# Patient Record
Sex: FEMALE | Race: BLACK OR AFRICAN AMERICAN | Employment: UNEMPLOYED | URBAN - METROPOLITAN AREA
[De-identification: names, ages, dates, MRNs, and addresses within clinical notes are randomized per-mention and may not be internally consistent; named-entity substitution may affect disease eponyms.]

---

## 2017-08-23 ENCOUNTER — HOSPITAL ENCOUNTER (EMERGENCY)
Age: 37
Discharge: HOME | End: 2017-08-24
Attending: EMERGENCY MEDICINE
Payer: MEDICARE

## 2017-08-23 DIAGNOSIS — S01.81XA FACIAL LACERATION, INITIAL ENCOUNTER: Primary | ICD-10-CM

## 2017-08-23 PROCEDURE — 99284 EMERGENCY DEPT VISIT MOD MDM: CPT

## 2017-08-23 PROCEDURE — 12013 RPR F/E/E/N/L/M 2.6-5.0 CM: CPT

## 2017-08-24 ENCOUNTER — APPOINTMENT (OUTPATIENT)
Dept: CT IMAGING | Age: 37
End: 2017-08-24
Payer: MEDICARE

## 2017-08-24 VITALS
WEIGHT: 145 LBS | HEART RATE: 129 BPM | HEIGHT: 61 IN | SYSTOLIC BLOOD PRESSURE: 127 MMHG | TEMPERATURE: 97.7 F | BODY MASS INDEX: 27.38 KG/M2 | OXYGEN SATURATION: 96 % | DIASTOLIC BLOOD PRESSURE: 89 MMHG | RESPIRATION RATE: 21 BRPM

## 2017-08-24 PROCEDURE — 2580000003 HC RX 258: Performed by: EMERGENCY MEDICINE

## 2017-08-24 PROCEDURE — 6360000002 HC RX W HCPCS: Performed by: EMERGENCY MEDICINE

## 2017-08-24 PROCEDURE — 70450 CT HEAD/BRAIN W/O DYE: CPT

## 2017-08-24 PROCEDURE — 96374 THER/PROPH/DIAG INJ IV PUSH: CPT

## 2017-08-24 PROCEDURE — 6370000000 HC RX 637 (ALT 250 FOR IP): Performed by: EMERGENCY MEDICINE

## 2017-08-24 RX ORDER — IBUPROFEN 800 MG/1
800 TABLET ORAL EVERY 8 HOURS PRN
Qty: 30 TABLET | Refills: 0 | Status: SHIPPED | OUTPATIENT
Start: 2017-08-24 | End: 2020-11-24

## 2017-08-24 RX ORDER — ONDANSETRON 2 MG/ML
4 INJECTION INTRAMUSCULAR; INTRAVENOUS ONCE
Status: COMPLETED | OUTPATIENT
Start: 2017-08-24 | End: 2017-08-24

## 2017-08-24 RX ORDER — LIDOCAINE HYDROCHLORIDE AND EPINEPHRINE 10; 10 MG/ML; UG/ML
20 INJECTION, SOLUTION INFILTRATION; PERINEURAL ONCE
Status: DISCONTINUED | OUTPATIENT
Start: 2017-08-24 | End: 2017-08-24 | Stop reason: HOSPADM

## 2017-08-24 RX ORDER — 0.9 % SODIUM CHLORIDE 0.9 %
1000 INTRAVENOUS SOLUTION INTRAVENOUS ONCE
Status: COMPLETED | OUTPATIENT
Start: 2017-08-24 | End: 2017-08-24

## 2017-08-24 RX ADMIN — Medication: at 01:21

## 2017-08-24 RX ADMIN — ONDANSETRON 4 MG: 2 INJECTION, SOLUTION INTRAMUSCULAR; INTRAVENOUS at 00:12

## 2017-08-24 RX ADMIN — SODIUM CHLORIDE 1000 ML: 9 INJECTION, SOLUTION INTRAVENOUS at 00:11

## 2017-08-24 ASSESSMENT — PAIN DESCRIPTION - FREQUENCY: FREQUENCY: CONTINUOUS

## 2017-08-24 ASSESSMENT — ENCOUNTER SYMPTOMS
COLOR CHANGE: 0
NAUSEA: 1
DIARRHEA: 0
CONSTIPATION: 0
BACK PAIN: 0
PHOTOPHOBIA: 0
ABDOMINAL PAIN: 0
SHORTNESS OF BREATH: 0
COUGH: 0
VOMITING: 1
SINUS PRESSURE: 0
RHINORRHEA: 0

## 2017-08-24 ASSESSMENT — PAIN SCALES - GENERAL: PAINLEVEL_OUTOF10: 10

## 2017-08-24 ASSESSMENT — PAIN DESCRIPTION - ONSET: ONSET: SUDDEN

## 2017-08-24 ASSESSMENT — PAIN DESCRIPTION - DESCRIPTORS: DESCRIPTORS: ACHING;SORE

## 2017-08-24 ASSESSMENT — PAIN DESCRIPTION - ORIENTATION: ORIENTATION: LEFT

## 2017-08-24 ASSESSMENT — PAIN DESCRIPTION - LOCATION: LOCATION: FACE

## 2017-08-24 ASSESSMENT — PAIN DESCRIPTION - PAIN TYPE: TYPE: ACUTE PAIN

## 2017-09-07 ENCOUNTER — HOSPITAL ENCOUNTER (EMERGENCY)
Age: 37
Discharge: HOME | End: 2017-09-07
Attending: EMERGENCY MEDICINE
Payer: MEDICARE

## 2017-09-07 VITALS
HEART RATE: 85 BPM | DIASTOLIC BLOOD PRESSURE: 100 MMHG | TEMPERATURE: 98.4 F | RESPIRATION RATE: 18 BRPM | OXYGEN SATURATION: 100 % | SYSTOLIC BLOOD PRESSURE: 148 MMHG

## 2017-09-07 DIAGNOSIS — Z48.02 ENCOUNTER FOR REMOVAL OF SUTURES: Primary | ICD-10-CM

## 2017-09-07 PROCEDURE — 99281 EMR DPT VST MAYX REQ PHY/QHP: CPT

## 2017-09-07 ASSESSMENT — ENCOUNTER SYMPTOMS
VOMITING: 0
NAUSEA: 0
SHORTNESS OF BREATH: 0

## 2018-01-01 ENCOUNTER — HOSPITAL ENCOUNTER (EMERGENCY)
Age: 38
Discharge: LEFT BEFORE DISCHARGE | End: 2018-01-01
Attending: EMERGENCY MEDICINE
Payer: MEDICARE

## 2018-01-01 VITALS
HEART RATE: 95 BPM | DIASTOLIC BLOOD PRESSURE: 104 MMHG | RESPIRATION RATE: 12 BRPM | SYSTOLIC BLOOD PRESSURE: 161 MMHG | BODY MASS INDEX: 30.23 KG/M2 | WEIGHT: 160 LBS | TEMPERATURE: 97.5 F

## 2018-01-01 DIAGNOSIS — Z53.20 LEFT BEFORE TREATMENT COMPLETED: Primary | ICD-10-CM

## 2018-01-01 PROCEDURE — 99283 EMERGENCY DEPT VISIT LOW MDM: CPT

## 2018-01-01 ASSESSMENT — PAIN SCALES - GENERAL: PAINLEVEL_OUTOF10: 9

## 2018-01-01 ASSESSMENT — PAIN DESCRIPTION - DESCRIPTORS: DESCRIPTORS: TINGLING

## 2018-01-01 ASSESSMENT — PAIN DESCRIPTION - ORIENTATION: ORIENTATION: LEFT;RIGHT

## 2018-01-01 ASSESSMENT — PAIN DESCRIPTION - LOCATION: LOCATION: LEG

## 2018-01-01 NOTE — ED PROVIDER NOTES
11:47 AM  Patient is not in the room, there is a used gown sitting on the bed. Both bathroom doors are open and not in use. Riya Ambrose PA-C  01/01/18 1148      11:59 AM  Patient is still not in the room.   We'll presume she has eloped     Jacobo Flores PA-C  01/01/18 1152

## 2018-01-01 NOTE — ED PROVIDER NOTES
[address] [facility]     Emergency Department     Faculty Attestation    I performed a history and physical examination of the patient and discussed management with the resident. I have reviewed and agree with the residents findings including all diagnostic interpretations, and treatment plans as written. Any areas of disagreement are noted on the chart. I was personally present for the key portions of any procedures. I have documented in the chart those procedures where I was not present during the key portions. I have reviewed the emergency nurses triage note. I agree with the chief complaint, past medical history, past surgical history, allergies, medications, social and family history as documented unless otherwise noted below. Documentation of the HPI, Physical Exam and Medical Decision Making performed by scribrolan is based on my personal performance of the HPI, PE and MDM. For Physician Assistant/ Nurse Practitioner cases/documentation I have personally evaluated this patient and have completed at least one if not all key elements of the E/M (history, physical exam, and MDM). Additional findings are as noted. Patient left without being seen      Pre-hypertension/Hypertension: The patient has been informed that they may have pre-hypertension or Hypertension based on a blood pressure reading in the emergency department. I recommend that the patient call the primary care provider listed on their discharge instructions or a physician of their choice this week to arrange follow up for further evaluation of possible pre-hypertension or Hypertension.         [address] [place], [place]  01/01/18 2634

## 2018-04-08 ENCOUNTER — HOSPITAL ENCOUNTER (EMERGENCY)
Age: 38
Discharge: HOME | End: 2018-04-09
Attending: EMERGENCY MEDICINE
Payer: MEDICARE

## 2018-04-08 VITALS
TEMPERATURE: 97.6 F | OXYGEN SATURATION: 100 % | DIASTOLIC BLOOD PRESSURE: 103 MMHG | RESPIRATION RATE: 20 BRPM | WEIGHT: 145 LBS | SYSTOLIC BLOOD PRESSURE: 135 MMHG | HEIGHT: 62 IN | BODY MASS INDEX: 26.68 KG/M2 | HEART RATE: 105 BPM

## 2018-04-08 DIAGNOSIS — M79.604 PAIN IN BOTH LOWER EXTREMITIES: Primary | ICD-10-CM

## 2018-04-08 DIAGNOSIS — M79.605 PAIN IN BOTH LOWER EXTREMITIES: Primary | ICD-10-CM

## 2018-04-08 LAB — D-DIMER QUANTITATIVE: 0.47 MG/L FEU

## 2018-04-08 PROCEDURE — 83874 ASSAY OF MYOGLOBIN: CPT

## 2018-04-08 PROCEDURE — 85025 COMPLETE CBC W/AUTO DIFF WBC: CPT

## 2018-04-08 PROCEDURE — G0480 DRUG TEST DEF 1-7 CLASSES: HCPCS

## 2018-04-08 PROCEDURE — 36415 COLL VENOUS BLD VENIPUNCTURE: CPT

## 2018-04-08 PROCEDURE — 99283 EMERGENCY DEPT VISIT LOW MDM: CPT

## 2018-04-08 PROCEDURE — 96374 THER/PROPH/DIAG INJ IV PUSH: CPT

## 2018-04-08 PROCEDURE — 2580000003 HC RX 258: Performed by: EMERGENCY MEDICINE

## 2018-04-08 PROCEDURE — 6360000002 HC RX W HCPCS: Performed by: EMERGENCY MEDICINE

## 2018-04-08 PROCEDURE — 83735 ASSAY OF MAGNESIUM: CPT

## 2018-04-08 PROCEDURE — 85379 FIBRIN DEGRADATION QUANT: CPT

## 2018-04-08 PROCEDURE — 80048 BASIC METABOLIC PNL TOTAL CA: CPT

## 2018-04-08 PROCEDURE — 82550 ASSAY OF CK (CPK): CPT

## 2018-04-08 RX ORDER — 0.9 % SODIUM CHLORIDE 0.9 %
1000 INTRAVENOUS SOLUTION INTRAVENOUS ONCE
Status: COMPLETED | OUTPATIENT
Start: 2018-04-08 | End: 2018-04-09

## 2018-04-08 RX ORDER — KETOROLAC TROMETHAMINE 30 MG/ML
30 INJECTION, SOLUTION INTRAMUSCULAR; INTRAVENOUS ONCE
Status: COMPLETED | OUTPATIENT
Start: 2018-04-08 | End: 2018-04-08

## 2018-04-08 RX ADMIN — SODIUM CHLORIDE 1000 ML: 9 INJECTION, SOLUTION INTRAVENOUS at 23:38

## 2018-04-08 RX ADMIN — KETOROLAC TROMETHAMINE 30 MG: 30 INJECTION, SOLUTION INTRAMUSCULAR at 23:38

## 2018-04-08 ASSESSMENT — ENCOUNTER SYMPTOMS
TROUBLE SWALLOWING: 0
BLOOD IN STOOL: 0
EYE DISCHARGE: 0
RHINORRHEA: 0
SHORTNESS OF BREATH: 0
SINUS PRESSURE: 0
ABDOMINAL PAIN: 0
CONSTIPATION: 0
DIARRHEA: 0
BACK PAIN: 0
NAUSEA: 0
WHEEZING: 0
VOMITING: 0
EYE REDNESS: 0
EYE PAIN: 0
COLOR CHANGE: 0
CHEST TIGHTNESS: 0
SORE THROAT: 0
COUGH: 0
FACIAL SWELLING: 0

## 2018-04-08 ASSESSMENT — PAIN SCALES - GENERAL
PAINLEVEL_OUTOF10: 10
PAINLEVEL_OUTOF10: 10

## 2018-04-09 LAB
ABSOLUTE EOS #: 0.26 K/UL (ref 0–0.4)
ABSOLUTE IMMATURE GRANULOCYTE: ABNORMAL K/UL (ref 0–0.3)
ABSOLUTE LYMPH #: 6.2 K/UL (ref 1–4.8)
ABSOLUTE MONO #: 0.4 K/UL (ref 0.1–1.3)
ANION GAP SERPL CALCULATED.3IONS-SCNC: 14 MMOL/L (ref 9–17)
BASOPHILS # BLD: 0 % (ref 0–2)
BASOPHILS ABSOLUTE: 0 K/UL (ref 0–0.2)
BUN BLDV-MCNC: 8 MG/DL (ref 6–20)
BUN/CREAT BLD: ABNORMAL (ref 9–20)
CALCIUM SERPL-MCNC: 8.6 MG/DL (ref 8.6–10.4)
CHLORIDE BLD-SCNC: 105 MMOL/L (ref 98–107)
CO2: 22 MMOL/L (ref 20–31)
CREAT SERPL-MCNC: 0.49 MG/DL (ref 0.5–0.9)
DIFFERENTIAL TYPE: ABNORMAL
EOSINOPHILS RELATIVE PERCENT: 2 % (ref 0–4)
ETHANOL PERCENT: 0.29 %
ETHANOL: 286 MG/DL
GFR AFRICAN AMERICAN: >60 ML/MIN
GFR NON-AFRICAN AMERICAN: >60 ML/MIN
GFR SERPL CREATININE-BSD FRML MDRD: ABNORMAL ML/MIN/{1.73_M2}
GFR SERPL CREATININE-BSD FRML MDRD: ABNORMAL ML/MIN/{1.73_M2}
GLUCOSE BLD-MCNC: 79 MG/DL (ref 70–99)
HCT VFR BLD CALC: 39.9 % (ref 36–46)
HEMOGLOBIN: 13.8 G/DL (ref 12–16)
IMMATURE GRANULOCYTES: ABNORMAL %
LYMPHOCYTES # BLD: 47 % (ref 24–44)
MAGNESIUM: 2.1 MG/DL (ref 1.6–2.6)
MCH RBC QN AUTO: 33.7 PG (ref 26–34)
MCHC RBC AUTO-ENTMCNC: 34.5 G/DL (ref 31–37)
MCV RBC AUTO: 97.8 FL (ref 80–100)
MONOCYTES # BLD: 3 % (ref 1–7)
MORPHOLOGY: ABNORMAL
MYOGLOBIN: <21 NG/ML (ref 25–58)
NRBC AUTOMATED: ABNORMAL PER 100 WBC
PDW BLD-RTO: 15.2 % (ref 11.5–14.9)
PLATELET # BLD: 304 K/UL (ref 150–450)
PLATELET ESTIMATE: ABNORMAL
PMV BLD AUTO: 8.4 FL (ref 6–12)
POTASSIUM SERPL-SCNC: 3.4 MMOL/L (ref 3.7–5.3)
RBC # BLD: 4.08 M/UL (ref 4–5.2)
RBC # BLD: ABNORMAL 10*6/UL
SEG NEUTROPHILS: 48 % (ref 36–66)
SEGMENTED NEUTROPHILS ABSOLUTE COUNT: 6.34 K/UL (ref 1.3–9.1)
SODIUM BLD-SCNC: 141 MMOL/L (ref 135–144)
TOTAL CK: 88 U/L (ref 26–192)
WBC # BLD: 13.2 K/UL (ref 3.5–11)
WBC # BLD: ABNORMAL 10*3/UL

## 2018-04-09 PROCEDURE — 6370000000 HC RX 637 (ALT 250 FOR IP): Performed by: EMERGENCY MEDICINE

## 2018-04-09 RX ORDER — CYCLOBENZAPRINE HCL 10 MG
10 TABLET ORAL ONCE
Status: COMPLETED | OUTPATIENT
Start: 2018-04-09 | End: 2018-04-09

## 2018-04-09 RX ORDER — TRAMADOL HYDROCHLORIDE 50 MG/1
50 TABLET ORAL ONCE
Status: COMPLETED | OUTPATIENT
Start: 2018-04-09 | End: 2018-04-09

## 2018-04-09 RX ORDER — TRAMADOL HYDROCHLORIDE 50 MG/1
50 TABLET ORAL EVERY 6 HOURS PRN
Qty: 10 TABLET | Refills: 0 | Status: SHIPPED | OUTPATIENT
Start: 2018-04-09 | End: 2018-04-19

## 2018-04-09 RX ORDER — CYCLOBENZAPRINE HCL 10 MG
10 TABLET ORAL 3 TIMES DAILY PRN
Qty: 20 TABLET | Refills: 0 | Status: SHIPPED | OUTPATIENT
Start: 2018-04-09 | End: 2018-04-19

## 2018-04-09 RX ADMIN — CYCLOBENZAPRINE HYDROCHLORIDE 10 MG: 10 TABLET, FILM COATED ORAL at 00:12

## 2018-04-09 RX ADMIN — TRAMADOL HYDROCHLORIDE 50 MG: 50 TABLET, FILM COATED ORAL at 00:12

## 2018-04-09 ASSESSMENT — PAIN SCALES - GENERAL: PAINLEVEL_OUTOF10: 10

## 2018-11-24 ENCOUNTER — HOSPITAL ENCOUNTER (EMERGENCY)
Age: 38
Discharge: HOME | End: 2018-11-25
Attending: EMERGENCY MEDICINE
Payer: MEDICARE

## 2018-11-24 VITALS
HEIGHT: 62 IN | SYSTOLIC BLOOD PRESSURE: 208 MMHG | DIASTOLIC BLOOD PRESSURE: 114 MMHG | TEMPERATURE: 97.9 F | OXYGEN SATURATION: 100 % | RESPIRATION RATE: 18 BRPM | WEIGHT: 145 LBS | HEART RATE: 98 BPM | BODY MASS INDEX: 26.68 KG/M2

## 2018-11-24 DIAGNOSIS — M79.604 BILATERAL LEG PAIN: Primary | ICD-10-CM

## 2018-11-24 DIAGNOSIS — M79.605 BILATERAL LEG PAIN: Primary | ICD-10-CM

## 2018-11-24 PROCEDURE — 6360000002 HC RX W HCPCS: Performed by: EMERGENCY MEDICINE

## 2018-11-24 PROCEDURE — 96372 THER/PROPH/DIAG INJ SC/IM: CPT

## 2018-11-24 PROCEDURE — 99283 EMERGENCY DEPT VISIT LOW MDM: CPT

## 2018-11-24 PROCEDURE — 6370000000 HC RX 637 (ALT 250 FOR IP): Performed by: EMERGENCY MEDICINE

## 2018-11-24 RX ORDER — KETOROLAC TROMETHAMINE 30 MG/ML
30 INJECTION, SOLUTION INTRAMUSCULAR; INTRAVENOUS ONCE
Status: COMPLETED | OUTPATIENT
Start: 2018-11-24 | End: 2018-11-24

## 2018-11-24 RX ORDER — HYDROCODONE BITARTRATE AND ACETAMINOPHEN 5; 325 MG/1; MG/1
1 TABLET ORAL EVERY 12 HOURS PRN
Qty: 5 TABLET | Refills: 0 | Status: SHIPPED | OUTPATIENT
Start: 2018-11-24 | End: 2018-11-26

## 2018-11-24 RX ORDER — DIAZEPAM 2 MG/1
2 TABLET ORAL ONCE
Status: COMPLETED | OUTPATIENT
Start: 2018-11-24 | End: 2018-11-24

## 2018-11-24 RX ADMIN — KETOROLAC TROMETHAMINE 30 MG: 30 INJECTION, SOLUTION INTRAMUSCULAR at 22:42

## 2018-11-24 RX ADMIN — DIAZEPAM 2 MG: 2 TABLET ORAL at 22:42

## 2018-11-24 ASSESSMENT — ENCOUNTER SYMPTOMS
DIARRHEA: 0
ABDOMINAL PAIN: 0
SORE THROAT: 0
SHORTNESS OF BREATH: 0
EYE PAIN: 0
VOMITING: 0
COUGH: 0
EYE DISCHARGE: 0
NAUSEA: 0

## 2018-11-24 ASSESSMENT — PAIN SCALES - GENERAL
PAINLEVEL_OUTOF10: 8
PAINLEVEL_OUTOF10: 8

## 2018-11-24 ASSESSMENT — PAIN DESCRIPTION - ORIENTATION: ORIENTATION: LEFT;RIGHT

## 2018-11-24 ASSESSMENT — PAIN DESCRIPTION - LOCATION: LOCATION: LEG

## 2018-11-24 ASSESSMENT — PAIN DESCRIPTION - DESCRIPTORS: DESCRIPTORS: SHARP;SHOOTING;DULL;ACHING

## 2018-11-25 NOTE — ED PROVIDER NOTES
[facility]  ED  Emergency Department Encounter  EmergencyMedicine Resident     Pt Name:Natalia Baker  MRN: [id number]  [place] 1980  Date of evaluation: 18  PCP:  No primary care provider on file. CHIEF COMPLAINT       Chief Complaint   Patient presents with    Leg Pain     bilateral leg pain for a few days, pt thinks she has nerve damage from previous DVTs, states cramping in legs. Pt is ambulatory with steady gait       HISTORY OF PRESENT ILLNESS  (Location/Symptom, Timing/Onset, Context/Setting, Quality, Duration, Modifying Factors, Severity.)      Pedro Guillermo is a 41 y.o. female who presents with bilateral leg pain ×2 years, worse in last few days. Patient states she has had this pain since her previous blood clots, which were treated with anticoagulation for approximately 8 months, blood clots attributed to pregnancy. Patient notes the pain starts in bilateral posterior thighs and radiates down her legs. Patient notes she was seen a few months ago for the same complaint, given pain medication and states that workup was negative at that time. Patient denies any numbness, tingling or weakness to her legs any unilateral leg swelling or any recent surgery or immobilization any history of cancer. PAST MEDICAL / SURGICAL / SOCIAL / FAMILY HISTORY      has a past medical history of H/O blood clots. has a past surgical history that includes  section (, ) and Tubal ligation (). Social History     Social History    Marital status: Single     Spouse name: N/A    Number of children: N/A    Years of education: N/A     Occupational History    Not on file.      Social History Main Topics    Smoking status: Current Every Day Smoker     Packs/day: 0.20     Types: Cigarettes    Smokeless tobacco: Never Used    Alcohol use 0.0 oz/week      Comment: socially     Drug use: No    Sexual activity: Yes     Partners: Male     Other Topics Concern    Not on file     Social History Narrative    No narrative on file       Family History   Problem Relation Age of Onset    High Blood Pressure Mother     High Blood Pressure Maternal Grandmother        Allergies:  Patient has no known allergies. Home Medications:  Prior to Admission medications    Medication Sig Start Date End Date Taking? Authorizing Provider   ibuprofen (ADVIL;MOTRIN) 800 MG tablet Take 1 tablet by mouth every 8 hours as needed for Pain 8/24/17   Tessy De MD   HYDROcodone-acetaminophen [facility]) 5-325 MG per tablet Take 1-2 tablets by mouth every 6 hours as needed for Pain 9/11/15   Isamar Peguero MD       REVIEW OF SYSTEMS    (2-9 systems for level 4, 10 or more for level 5)      Review of Systems   Constitutional: Negative for chills, diaphoresis and fever. HENT: Negative for congestion and sore throat. Eyes: Negative for pain and discharge. Respiratory: Negative for cough and shortness of breath. Cardiovascular: Negative for chest pain and leg swelling. Gastrointestinal: Negative for abdominal pain, diarrhea, nausea and vomiting. Endocrine: Negative for polydipsia and polyuria. Genitourinary: Negative for dysuria and hematuria. Musculoskeletal: Negative for neck pain and neck stiffness. Skin: Negative for pallor and rash. Allergic/Immunologic: Negative for environmental allergies and food allergies. Neurological: Negative for numbness and headaches. Hematological: Negative for adenopathy. Does not bruise/bleed easily. Psychiatric/Behavioral: Negative for hallucinations and suicidal ideas. PHYSICAL EXAM   (up to 7 for level 4, 8 or more for level 5)      INITIAL VITALS:   BP (!) 208/114   Pulse 98   Temp 97.9 °F (36.6 °C) (Oral)   Resp 18   Ht 5' 2\" (1.575 m)   Wt 145 lb (65.8 kg)   LMP 11/22/2018   SpO2 100%   BMI 26.52 kg/m²     Physical Exam   Constitutional: She is oriented to person, place, and time.  She appears well-developed and starts in bilateral thighs and radiates throughout both legs, history of DVT when she was pregnant and notes the pain is been present off-and-on thereafter. There is no concerning signs for DVT, no unilateral swelling, pain throughout both legs not consistent with DVT, compartments are soft, doubt compartment syndrome, no recent trauma either. Most likely muscle tightness radiated from lower back or muscle spasm, will provide Toradol and low-dose Valium for muscle relaxation and reassess. Reviewed recent visit to [facility], lab workup including CK, myoglobin, d-dimer and a left lites were all negative, she states that this pain is the same as during that visit. RADIOLOGY:  None    EKG  None    All EKG's are interpreted by the Emergency Department Physician who either signs or Co-signs this chart in the absence of a cardiologist.    EMERGENCY DEPARTMENT COURSE:  On reassessment, patient states pain has improved, reassessment of blood pressure is 645 systolic  Patient ambulating without significant difficulty, states the pain starts in her buttocks and goes down the back of both legs, discussed need for her to follow up with primary care provider as this could be sciatic nerve pain or potentially radiculopathy, patient understands and has an appointment within the next couple weeks with her primary care provider. I discussed stretching techniques with patient. I discussed discharge plan, follow-up plan and return precautions with patient including any numbness or weakness to the legs or bowel or bladder problems, patient understands and agrees with discharge plan    PROCEDURES:  None    CONSULTS:  None    CRITICAL CARE:  None    FINAL IMPRESSION      No diagnosis found. Bilateral leg pain    DISPOSITION / PLAN     DISPOSITION        PATIENT REFERRED TO:  No follow-up provider specified.     DISCHARGE MEDICATIONS:  New Prescriptions    No medications on file       Sunita Fowler DO  Emergency Medicine

## 2018-12-18 ENCOUNTER — HOSPITAL ENCOUNTER (EMERGENCY)
Age: 38
Discharge: HOME | End: 2018-12-18
Attending: EMERGENCY MEDICINE
Payer: MEDICARE

## 2018-12-18 VITALS
WEIGHT: 145 LBS | OXYGEN SATURATION: 100 % | HEART RATE: 104 BPM | SYSTOLIC BLOOD PRESSURE: 144 MMHG | BODY MASS INDEX: 26.52 KG/M2 | RESPIRATION RATE: 16 BRPM | TEMPERATURE: 97.8 F | DIASTOLIC BLOOD PRESSURE: 98 MMHG

## 2018-12-18 DIAGNOSIS — L02.91 ABSCESS: Primary | ICD-10-CM

## 2018-12-18 PROCEDURE — 99282 EMERGENCY DEPT VISIT SF MDM: CPT

## 2018-12-18 RX ORDER — SULFAMETHOXAZOLE AND TRIMETHOPRIM 800; 160 MG/1; MG/1
1 TABLET ORAL 2 TIMES DAILY
Qty: 14 TABLET | Refills: 0 | Status: SHIPPED | OUTPATIENT
Start: 2018-12-18 | End: 2018-12-25

## 2018-12-18 RX ORDER — CEPHALEXIN 500 MG/1
500 CAPSULE ORAL 2 TIMES DAILY
Qty: 14 CAPSULE | Refills: 0 | Status: SHIPPED | OUTPATIENT
Start: 2018-12-18 | End: 2018-12-25

## 2018-12-18 RX ORDER — ACETAMINOPHEN 500 MG
500 TABLET ORAL 3 TIMES DAILY PRN
Qty: 21 TABLET | Refills: 0 | Status: ON HOLD | OUTPATIENT
Start: 2018-12-18 | End: 2021-04-17

## 2018-12-18 ASSESSMENT — PAIN DESCRIPTION - PROGRESSION: CLINICAL_PROGRESSION: GRADUALLY WORSENING

## 2018-12-18 ASSESSMENT — PAIN SCALES - GENERAL: PAINLEVEL_OUTOF10: 8

## 2018-12-18 ASSESSMENT — PAIN DESCRIPTION - PAIN TYPE: TYPE: ACUTE PAIN

## 2018-12-18 ASSESSMENT — PAIN DESCRIPTION - ORIENTATION: ORIENTATION: RIGHT

## 2018-12-18 ASSESSMENT — PAIN DESCRIPTION - ONSET: ONSET: GRADUAL

## 2018-12-18 ASSESSMENT — PAIN DESCRIPTION - DESCRIPTORS: DESCRIPTORS: SHARP;ACHING

## 2018-12-18 ASSESSMENT — PAIN DESCRIPTION - FREQUENCY: FREQUENCY: CONTINUOUS

## 2018-12-19 ASSESSMENT — ENCOUNTER SYMPTOMS
ABDOMINAL PAIN: 0
BACK PAIN: 0
CHOKING: 0
APNEA: 0
EYE DISCHARGE: 0
CHEST TIGHTNESS: 0
ABDOMINAL DISTENTION: 0

## 2018-12-19 NOTE — ED NOTES
Pt states that she needs a note to excuse her from the shelter where she is staying, Dr. Jesica Flores notified     Marlen Lira RN  12/18/18 1946

## 2018-12-19 NOTE — ED PROVIDER NOTES
[facility] ED  eMERGENCY dEPARTMENT eNCOUnter      Pt Name: Parth Alas  MRN: [id number]  [place] 1980  Date of evaluation: 12/18/2018  Provider: Rachel Akers MD    [address] [place] [address]       Chief Complaint   Patient presents with    Abscess     Right axilla abscess, present x 1 week, discharge and foul drainage today. Pt reports pain. HISTORY OF PRESENT ILLNESS   (Location/Symptom, Timing/Onset, Context/Setting, Quality, Duration, Modifying Factors, Severity)  Note limiting factors. Parth Alas is a 41 y.o. female who presents to the emergency department For complaints of abscess and right axillary area. Patient reports 1 week ago she noticed a small bump there when shaving. She denied any trauma to it at that time. This a.m. patient reported she had noticed the indurated area increase in size and pain. Throughout the day she felt the pain increasing and began to notice white foul-smelling discharge coming from the area which is what prompted the ED visit. Patient reports changing soap but denies noticing any inciting event. Patient denies fevers, chills, or other systemic symptoms. Patient reports previous history many years ago of abscess which required surgical drainage. HPI    Nursing Notes were reviewed. REVIEW OF SYSTEMS    (2-9 systems for level 4, 10 or more for level 5)     Review of Systems   Constitutional: Negative for activity change and appetite change. HENT: Negative for congestion and dental problem. Eyes: Negative for discharge. Respiratory: Negative for apnea, choking and chest tightness. Cardiovascular: Negative for chest pain, palpitations and leg swelling. Gastrointestinal: Negative for abdominal distention and abdominal pain. Genitourinary: Negative for difficulty urinating, dyspareunia and dysuria. Musculoskeletal: Negative for arthralgias and back pain. Neurological: Negative for dizziness and headaches.    Hematological:

## 2019-06-22 ENCOUNTER — HOSPITAL ENCOUNTER (INPATIENT)
Age: 39
LOS: 1 days | Discharge: HOME | DRG: 197 | End: 2019-06-23
Attending: EMERGENCY MEDICINE | Admitting: INTERNAL MEDICINE
Payer: MEDICARE

## 2019-06-22 DIAGNOSIS — I82.401 DEEP VEIN THROMBOSIS (DVT) OF RIGHT LOWER EXTREMITY, UNSPECIFIED CHRONICITY, UNSPECIFIED VEIN (HCC): Primary | ICD-10-CM

## 2019-06-22 PROBLEM — M79.89 RIGHT LEG SWELLING: Status: ACTIVE | Noted: 2019-06-22

## 2019-06-22 PROBLEM — I82.413 ACUTE DEEP VEIN THROMBOSIS (DVT) OF FEMORAL VEIN OF BOTH LOWER EXTREMITIES (HCC): Status: ACTIVE | Noted: 2019-06-22

## 2019-06-22 LAB
ABSOLUTE EOS #: 0.16 K/UL (ref 0–0.44)
ABSOLUTE IMMATURE GRANULOCYTE: 0.05 K/UL (ref 0–0.3)
ABSOLUTE LYMPH #: 4.28 K/UL (ref 1.1–3.7)
ABSOLUTE MONO #: 0.36 K/UL (ref 0.1–1.2)
ANION GAP SERPL CALCULATED.3IONS-SCNC: 15 MMOL/L (ref 9–17)
BASOPHILS # BLD: 0 % (ref 0–2)
BASOPHILS ABSOLUTE: 0.05 K/UL (ref 0–0.2)
BUN BLDV-MCNC: 14 MG/DL (ref 6–20)
BUN/CREAT BLD: NORMAL (ref 9–20)
CALCIUM SERPL-MCNC: 9.3 MG/DL (ref 8.6–10.4)
CHLORIDE BLD-SCNC: 103 MMOL/L (ref 98–107)
CO2: 21 MMOL/L (ref 20–31)
CREAT SERPL-MCNC: 0.58 MG/DL (ref 0.5–0.9)
DIFFERENTIAL TYPE: ABNORMAL
EOSINOPHILS RELATIVE PERCENT: 1 % (ref 1–4)
GFR AFRICAN AMERICAN: >60 ML/MIN
GFR NON-AFRICAN AMERICAN: >60 ML/MIN
GFR SERPL CREATININE-BSD FRML MDRD: NORMAL ML/MIN/{1.73_M2}
GFR SERPL CREATININE-BSD FRML MDRD: NORMAL ML/MIN/{1.73_M2}
GLUCOSE BLD-MCNC: 92 MG/DL (ref 70–99)
HCG QUALITATIVE: NEGATIVE
HCT VFR BLD CALC: 38.9 % (ref 36.3–47.1)
HEMOGLOBIN: 13.3 G/DL (ref 11.9–15.1)
IMMATURE GRANULOCYTES: 0 %
INR BLD: 0.9
LYMPHOCYTES # BLD: 35 % (ref 24–43)
MCH RBC QN AUTO: 32.3 PG (ref 25.2–33.5)
MCHC RBC AUTO-ENTMCNC: 34.2 G/DL (ref 28.4–34.8)
MCV RBC AUTO: 94.4 FL (ref 82.6–102.9)
MONOCYTES # BLD: 3 % (ref 3–12)
NRBC AUTOMATED: 0 PER 100 WBC
PARTIAL THROMBOPLASTIN TIME: 25.7 SEC (ref 20.5–30.5)
PDW BLD-RTO: 16.3 % (ref 11.8–14.4)
PLATELET # BLD: 368 K/UL (ref 138–453)
PLATELET ESTIMATE: ABNORMAL
PMV BLD AUTO: 9.7 FL (ref 8.1–13.5)
POTASSIUM SERPL-SCNC: 3.8 MMOL/L (ref 3.7–5.3)
PROTHROMBIN TIME: 9.6 SEC (ref 9–12)
RBC # BLD: 4.12 M/UL (ref 3.95–5.11)
RBC # BLD: ABNORMAL 10*6/UL
SEG NEUTROPHILS: 61 % (ref 36–65)
SEGMENTED NEUTROPHILS ABSOLUTE COUNT: 7.35 K/UL (ref 1.5–8.1)
SODIUM BLD-SCNC: 139 MMOL/L (ref 135–144)
WBC # BLD: 12.3 K/UL (ref 3.5–11.3)
WBC # BLD: ABNORMAL 10*3/UL

## 2019-06-22 PROCEDURE — 6370000000 HC RX 637 (ALT 250 FOR IP): Performed by: NURSE PRACTITIONER

## 2019-06-22 PROCEDURE — 84703 CHORIONIC GONADOTROPIN ASSAY: CPT

## 2019-06-22 PROCEDURE — 96372 THER/PROPH/DIAG INJ SC/IM: CPT

## 2019-06-22 PROCEDURE — 99223 1ST HOSP IP/OBS HIGH 75: CPT | Performed by: INTERNAL MEDICINE

## 2019-06-22 PROCEDURE — 6360000002 HC RX W HCPCS: Performed by: INTERNAL MEDICINE

## 2019-06-22 PROCEDURE — 99284 EMERGENCY DEPT VISIT MOD MDM: CPT

## 2019-06-22 PROCEDURE — 1200000000 HC SEMI PRIVATE

## 2019-06-22 PROCEDURE — 6370000000 HC RX 637 (ALT 250 FOR IP): Performed by: INTERNAL MEDICINE

## 2019-06-22 PROCEDURE — 6360000002 HC RX W HCPCS: Performed by: EMERGENCY MEDICINE

## 2019-06-22 PROCEDURE — 85025 COMPLETE CBC W/AUTO DIFF WBC: CPT

## 2019-06-22 PROCEDURE — 2580000003 HC RX 258: Performed by: NURSE PRACTITIONER

## 2019-06-22 PROCEDURE — 6370000000 HC RX 637 (ALT 250 FOR IP): Performed by: EMERGENCY MEDICINE

## 2019-06-22 PROCEDURE — 85730 THROMBOPLASTIN TIME PARTIAL: CPT

## 2019-06-22 PROCEDURE — 85610 PROTHROMBIN TIME: CPT

## 2019-06-22 PROCEDURE — 80048 BASIC METABOLIC PNL TOTAL CA: CPT

## 2019-06-22 PROCEDURE — 93970 EXTREMITY STUDY: CPT

## 2019-06-22 RX ORDER — OXYCODONE HYDROCHLORIDE AND ACETAMINOPHEN 5; 325 MG/1; MG/1
2 TABLET ORAL EVERY 4 HOURS PRN
Status: DISCONTINUED | OUTPATIENT
Start: 2019-06-22 | End: 2019-06-23 | Stop reason: HOSPADM

## 2019-06-22 RX ORDER — LANOLIN ALCOHOL/MO/W.PET/CERES
50 CREAM (GRAM) TOPICAL DAILY
Status: ON HOLD | COMMUNITY
End: 2021-04-17

## 2019-06-22 RX ORDER — POTASSIUM CHLORIDE 20 MEQ/1
40 TABLET, EXTENDED RELEASE ORAL PRN
Status: DISCONTINUED | OUTPATIENT
Start: 2019-06-22 | End: 2019-06-23 | Stop reason: HOSPADM

## 2019-06-22 RX ORDER — LOSARTAN POTASSIUM 50 MG/1
50 TABLET ORAL DAILY
Status: ON HOLD | COMMUNITY
End: 2021-04-17

## 2019-06-22 RX ORDER — AMOXICILLIN 250 MG
1 CAPSULE ORAL DAILY
Status: ON HOLD | COMMUNITY
End: 2021-04-17

## 2019-06-22 RX ORDER — ACETAMINOPHEN 325 MG/1
650 TABLET ORAL EVERY 4 HOURS PRN
Status: DISCONTINUED | OUTPATIENT
Start: 2019-06-22 | End: 2019-06-23 | Stop reason: HOSPADM

## 2019-06-22 RX ORDER — ONDANSETRON 2 MG/ML
4 INJECTION INTRAMUSCULAR; INTRAVENOUS EVERY 6 HOURS PRN
Status: DISCONTINUED | OUTPATIENT
Start: 2019-06-22 | End: 2019-06-23 | Stop reason: HOSPADM

## 2019-06-22 RX ORDER — POTASSIUM CHLORIDE 7.45 MG/ML
10 INJECTION INTRAVENOUS PRN
Status: DISCONTINUED | OUTPATIENT
Start: 2019-06-22 | End: 2019-06-23 | Stop reason: HOSPADM

## 2019-06-22 RX ORDER — OXYCODONE HYDROCHLORIDE AND ACETAMINOPHEN 5; 325 MG/1; MG/1
1 TABLET ORAL EVERY 4 HOURS PRN
Status: DISCONTINUED | OUTPATIENT
Start: 2019-06-22 | End: 2019-06-23 | Stop reason: HOSPADM

## 2019-06-22 RX ORDER — LANOLIN ALCOHOL/MO/W.PET/CERES
50 CREAM (GRAM) TOPICAL DAILY
Status: DISCONTINUED | OUTPATIENT
Start: 2019-06-22 | End: 2019-06-23 | Stop reason: HOSPADM

## 2019-06-22 RX ORDER — LOSARTAN POTASSIUM 50 MG/1
50 TABLET ORAL DAILY
Status: DISCONTINUED | OUTPATIENT
Start: 2019-06-22 | End: 2019-06-23 | Stop reason: HOSPADM

## 2019-06-22 RX ORDER — MAGNESIUM SULFATE 1 G/100ML
1 INJECTION INTRAVENOUS PRN
Status: DISCONTINUED | OUTPATIENT
Start: 2019-06-22 | End: 2019-06-23 | Stop reason: HOSPADM

## 2019-06-22 RX ORDER — SODIUM CHLORIDE 0.9 % (FLUSH) 0.9 %
10 SYRINGE (ML) INJECTION PRN
Status: DISCONTINUED | OUTPATIENT
Start: 2019-06-22 | End: 2019-06-23 | Stop reason: HOSPADM

## 2019-06-22 RX ORDER — NICOTINE 21 MG/24HR
1 PATCH, TRANSDERMAL 24 HOURS TRANSDERMAL DAILY PRN
Status: DISCONTINUED | OUTPATIENT
Start: 2019-06-22 | End: 2019-06-23 | Stop reason: HOSPADM

## 2019-06-22 RX ORDER — SENNA AND DOCUSATE SODIUM 50; 8.6 MG/1; MG/1
1 TABLET, FILM COATED ORAL DAILY
Status: DISCONTINUED | OUTPATIENT
Start: 2019-06-22 | End: 2019-06-23 | Stop reason: HOSPADM

## 2019-06-22 RX ORDER — OXYCODONE HYDROCHLORIDE AND ACETAMINOPHEN 5; 325 MG/1; MG/1
1 TABLET ORAL ONCE
Status: COMPLETED | OUTPATIENT
Start: 2019-06-22 | End: 2019-06-22

## 2019-06-22 RX ORDER — SODIUM CHLORIDE 0.9 % (FLUSH) 0.9 %
10 SYRINGE (ML) INJECTION EVERY 12 HOURS SCHEDULED
Status: DISCONTINUED | OUTPATIENT
Start: 2019-06-22 | End: 2019-06-23 | Stop reason: HOSPADM

## 2019-06-22 RX ORDER — WARFARIN SODIUM 5 MG/1
5 TABLET ORAL DAILY
Status: DISCONTINUED | OUTPATIENT
Start: 2019-06-22 | End: 2019-06-23 | Stop reason: HOSPADM

## 2019-06-22 RX ORDER — IBUPROFEN 800 MG/1
800 TABLET ORAL EVERY 8 HOURS PRN
Status: DISCONTINUED | OUTPATIENT
Start: 2019-06-22 | End: 2019-06-23 | Stop reason: HOSPADM

## 2019-06-22 RX ADMIN — OXYCODONE HYDROCHLORIDE AND ACETAMINOPHEN 1 TABLET: 5; 325 TABLET ORAL at 03:15

## 2019-06-22 RX ADMIN — SODIUM CHLORIDE, PRESERVATIVE FREE 10 ML: 5 INJECTION INTRAVENOUS at 09:08

## 2019-06-22 RX ADMIN — Medication 50 MG: at 09:08

## 2019-06-22 RX ADMIN — APIXABAN 5 MG: 5 TABLET, FILM COATED ORAL at 09:08

## 2019-06-22 RX ADMIN — ENOXAPARIN SODIUM 70 MG: 80 INJECTION SUBCUTANEOUS at 03:58

## 2019-06-22 RX ADMIN — SODIUM CHLORIDE, PRESERVATIVE FREE 10 ML: 5 INJECTION INTRAVENOUS at 20:34

## 2019-06-22 RX ADMIN — WARFARIN SODIUM 5 MG: 5 TABLET ORAL at 17:49

## 2019-06-22 RX ADMIN — OXYCODONE HYDROCHLORIDE AND ACETAMINOPHEN 2 TABLET: 5; 325 TABLET ORAL at 07:08

## 2019-06-22 RX ADMIN — ENOXAPARIN SODIUM 70 MG: 80 INJECTION SUBCUTANEOUS at 16:03

## 2019-06-22 RX ADMIN — LOSARTAN POTASSIUM 50 MG: 50 TABLET, FILM COATED ORAL at 09:08

## 2019-06-22 ASSESSMENT — PAIN DESCRIPTION - DESCRIPTORS: DESCRIPTORS: SQUEEZING;TIGHTNESS;THROBBING

## 2019-06-22 ASSESSMENT — ENCOUNTER SYMPTOMS
SORE THROAT: 0
RHINORRHEA: 0
ABDOMINAL PAIN: 0
EYE PAIN: 0
NAUSEA: 0
VOMITING: 0
COUGH: 0
COLOR CHANGE: 0
SHORTNESS OF BREATH: 0

## 2019-06-22 ASSESSMENT — PAIN DESCRIPTION - LOCATION: LOCATION: LEG

## 2019-06-22 ASSESSMENT — PAIN DESCRIPTION - FREQUENCY: FREQUENCY: CONTINUOUS

## 2019-06-22 ASSESSMENT — PAIN SCALES - GENERAL
PAINLEVEL_OUTOF10: 9
PAINLEVEL_OUTOF10: 0
PAINLEVEL_OUTOF10: 9
PAINLEVEL_OUTOF10: 8

## 2019-06-22 ASSESSMENT — PAIN DESCRIPTION - ORIENTATION: ORIENTATION: RIGHT;LEFT

## 2019-06-22 ASSESSMENT — PAIN DESCRIPTION - ONSET: ONSET: ON-GOING

## 2019-06-22 ASSESSMENT — PAIN DESCRIPTION - PROGRESSION: CLINICAL_PROGRESSION: GRADUALLY WORSENING

## 2019-06-22 NOTE — CARE COORDINATION
Case Management Initial Discharge Plan  Jesse Toptee,             Met with:patient to discuss discharge plans. Information verified: address, contacts, phone number, , insurance Yes  PCP:   Date of last visit:     Insurance Provider: [employer]    Discharge Planning    Living Arrangements:  Family Members   Support Systems:  Family Members    Home has 1 stories  4 stairs to climb to get into front door,   Location of bedroom/bathroom in home main    Patient able to perform ADL's:Independent    Current Services (outpatient & in home) none  DME equipment: none  DME provider: non    Pharmacy: [facility] Medications:  No  Does patient want to participate in local refill/ meds to beds program?  Yes    Potential Assistance Needed:  N/A    Patient agreeable to home care: No  [place] of choice provided:  no    Prior SNF/Rehab Placement and Facility: no  Agreeable to SNF/Rehab: No  [place] of choice provided: no   Evaluation: no    Expected Discharge date:  19  Patient expects to be discharged to:  home  Follow Up Appointment: Best Day/ Time: (any morning)    Transportation provider: family  Transportation arrangements needed for discharge: No    Readmission Risk              Risk of Unplanned Readmission:        5             Does patient have a readmission risk score greater than 14?: No  If yes, follow-up appointment must be made within 7 days of discharge.      Discharge Plan: home with family          Electronically signed by Concetta Boss RN on 19 at 2:13 PM

## 2019-06-22 NOTE — PROGRESS NOTES
Physical Therapy  DATE: 2019    NAME: Chelsie Kerr  MRN: [id number]   : 1980    Patient not seen this date for Physical Therapy due to:  [] Blood transfusion in progress  [] Hemodialysis  []  Patient Declined  [] Spine Precautions   [] Strict Bedrest  [] Surgery/ Procedure  [x] Testing- dopplers ordered to rule out DVT. PT will check back as time allows. [] Other        [] PT being discontinued at this time. Patient independent. No further needs. [] PT being discontinued at this time as the patient has been transferred to palliative care. No further needs.     Chrissy Cristobal, PT

## 2019-06-22 NOTE — ED NOTES
Son with patient and has no one to take son. Pt states will continue to try.        Carrol Babinski, RN  06/22/19 3339

## 2019-06-22 NOTE — ED NOTES
Dr. Valerie Lee at bedside to reevaluate patient and update on plan of care for admission     Mary Mccord RN  06/22/19 3328

## 2019-06-22 NOTE — ED PROVIDER NOTES
[facility] ED  Emergency Department Encounter  EmergencyMedicine Resident     Pt Name:Natalia Villalta  MRN: [id number]  [place] 1980  Date of evaluation: 19  PCP:  No primary care provider on file. CHIEF COMPLAINT       Chief Complaint   Patient presents with    Leg Pain     Was admitted in May for DVT to bilateral lower extremities. Now RLE edemetous, painful to move and bear weight x3 days       HISTORY OF PRESENT ILLNESS  (Location/Symptom, Timing/Onset, Context/Setting, Quality, Duration, Modifying Factors, Severity.)      Ofelia Zacarias is a 41 y.o. female who presents with chief complaint of worsening of pain and swelling to the right lower extremity. States that she was recently discharged from [facility] after being diagnosed with blood clots in both legs as well as PEs. States that she was started on Eliquis and has been taking her medications as prescribed. Had been doing fine upon discharge, but states that over the past 3 days, she has had worsening of pain and swelling. States that upon discharge, she was supposed to go to a rehab facility, but stated that she wanted to go home instead. Denies any weakness, numbness or tingling, only complaining of pain. PAST MEDICAL / SURGICAL / SOCIAL / FAMILY HISTORY      has a past medical history of DVT (deep vein thrombosis) in pregnancy ([address] and H/O blood clots. has a past surgical history that includes  section (, ) and Tubal ligation ().     Social History     Socioeconomic History    Marital status: Single     Spouse name: Not on file    Number of children: Not on file    Years of education: Not on file    Highest education level: Not on file   Occupational History    Not on file   Social Needs    Financial resource strain: Not on file    Food insecurity:     Worry: Not on file     Inability: Not on file    Transportation needs:     Medical: Not on file     Non-medical: Not on Kristina Ellis MD       REVIEW OF SYSTEMS    (2-9 systems for level 4, 10 or more for level 5)      Review of Systems   Constitutional: Negative for chills and fever. HENT: Negative for rhinorrhea and sore throat. Eyes: Negative for pain and visual disturbance. Respiratory: Negative for cough and shortness of breath. Cardiovascular: Negative for chest pain and palpitations. Gastrointestinal: Negative for abdominal pain, nausea and vomiting. Genitourinary: Negative for difficulty urinating and dysuria. Musculoskeletal: Negative for arthralgias and myalgias. Right lower extremity swelling and pain   Skin: Negative for color change and wound. Neurological: Negative for weakness, numbness and headaches. Psychiatric/Behavioral: Negative for behavioral problems and dysphoric mood. PHYSICAL EXAM   (up to 7 for level 4, 8 or more for level 5)      INITIAL VITALS:   BP (!) 105/59   Pulse 98   Temp 98.4 °F (36.9 °C) (Oral)   Resp 16   Wt 150 lb (68 kg)   LMP 04/01/2019   SpO2 98%   BMI 27.44 kg/m²     Physical Exam   Constitutional: She is oriented to person, place, and time. She appears well-developed and well-nourished. No distress. HENT:   Head: Normocephalic and atraumatic. Mouth/Throat: Oropharynx is clear and moist.   Eyes: Pupils are equal, round, and reactive to light. EOM are normal.   Neck: Normal range of motion. Cardiovascular: Normal rate and regular rhythm. Pulmonary/Chest: Effort normal. She has no wheezes. She has no rales. Abdominal: Soft. There is no tenderness. There is no rebound and no guarding. Musculoskeletal: Normal range of motion. Right lower extremity significantly larger in size than the left; diffuse tenderness to the right lower extremity, mild pitting edema; strong distal pulses with good cap refill   Neurological: She is alert and oriented to person, place, and time. She has normal strength. GCS eye subscore is 4. GCS verbal subscore is 5. Potassium 3.8 3.7 - 5.3 mmol/L    Chloride 103 98 - 107 mmol/L    CO2 21 20 - 31 mmol/L    Anion Gap 15 9 - 17 mmol/L    GFR Non-African American >60 >60 mL/min    GFR African American >60 >60 mL/min    GFR Comment          GFR Staging NOT REPORTED    Protime-INR   Result Value Ref Range    Protime 9.6 9.0 - 12.0 sec    INR 0.9    APTT   Result Value Ref Range    PTT 25.7 20.5 - 30.5 sec   HCG Qualitative, Serum   Result Value Ref Range    hCG Qual NEGATIVE NEGATIVE       IMPRESSION: Patient presents with complaints of worsening of swelling and pain to the right lower extremity. Patient with a known diagnosis of DVT. Currently on Eliquis. There is concerns for worsening or extension of pre-existing DVT. Vitals are stable, patient nontoxic, resting comfortably on the cot. Her right lower extremity is visibly noticeably larger in size than the left lower extremity. Strong distal pulses, good cap refill. Given presentation, we will plan to obtain some basic labs. Unfortunately, we cannot obtain Doppler studies at this hour. As such, we will plan to load her with Lovenox, have her admitted for Doppler studies and reevaluation. RADIOLOGY:  None    EKG  None    All EKG's are interpreted by the Emergency Department Physician who either signs or Co-signs this chart in the absence of a cardiologist.    EMERGENCY DEPARTMENT COURSE:  Patient updated on lab results. Given worsening of symptoms while already on an anticoagulant, plan is to load the patient with Lovenox and have her admitted for Doppler studies in the morning. There is concerns for possible extension and worsening of her pre-existing DVT. Patient was loaded with Lovenox at 1 mg/kg. Patient agreeable with plans for admission. Medicine team agreeable as well. Patient stable, awaiting transfer to the floor. PROCEDURES:  None    CONSULTS:  IP CONSULT TO HOSPITALIST    CRITICAL CARE:  None    FINAL IMPRESSION      1.  Deep vein thrombosis (DVT) of right lower extremity, unspecified chronicity, unspecified vein ([facility] [address]          DISPOSITION / [address] Admitted 06/22/2019 04:03:20 AM      PATIENT REFERRED TO:  No follow-up provider specified.     DISCHARGE MEDICATIONS:  New Prescriptions    No medications on file       Noah Almanzar MD  Emergency Medicine Resident    (Please note that portions of thisnote were completed with a voice recognition program.  Efforts were made to edit the dictations but occasionally words are mis-transcribed.)       Nedra Segovia MD  Resident  06/22/19 1718

## 2019-06-22 NOTE — H&P
Medication Sig Start Date End Date Taking? Authorizing Provider   apixaban (ELIQUIS) 5 MG TABS tablet Take by mouth 2 times daily   Yes Historical Provider, MD   vitamin B-6 (PYRIDOXINE) 50 MG tablet Take 50 mg by mouth daily   Yes Historical Provider, MD   senna-docusate (Sandre Ankeny) 8.6-50 MG per tablet Take 1 tablet by mouth daily   Yes Historical Provider, MD   losartan (COZAAR) 50 MG tablet Take 50 mg by mouth daily   Yes Historical Provider, MD   acetaminophen (TYLENOL) 500 MG tablet Take 1 tablet by mouth 3 times daily as needed for Pain 12/18/18   Cisco Sánchez MD   ibuprofen (ADVIL;MOTRIN) 800 MG tablet Take 1 tablet by mouth every 8 hours as needed for Pain 8/24/17   Luann Arteaga MD        Allergies:     Adhesive tape    Social History:     Tobacco:    reports that she has been smoking cigarettes. She has been smoking about 0.20 packs per day. She has never used smokeless tobacco.  Alcohol:      reports that she drinks alcohol. Drug Use:  reports that she has current or past drug history. Drug: Marijuana. Family History:     Family History   Problem Relation Age of Onset    High Blood Pressure Mother     High Blood Pressure Maternal Grandmother        Review of Systems:     Positive and Negative as described in HPI. CONSTITUTIONAL:  negative for fevers, chills, sweats, fatigue, weight loss  HEENT:  negative for vision, hearing changes, runny nose, throat pain  RESPIRATORY:  negative for shortness of breath, cough, congestion, wheezing.   CARDIOVASCULAR:  negative for chest pain, palpitations  GASTROINTESTINAL:  negative for nausea, vomiting, diarrhea, constipation, change in bowel habits, abdominal pain   GENITOURINARY:  negative for difficulty of urination, burning with urination, frequency   INTEGUMENT:  negative for rash, skin lesions, easy bruising   HEMATOLOGIC/LYMPHATIC:  Positive for lower extremity swelling  ALLERGIC/IMMUNOLOGIC:  negative for urticaria , itching  ENDOCRINE:  negative increase in drinking, increase in urination, hot or cold intolerance  MUSCULOSKELETAL:  negative joint pains, muscle aches, swelling of joints  NEUROLOGICAL:  negative for headaches, dizziness, lightheadedness, numbness, pain, tingling extremities  BEHAVIOR/PSYCH:  negative for depression, anxiety    Physical Exam:   /71   Pulse 103   Temp 97.8 °F (36.6 °C) (Oral)   Resp 25   Ht 5' 1\" (1.549 m)   Wt 160 lb (72.6 kg)   LMP 2019   SpO2 97%   BMI 30.23 kg/m²   Temp (24hrs), Av °F (36.7 °C), Min:97.8 °F (36.6 °C), Max:98.4 °F (36.9 °C)    No results for input(s): POCGLU in the last 72 hours. No intake or output data in the 24 hours ending 19 1522    General Appearance:  alert, well appearing, and in no acute distress  Mental status: oriented to person, place, and time with normal affect  Head:  normocephalic, atraumatic.   Eye: no icterus, redness, pupils equal and reactive, extraocular eye movements intact, conjunctiva clear  Ear: normal external ear, no discharge, hearing intact  Nose:  no drainage noted  Mouth: mucous membranes moist  Neck: supple, no carotid bruits, thyroid not palpable  Lungs: Bilateral equal air entry, clear to ausculation, no wheezing  Cardiovascular: normal rate, regular rhythm  Abdomen: Soft, nontender, nondistended, normal bowel sounds  Neurologic: There are no new focal motor or sensory deficits, normal muscle tone and bulk, no abnormal sensation, normal speech, cranial nerves II through XII grossly intact  Skin: No gross lesions, rashes, bruising or bleeding on exposed skin area  Extremities:  peripheral pulses palpable, b/l LE swelling  Psych: normal affect    Investigations:      Laboratory Testing:  Recent Results (from the past 24 hour(s))   CBC Auto Differential    Collection Time: 19  2:23 AM   Result Value Ref Range    WBC 12.3 (H) 3.5 - 11.3 k/uL    RBC 4.12 3.95 - 5.11 m/uL    Hemoglobin 13.3 11.9 - 15.1 g/dL    Hematocrit 38.9 36.3 - 47.1 % MCV 94.4 82.6 - 102.9 fL    MCH 32.3 25.2 - 33.5 pg    MCHC 34.2 28.4 - 34.8 g/dL    RDW 16.3 (H) 11.8 - 14.4 %    Platelets 862 856 - 086 k/uL    MPV 9.7 8.1 - 13.5 fL    NRBC Automated 0.0 0.0 per 100 WBC    Differential Type NOT REPORTED     Seg Neutrophils 61 36 - 65 %    Lymphocytes 35 24 - 43 %    Monocytes 3 3 - 12 %    Eosinophils % 1 1 - 4 %    Basophils 0 0 - 2 %    Immature Granulocytes 0 0 %    Segs Absolute 7.35 1.50 - 8.10 k/uL    Absolute Lymph # 4.28 (H) 1.10 - 3.70 k/uL    Absolute Mono # 0.36 0.10 - 1.20 k/uL    Absolute Eos # 0.16 0.00 - 0.44 k/uL    Basophils # 0.05 0.00 - 0.20 k/uL    Absolute Immature Granulocyte 0.05 0.00 - 0.30 k/uL    WBC Morphology NOT REPORTED     RBC Morphology ANISOCYTOSIS PRESENT     Platelet Estimate NOT REPORTED    Basic Metabolic Panel    Collection Time: 06/22/19  2:23 AM   Result Value Ref Range    Glucose 92 70 - 99 mg/dL    BUN 14 6 - 20 mg/dL    CREATININE 0.58 0.50 - 0.90 mg/dL    Bun/Cre Ratio NOT REPORTED 9 - 20    Calcium 9.3 8.6 - 10.4 mg/dL    Sodium 139 135 - 144 mmol/L    Potassium 3.8 3.7 - 5.3 mmol/L    Chloride 103 98 - 107 mmol/L    CO2 21 20 - 31 mmol/L    Anion Gap 15 9 - 17 mmol/L    GFR Non-African American >60 >60 mL/min    GFR African American >60 >60 mL/min    GFR Comment          GFR Staging NOT REPORTED    Protime-INR    Collection Time: 06/22/19  2:23 AM   Result Value Ref Range    Protime 9.6 9.0 - 12.0 sec    INR 0.9    APTT    Collection Time: 06/22/19  2:23 AM   Result Value Ref Range    PTT 25.7 20.5 - 30.5 sec   HCG Qualitative, Serum    Collection Time: 06/22/19  2:23 AM   Result Value Ref Range    hCG Qual NEGATIVE NEGATIVE       Imaging/Diagnostics:    No results found.     Assessment :      Primary Problem  DVT, recurrent, lower extremity, chronic, bilateral [facility])    Active Hospital Problems    Diagnosis Date Noted    Right leg swelling [M79.89] 06/22/2019    DVT, recurrent, lower extremity, chronic, [facility] ([facility] [I82.503] 06/22/2019    History of blood clots [Z86.718] 03/24/2016    Hypertension [I10] 03/24/2016    Smoker [F17.200] 03/24/2016       Plan:     Patient status Admit as inpatient in the  Med/Surge    - Labs, imaging, recent admission reviewed  - Concern for treatment failure on Eliquis  - Start coumadin and lovenox bridge  - Hematology consult given recurrent DVT  - Continue home medications  - DC planning when lovenox approved       Consultations:   IP CONSULT TO HOSPITALIST  IP CONSULT TO HEM/ONC     Patient is admitted as inpatient status because of co-morbidities listed above, severity of signs and symptoms as outlined, requirement for current medical therapies and most importantly because of direct risk to patient if care not provided in a hospital setting.     Eva Womack MD  6/22/2019  3:22 PM

## 2019-06-22 NOTE — ED PROVIDER NOTES
[facility]  ED  Emergency Department  Emergency Medicine Resident Sign-out     Care of Lucila Nickerson was assumed from Dr. Sydnee Slater and is being seen for Leg Pain (Was admitted in May for DVT to bilateral lower extremities. Now RLE edemetous, painful to move and bear weight x3 days)  . The patient's initial evaluation and plan have been discussed with the prior provider who initially evaluated the patient. EMERGENCY DEPARTMENT COURSE / MEDICAL DECISION MAKING:       MEDICATIONS GIVEN:  Orders Placed This Encounter   Medications    oxyCODONE-acetaminophen (PERCOCET) 5-325 MG per tablet 1 tablet    enoxaparin (LOVENOX) injection 70 mg       LABS / RADIOLOGY:     Labs Reviewed   CBC WITH AUTO DIFFERENTIAL - Abnormal; Notable for the following components:       Result Value    WBC 12.3 (*)     RDW 16.3 (*)     Absolute Lymph # 4.28 (*)     All other components within normal limits   BASIC METABOLIC PANEL   PROTIME-INR   APTT   HCG, SERUM, QUALITATIVE       No results found. RECENT VITALS:     Temp: 98.4 °F (36.9 °C),  Pulse: 98, Resp: 16, BP: (!) 105/59, SpO2: 98 %    This patient is a 41 y.o. Female with DVTs and PEs. Admitted to [facility]. Got lovenox 1mg/kg. OUTSTANDING TASKS / RECOMMENDATIONS:    1. Waiting for transport     FINAL IMPRESSION:     1. Deep vein thrombosis (DVT) of right lower extremity, unspecified chronicity, unspecified vein (HCC)        DISPOSITION:         DISPOSITION:  []  Discharge   []  Transfer -    [x]  Admission -  Internal med   []  Against Medical Advice   []  Eloped   FOLLOW-UP: No follow-up provider specified.    DISCHARGE MEDICATIONS: New Prescriptions    No medications on file           Mainor Doyle DO  Emergency Medicine Resident  [address] [place]  Resident  06/22/19 [id number]

## 2019-06-22 NOTE — ED PROVIDER NOTES
[facility]     Emergency Department     Faculty Attestation    I performed a history and physical examination of the patient and discussed management with the resident. I have reviewed and agree with the residents findings including all diagnostic interpretations, and treatment plans as written. Any areas of disagreement are noted on the chart. I was personally present for the key portions of any procedures. I have documented in the chart those procedures where I was not present during the key portions. I have reviewed the emergency nurses triage note. I agree with the chief complaint, past medical history, past surgical history, allergies, medications, social and family history as documented unless otherwise noted below. Documentation of the HPI, Physical Exam and Medical Decision Making performed by scribrolan is based on my personal performance of the HPI, PE and MDM. For Physician Assistant/ Nurse Practitioner cases/documentation I have personally evaluated this patient and have completed at least one if not all key elements of the E/M (history, physical exam, and MDM). Additional findings are as noted. 40 yo F dx with dvt , on eliquis, seen at [facility] with admits,   No fever, no sob, pe vss swelling R > L lower extremities, tender bilateral lower extremity     Lab stable, pt having continued pain with documented dvt, intermed service concur with lovenox, vss, admitted      Pre-hypertension/Hypertension: The patient has been informed that they may have pre-hypertension or Hypertension based on a blood pressure reading in the emergency department. I recommend that the patient call the primary care provider listed on their discharge instructions or a physician of their choice this week to arrange follow up for further evaluation of possible pre-hypertension or Hypertension.       EKG Interpretation    Interpreted by me      CRITICAL CARE: There was a high probability of clinically significant/life threatening deterioration in this patient's condition which required my urgent intervention. Total critical care time was 0  minutes. This excludes any time for separately reportable procedures.        [facility], DO  06/22/19 1202 [facility],   06/22/19 6629

## 2019-06-22 NOTE — ED NOTES
Bed: 01  Expected date:   Expected time:   Means of arrival:   Comments:     Sima Murray RN  06/22/19 [id number]

## 2019-06-22 NOTE — ED NOTES
Report given to [facility]. Patient resting quietly.   Pain 8/10     Alvin Michel RN  06/22/19 6232

## 2019-06-23 VITALS
DIASTOLIC BLOOD PRESSURE: 88 MMHG | SYSTOLIC BLOOD PRESSURE: 152 MMHG | HEART RATE: 92 BPM | HEIGHT: 61 IN | BODY MASS INDEX: 31.17 KG/M2 | TEMPERATURE: 98.4 F | WEIGHT: 165.1 LBS | OXYGEN SATURATION: 100 % | RESPIRATION RATE: 20 BRPM

## 2019-06-23 LAB
ANION GAP SERPL CALCULATED.3IONS-SCNC: 13 MMOL/L (ref 9–17)
BUN BLDV-MCNC: 16 MG/DL (ref 6–20)
BUN/CREAT BLD: ABNORMAL (ref 9–20)
CALCIUM SERPL-MCNC: 8.5 MG/DL (ref 8.6–10.4)
CHLORIDE BLD-SCNC: 105 MMOL/L (ref 98–107)
CO2: 21 MMOL/L (ref 20–31)
CREAT SERPL-MCNC: 0.57 MG/DL (ref 0.5–0.9)
GFR AFRICAN AMERICAN: >60 ML/MIN
GFR NON-AFRICAN AMERICAN: >60 ML/MIN
GFR SERPL CREATININE-BSD FRML MDRD: ABNORMAL ML/MIN/{1.73_M2}
GFR SERPL CREATININE-BSD FRML MDRD: ABNORMAL ML/MIN/{1.73_M2}
GLUCOSE BLD-MCNC: 87 MG/DL (ref 70–99)
HCT VFR BLD CALC: 37.2 % (ref 36.3–47.1)
HEMOGLOBIN: 12.9 G/DL (ref 11.9–15.1)
INR BLD: 0.9
MCH RBC QN AUTO: 32.3 PG (ref 25.2–33.5)
MCHC RBC AUTO-ENTMCNC: 34.7 G/DL (ref 28.4–34.8)
MCV RBC AUTO: 93 FL (ref 82.6–102.9)
NRBC AUTOMATED: 0 PER 100 WBC
PDW BLD-RTO: 15.6 % (ref 11.8–14.4)
PLATELET # BLD: 354 K/UL (ref 138–453)
PMV BLD AUTO: 10.4 FL (ref 8.1–13.5)
POTASSIUM SERPL-SCNC: 3.9 MMOL/L (ref 3.7–5.3)
PROTHROMBIN TIME: 9.5 SEC (ref 9–12)
RBC # BLD: 4 M/UL (ref 3.95–5.11)
SODIUM BLD-SCNC: 139 MMOL/L (ref 135–144)
WBC # BLD: 9.6 K/UL (ref 3.5–11.3)

## 2019-06-23 PROCEDURE — 85610 PROTHROMBIN TIME: CPT

## 2019-06-23 PROCEDURE — 36415 COLL VENOUS BLD VENIPUNCTURE: CPT

## 2019-06-23 PROCEDURE — 99254 IP/OBS CNSLTJ NEW/EST MOD 60: CPT | Performed by: INTERNAL MEDICINE

## 2019-06-23 PROCEDURE — 2580000003 HC RX 258: Performed by: NURSE PRACTITIONER

## 2019-06-23 PROCEDURE — 6370000000 HC RX 637 (ALT 250 FOR IP): Performed by: INTERNAL MEDICINE

## 2019-06-23 PROCEDURE — 80048 BASIC METABOLIC PNL TOTAL CA: CPT

## 2019-06-23 PROCEDURE — 85027 COMPLETE CBC AUTOMATED: CPT

## 2019-06-23 PROCEDURE — 99239 HOSP IP/OBS DSCHRG MGMT >30: CPT | Performed by: INTERNAL MEDICINE

## 2019-06-23 PROCEDURE — 6370000000 HC RX 637 (ALT 250 FOR IP): Performed by: NURSE PRACTITIONER

## 2019-06-23 PROCEDURE — 6360000002 HC RX W HCPCS: Performed by: INTERNAL MEDICINE

## 2019-06-23 RX ORDER — WARFARIN SODIUM 5 MG/1
TABLET ORAL
Qty: 30 TABLET | Refills: 3 | Status: SHIPPED | OUTPATIENT
Start: 2019-06-23 | End: 2019-11-30 | Stop reason: SDUPTHER

## 2019-06-23 RX ORDER — OXYCODONE HYDROCHLORIDE AND ACETAMINOPHEN 5; 325 MG/1; MG/1
1 TABLET ORAL EVERY 4 HOURS PRN
Qty: 15 TABLET | Refills: 0 | Status: SHIPPED | OUTPATIENT
Start: 2019-06-23 | End: 2019-06-30

## 2019-06-23 RX ADMIN — SODIUM CHLORIDE, PRESERVATIVE FREE 10 ML: 5 INJECTION INTRAVENOUS at 08:20

## 2019-06-23 RX ADMIN — LOSARTAN POTASSIUM 50 MG: 50 TABLET, FILM COATED ORAL at 08:08

## 2019-06-23 RX ADMIN — ENOXAPARIN SODIUM 70 MG: 80 INJECTION SUBCUTANEOUS at 07:54

## 2019-06-23 RX ADMIN — WARFARIN SODIUM 5 MG: 5 TABLET ORAL at 14:15

## 2019-06-23 ASSESSMENT — ENCOUNTER SYMPTOMS
RESPIRATORY NEGATIVE: 1
ALLERGIC/IMMUNOLOGIC NEGATIVE: 1
GASTROINTESTINAL NEGATIVE: 1
EYES NEGATIVE: 1
BACK PAIN: 1

## 2019-06-23 NOTE — PLAN OF CARE
Problem: Pain:  Goal: Pain level will decrease  Description  Pain level will decrease  6/22/2019 2120 by Oziel Tian RN  Outcome: Ongoing     Problem: Pain:  Goal: Control of acute pain  Description  Control of acute pain  6/22/2019 2120 by Oziel Tian RN  Outcome: Ongoing     Problem: Pain:  Goal: Control of chronic pain  Description  Control of chronic pain  6/22/2019 2120 by Oziel Tian RN  Outcome: Ongoing     Problem: Falls - Risk of:  Goal: Will remain free from falls  Description  Will remain free from falls  Outcome: Ongoing  Goal: Absence of physical injury  Description  Absence of physical injury  Outcome: Ongoing

## 2019-06-23 NOTE — FLOWSHEET NOTE
 Spiritual Assessment: Pt is a 41year old female. She was awake and had her children in the room. Pt engaged in conversation. Pt was hopeful that she would get to go home today. She was a person of shady and was open to prayer.  Intervention: I knocked and introduced myself. I engaged in conversation with pt. I inquired about shady. She is a [employer]. I offered a prayer and she accepted prayer.  Outcome:  Pt was thankful for the visit and prayer. 06/23/19 1000   Encounter Summary   Services provided to: Patient and family together   Referral/Consult From: [address] [place] [address] Visiting   (6/23/19)   Complexity of Encounter Low   Length of Encounter 15 minutes   Spiritual Assessment Completed Yes   Routine   Type Initial   Assessment Calm; Approachable;Passive; Hopeful   Intervention Active listening;Explored feelings, thoughts, concerns;Sustaining presence/ Ministry of presence;Prayer   Outcome Acceptance;Comfort;Expressed gratitude;Encouraged; Hopeful

## 2019-06-23 NOTE — DISCHARGE SUMMARY
[facility]    Discharge Summary     Patient ID: Jose Eldridge  :  1980   MRN: [id number]     ACCOUNT:  [de-identified]   Patient's PCP: No primary care provider on file. Admit Date: 2019   Discharge Date: 2019  Length of Stay: 1  Code Status:  Prior  Admitting Physician: Barry Escalera MD  Discharge Physician: Barry Escalera MD     Active Discharge Diagnoses:     Hospital Problem Lists:  Principal Problem:    Acute deep vein thrombosis (DVT) of femoral vein of both lower extremities ([address]  Active Problems:    Hypertension    History of blood clots    Smoker    Right leg swelling  Resolved Problems:    DVT, recurrent, lower extremity, chronic, bilateral ([address]      Admission Condition:  fair     Discharged Condition: stable    Hospital Stay:     Hospital Course: The patient is C 20 y.o. F with PMH significant for DVT who presented with lower extremity swelling. Patient was recently admitted to [facility] last month. Initial admission - for lower extremity weakness. Neurology/rheumatology consulted during that admission, unlikely demyelinating disorder, outpatient LP and EMG recommended. Re-admitted to [facility] - for worsening lower extremity pain and weakness. Found to have PE and bilateral lower extremity DVT's. Vascular consulted and DC on Eliquis. LP also done at that time and was unremarkable.      Since DC has had worsening swelling on LE. States she is compliant with Eliquis. Has history of previous DVT 10 years ago and was treated with Coumadin at that time. Denies any recent surgeries, travel, prolonged immobility. No family history of blood clots per patient.      Hematology consulted. Determined to have Eliquis treatment failure. DC on coumadin and lovenox bridge.      Significant therapeutic interventions:     Significant Diagnostic Studies:   Labs / Micro:  CBC:   Lab Results   Component Value Date    WBC 9.6 06/23/2019    RBC 4.00 06/23/2019    HGB 12.9 06/23/2019    HCT 37.2 06/23/2019    MCV 93.0 06/23/2019    MCH 32.3 06/23/2019    MCHC 34.7 06/23/2019    RDW 15.6 06/23/2019     06/23/2019     BMP:    Lab Results   Component Value Date    GLUCOSE 87 06/23/2019     06/23/2019    K 3.9 06/23/2019     06/23/2019    CO2 21 06/23/2019    ANIONGAP 13 06/23/2019    BUN 16 06/23/2019    CREATININE 0.57 06/23/2019    BUNCRER NOT REPORTED 06/23/2019    CALCIUM 8.5 06/23/2019    LABGLOM >60 06/23/2019    GFRAA >60 06/23/2019    GFR      06/23/2019    GFR NOT REPORTED 06/23/2019     HFP:  No results found for: ALB, PROT  CMP:    Lab Results   Component Value Date    GLUCOSE 87 06/23/2019     06/23/2019    K 3.9 06/23/2019     06/23/2019    CO2 21 06/23/2019    BUN 16 06/23/2019    CREATININE 0.57 06/23/2019    ANIONGAP 13 06/23/2019    LABGLOM >60 06/23/2019    GFRAA >60 06/23/2019    GFR      06/23/2019    GFR NOT REPORTED 06/23/2019    CALCIUM 8.5 06/23/2019     PT/INR:  No results found for: PTINR  PTT:   Lab Results   Component Value Date    APTT 25.7 06/22/2019     FLP:  No results found for: CHOL, TRIG, HDL  U/A:  No results found for: Marliss Ahr, SPECGRAV, HGBUR, PHUR, PROTEINU, GLUCOSEU, KETUA, BILIRUBINUR, UROBILINOGEN, NITRU, LEUKOCYTESUR  TSH:  No results found for: TSH     Radiology:  No results found. Consultations:    Consults:     Final Specialist Recommendations/Findings:   IP CONSULT TO HOSPITALIST  IP CONSULT TO HEM/ONC      The patient was seen and examined on day of discharge and this discharge summary is in conjunction with any daily progress note from day of discharge.     Discharge plan:     Disposition: Home    Physician Follow Up:     [facility] Medication Management  [address]  [address] [address]  [phone]  On 6/25/2019  10:00 am    [place]  [address] [place] [id number]  [phone]    has appointment for tomorrow acccording to the patient    Nilda Logan MD  [address] [facility]  [phone]             Requiring Further Evaluation/Follow Up POST HOSPITALIZATION/Incidental Findings:     Diet: regular diet    Activity: As tolerated    Instructions to Patient: follow up with hematology     Discharge Medications:      Medication List      START taking these medications    enoxaparin 80 MG/0.8ML injection  Commonly known as:  LOVENOX  Inject 0.7 mLs into the skin 2 times daily for 5 days     oxyCODONE-acetaminophen 5-325 MG per tablet  Commonly known as:  PERCOCET  Take 1 tablet by mouth every 4 hours as needed for Pain for up to 7 days. warfarin 5 MG tablet  Commonly known as:  COUMADIN  Maintain INR 2-3        CONTINUE taking these medications    acetaminophen 500 MG tablet  Commonly known as:  TYLENOL  Take 1 tablet by mouth 3 times daily as needed for Pain     ibuprofen 800 MG tablet  Commonly known as:  ADVIL;MOTRIN  Take 1 tablet by mouth every 8 hours as needed for Pain     losartan 50 MG tablet  Commonly known as:  COZAAR     senna-docusate 8.6-50 MG per tablet  Commonly known as:  PERICOLACE     vitamin B-6 50 MG tablet  Commonly known as:  PYRIDOXINE        STOP taking these medications    ELIQUIS 5 MG Tabs tablet  Generic drug:  apixaban           Where to Get Your Medications      You can get these medications from any pharmacy    Bring a paper prescription for each of these medications  · enoxaparin 80 MG/0.8ML injection  · oxyCODONE-acetaminophen 5-325 MG per tablet  · warfarin 5 MG tablet         Time Spent on discharge is  37 mins in patient examination, evaluation, counseling as well as medication reconciliation, prescriptions for required medications, discharge plan and follow up.     Electronically signed by   Faiza Branch MD  6/23/2019  6:33 PM

## 2019-06-23 NOTE — PROGRESS NOTES
Hansel Reid [facility]    Progress Note    6/23/2019    4:32 PM    Name:   Maryanne Locke  MRN:     [id number]     [place]:      [de-identified]   Room:   2002/2002-01  IP Day:  1  Admit Date:  6/22/2019 12:59 AM    PCP:   No primary care provider on file. Code Status:  Full Code    Subjective:     C/C:   Chief Complaint   Patient presents with    Leg Pain     Was admitted in May for DVT to bilateral lower extremities. Now RLE edemetous, painful to move and bear weight x3 days     Interval History Status: improved. No acute issues overnight  Swelling stable  No current complaints  Dc planning in progress      Brief History:     The patient is a 41 y.o. F with PMH significant for DVT who presented with lower extremity swelling. Patient was recently admitted to [facility] last month. Initial admission 5/23-5/25 for lower extremity weakness. Neurology/rheumatology consulted during that admission, unlikely demyelinating disorder, outpatient LP and EMG recommended. Re-admitted to [facility] 5/29-6/4 for worsening lower extremity pain and weakness. Found to have PE and bilateral lower extremity DVT's. Vascular consulted and DC on Eliquis. LP also done at that time and was unremarkable.      Since DC has had worsening swelling on LE. States she is compliant with Eliquis. Has history of previous DVT 10 years ago and was treated with Coumadin at that time. Denies any recent surgeries, travel, prolonged immobility. No family history of blood clots per patient.        Review of Systems:     Constitutional:  negative for chills, fevers, sweats  Respiratory:  negative for cough, dyspnea on exertion, hemoptysis, shortness of breath, wheezing  Cardiovascular:  negative for chest pain, chest pressure/discomfort  Gastrointestinal:  negative for abdominal pain, constipation, diarrhea, nausea, vomiting  Neurological:  negative for dizziness, headache    Medications: Allergies: Allergies   Allergen Reactions    Adhesive Tape Rash       Current Meds:   Scheduled Meds:    vitamin B-6  50 mg Oral Daily    sennosides-docusate sodium  1 tablet Oral Daily    losartan  50 mg Oral Daily    sodium chloride flush  10 mL Intravenous 2 times per day    warfarin  5 mg Oral Daily    enoxaparin  1 mg/kg Subcutaneous BID    warfarin (COUMADIN) daily dosing (placeholder)   Other RX Placeholder     Continuous Infusions:   PRN Meds: ibuprofen, sodium chloride flush, potassium chloride **OR** potassium alternative oral replacement **OR** potassium chloride, magnesium sulfate, magnesium hydroxide, ondansetron, nicotine, acetaminophen, oxyCODONE-acetaminophen **OR** oxyCODONE-acetaminophen    Data:     Past Medical History:   has a past medical history of DVT (deep vein thrombosis) in pregnancy ([address] and H/O blood clots. Social History:   reports that she has been smoking cigarettes. She has been smoking about 0.20 packs per day. She has never used smokeless tobacco. She reports that she drinks alcohol. She reports that she has current or past drug history. Drug: Marijuana. Family History:   Family History   Problem Relation Age of Onset    High Blood Pressure Mother     High Blood Pressure Maternal Grandmother        Vitals:  BP (!) 152/88   Pulse 92   Temp 98.4 °F (36.9 °C) (Oral)   Resp 20   Ht 5' 1\" (1.549 m)   Wt 165 lb 1.6 oz (74.9 kg)   LMP 2019   SpO2 100%   BMI 31.20 kg/m²   Temp (24hrs), Av.3 °F (36.8 °C), Min:98.1 °F (36.7 °C), Max:98.4 °F (36.9 °C)    No results for input(s): POCGLU in the last 72 hours. I/O (24Hr):     Intake/Output Summary (Last 24 hours) at 2019 1632  Last data filed at 2019 0103  Gross per 24 hour   Intake 960 ml   Output 300 ml   Net 660 ml       Labs:    Hematology:  Recent Labs     19  0223 19  0510   WBC 12.3* 9.6   RBC 4.12 4.00   HGB 13.3 12.9   HCT 38.9 37.2   MCV 94.4 93.0   MCH 32.3 32.3 MCHC 34.2 34.7   RDW 16.3* 15.6*    354   MPV 9.7 10.4   INR 0.9 0.9     Chemistry:  Recent Labs     06/22/19  0223 06/23/19  0510    139   K 3.8 3.9    105   CO2 21 21   GLUCOSE 92 87   BUN 14 16   CREATININE 0.58 0.57   ANIONGAP 15 13   LABGLOM >60 >60   GFRAA >60 >60   CALCIUM 9.3 8.5*     No results for input(s): PROT, LABALBU, LABA1C, Q1KQEEG, Y8OCXQI, FT4, TSH, AST, ALT, LDH, GGT, ALKPHOS, LABGGT, BILITOT, BILIDIR, AMMONIA, AMYLASE, LIPASE, LACTATE, CHOL, HDL, LDLCHOLESTEROL, CHOLHDLRATIO, TRIG, VLDL, FMX67IT, PHENYTOIN, PHENYF, URICACID, POCGLU in the last 72 hours. Lab Results   Component Value Date/Time    SPECIAL NOT REPORTED 03/24/2016 10:40 PM     No results found for: CULTURE    No results found for: POCPH, PHART, PH, POCPCO2, ZTJ1RHG, PCO2, POCPO2, PO2ART, PO2, POCHCO3, MFW3IHC, HCO3, NBEA, PBEA, BEART, BE, THGBART, THB, KSU0XQQ, XANA4NIH, Z7TTOIOU, O2SAT, FIO2    Radiology:    No results found.     Physical Examination:        General appearance:  alert, cooperative and no distress  Mental Status:  oriented to person, place and time and normal affect  Lungs:  clear to auscultation bilaterally, normal effort  Heart:  regular rate and rhythm  Abdomen:  soft, nontender, nondistended, normal bowel sounds  Extremities:  no edema, redness, tenderness in the calves  Skin:  no gross lesions, rashes, induration    Assessment:        Primary Problem  Acute deep vein thrombosis (DVT) of femoral vein of both lower extremities [facility])    Active Hospital Problems    Diagnosis Date Noted    Right leg swelling [M79.89] 06/22/2019    Acute deep vein thrombosis (DVT) of femoral vein of both lower extremities (HCC) [I82.413] 06/22/2019    History of blood clots [Z86.718] 03/24/2016    Hypertension [I10] 03/24/2016    Smoker [F17.200] 03/24/2016       Plan:        - Hematology consulted  - Continue coumadin, lovenox bridge  - DC planning today after hematology evaluation  - Pain control  - Labs and medications reviewed      Stephany Tao MD  6/23/2019  4:32 PM

## 2019-06-23 NOTE — DISCHARGE INSTR - DIET

## 2019-06-23 NOTE — CARE COORDINATION
Patient to see PCP tomorrow, coumadin clinic appointment made for 19 at 10 am. Order faxed.   Patient provided with Lovenox education and sharps container    Discharge 1 [facility] Case Management Department  Written by: Norah Purdy RN    Patient Name: Edgardo Nelson  Attending Provider: Maria Del Carmen Mckeon MD  Admit Date: 2019 12:59 AM  MRN: [id number]  Account: [de-identified]                     : 1980  Discharge Date:  2019        Disposition: home    Norah Purdy RN

## 2019-06-23 NOTE — CONSULTS
CONSULT NOTE    PCP: No primary care provider on file. Referring Provider: No ref. provider found    VISIT DIAGNOSIS:  The encounter diagnosis was Deep vein thrombosis (DVT) of right lower extremity, unspecified chronicity, unspecified vein ([facility] [address] CC: Recurrent DVT   SUBJECTIVE/HPI:  Lexus Sexton is a very pleasant 41 y.o. femalewho has a history of DVT presented with lower ext DVT, just recently d/c at [facility] for lower ext pain and weakness, found to have PE and lower ext DVT, d/c on eliquis, since that time, she had worsening swelling of lower ext and was found to have acute lower ext dvt. She claims she was complaint on the Eliquis, she has a history of previous VTE 10 years ago and states was on coumadin for that time without any issues on coumadin. No bleeding, sob or chest pain. Denies recent trauma, surgery or new medications. Denies family history of VTE.      PAST MEDICAL HISTORY:      Diagnosis Date    DVT (deep vein thrombosis) in pregnancy ([facility] [address]     H/O blood clots     PE, legs B/L, no trauma, h/o Depo provera use       PAST SURGICAL HISTORY:      Procedure Laterality Date     SECTION  , 2008    x 2    TUBAL LIGATION         CURRENT MEDICATIONS:   Current Facility-Administered Medications   Medication Dose Route Frequency Provider Last Rate Last Dose    vitamin B-6 (PYRIDOXINE) tablet 50 mg  50 mg Oral Daily Marielle Caldera APRN - CNP   50 mg at 19 0908    sennosides-docusate sodium (SENOKOT-S) 8.6-50 MG tablet 1 tablet  1 tablet Oral Daily Marielle Caldera APRN - CNP        losartan (COZAAR) tablet 50 mg  50 mg Oral Daily Marielle Caldera, APRN - CNP   50 mg at 19 0808    ibuprofen (ADVIL;MOTRIN) tablet 800 mg  800 mg Oral Q8H PRN Marielle Caldera APRN - CNP        sodium chloride flush 0.9 % injection 10 mL  10 mL Intravenous 2 times per day Omar Caldera APRN - CNP   10 mL at 19    sodium chloride flush 0.9 % injection 10 mL  10 mL Intravenous PRN Mariellejena Haganossnoemi, APRN - CNP        potassium chloride (KLOR-CON M) extended release tablet 40 mEq  40 mEq Oral PRN Marielle Haganosso, APRN - CNP        Or    potassium bicarb-citric acid (EFFER-K) effervescent tablet 40 mEq  40 mEq Oral PRN Marielle KIKE Haganosso, APRN - CNP        Or    potassium chloride 10 mEq/100 mL IVPB (Peripheral Line)  10 mEq Intravenous PRN Marielle Taveraso, APRN - CNP        magnesium sulfate 1 g in dextrose 5% 100 mL IVPB  1 g Intravenous PRN Mariellejena Haganosso, APRN - CNP        magnesium hydroxide (MILK OF MAGNESIA) 400 MG/5ML suspension 30 mL  30 mL Oral Daily PRN Marielle Caldera, APRN - CNP        ondansetron (ZOFRAN) injection 4 mg  4 mg Intravenous Q6H PRN Marielle Caldera, APRN - CNP        nicotine (NICODERM CQ) 21 MG/24HR 1 patch  1 patch Transdermal Daily PRN Marielle Caldera, APRN - CNP        acetaminophen (TYLENOL) tablet 650 mg  650 mg Oral Q4H PRN Marielle Caldera, APRN - CNP        oxyCODONE-acetaminophen (PERCOCET) 5-325 MG per tablet 1 tablet  1 tablet Oral Q4H PRN Marielle Caldera APRN - CNP        Or    oxyCODONE-acetaminophen (PERCOCET) 5-325 MG per tablet 2 tablet  2 tablet Oral Q4H PRN Marielle Caldera, APRN - CNP   2 tablet at 06/22/19 0708    warfarin (COUMADIN) tablet 5 mg  5 mg Oral Daily Mercedes Hinds MD   5 mg at 06/22/19 1749    enoxaparin (LOVENOX) injection 70 mg  1 mg/kg Subcutaneous BID Mercedes Hinds MD   70 mg at 06/23/19 0754    warfarin (COUMADIN) daily dosing (placeholder)   Other RX Placeholder Mercedes Hinds MD           ALLERGIES:   Allergies   Allergen Reactions    Adhesive Tape Rash       FAMILY HISTORY:       Problem Relation Age of Onset    High Blood Pressure Mother     High Blood Pressure Maternal Grandmother        SOCIAL HISTORY:  Social History     Tobacco Use    Smoking status: Current Every Day Smoker     Packs/day: 0.20     Types: Cigarettes    Smokeless tobacco: Never Used   Substance Use Topics    Alcohol use: Yes     Alcohol/week: 0.0 oz     Comment: socially     Drug use: Yes     Types: Marijuana       REVIEW OF SYSTEMS:   Review of Systems   Constitutional: Positive for activity change and fatigue. Negative for appetite change, chills and diaphoresis. HENT: Negative. Eyes: Negative. Respiratory: Negative. Cardiovascular: Positive for leg swelling. Negative for chest pain and palpitations. Gastrointestinal: Negative. Endocrine: Negative. Genitourinary: Negative. Musculoskeletal: Positive for back pain. Negative for arthralgias, gait problem, joint swelling and myalgias. Skin: Negative. Allergic/Immunologic: Negative. Neurological: Negative. Hematological: Negative. Psychiatric/Behavioral: Negative. PHYSICAL EXAM:   Vitals:    06/23/19 0800   BP: (!) 152/88   Pulse: 92   Resp: 20   Temp: 98.4 °F (36.9 °C)   SpO2:        Physical Exam   Constitutional: She is oriented to person, place, and time. She appears well-developed and well-nourished. HENT:   Head: Normocephalic and atraumatic. Eyes: Pupils are equal, round, and reactive to light. Conjunctivae and EOM are normal.   Neck: Normal range of motion. Neck supple. Cardiovascular: Normal rate and regular rhythm. Pulmonary/Chest: Effort normal and breath sounds normal. No respiratory distress. Abdominal: Soft. Bowel sounds are normal.   Musculoskeletal: She exhibits edema. Neurological: She is alert and oriented to person, place, and time. Skin: Skin is warm and dry. Psychiatric: She has a normal mood and affect.  Her behavior is normal. Thought content normal.       LABS:   Results for orders placed or performed during the hospital encounter of 06/22/19   CBC Auto Differential   Result Value Ref Range    WBC 12.3 (H) 3.5 - 11.3 k/uL    RBC 4.12 3.95 - 5.11 m/uL    Hemoglobin 13.3 11.9 - 15.1 g/dL    Hematocrit 38.9 36.3 - 47.1 %    MCV 94.4 82.6 GFR Staging NOT REPORTED    CBC   Result Value Ref Range    WBC 9.6 3.5 - 11.3 k/uL    RBC 4.00 3.95 - 5.11 m/uL    Hemoglobin 12.9 11.9 - 15.1 g/dL    Hematocrit 37.2 36.3 - 47.1 %    MCV 93.0 82.6 - 102.9 fL    MCH 32.3 25.2 - 33.5 pg    MCHC 34.7 28.4 - 34.8 g/dL    RDW 15.6 (H) 11.8 - 14.4 %    Platelets 080 577 - 005 k/uL    MPV 10.4 8.1 - 13.5 fL    NRBC Automated 0.0 0.0 per 100 WBC   Protime-INR   Result Value Ref Range    Protime 9.5 9.0 - 12.0 sec    INR 0.9        IMPRESSION:   41year old female with recurrent DVT, clotted on Eliquis   1. Deep vein thrombosis (DVT) of right lower extremity, unspecified chronicity, unspecified vein (HCC)        Patient Active Problem List   Diagnosis    Hypertension    History of blood clots    Smoker    History of chlamydia    Right leg swelling    Acute deep vein thrombosis (DVT) of femoral vein of both lower extremities (HCC)       PLAN:     1. Agree with life long coumadin  2. Will be bridged with Lovenox  3. Discussed given recurrent clots will need life long anticoaguluation  4. Discussed risk modifications for recurrent clots  5. Discussed I would deem her Eliquis failure  6. Discussed importance of age appropriate malignancy screening.        Electronically signed by Gaby Anderson, [id number] [facility] 6/23/2019 at 12:19 PM

## 2019-06-24 ENCOUNTER — TELEPHONE (OUTPATIENT)
Dept: PHARMACY | Age: 39
End: 2019-06-24

## 2019-06-25 ENCOUNTER — TELEPHONE (OUTPATIENT)
Dept: PHARMACY | Age: 39
End: 2019-06-25

## 2019-06-25 NOTE — TELEPHONE ENCOUNTER
Patient was a No Call No Show for Initial [facility] appointment today. Called to reschedule, left voicemail. [address] [place] [address]  Ph., CACP, Clinical Pharmacist  Anticoagulation Services, [address] [place] [address] Coumadin Clinic  6/25/2019  11:27 AM

## 2019-07-16 ENCOUNTER — TELEPHONE (OUTPATIENT)
Dept: PHARMACY | Age: 39
End: 2019-07-16

## 2019-07-23 ENCOUNTER — TELEPHONE (OUTPATIENT)
Dept: PHARMACY | Age: 39
End: 2019-07-23

## 2019-07-29 ENCOUNTER — TELEPHONE (OUTPATIENT)
Dept: PHARMACY | Age: 39
End: 2019-07-29

## 2019-07-29 NOTE — TELEPHONE ENCOUNTER
Patient was a No Call No Show for First [facility] appointment today. Called to reschedule, and patient informed me she is having her INR check done a [facility] and wishes to continue with them. I will close the referral.    Miguel Ángel CANDELARIO.  Ph., CACP, Clinical Pharmacist  Anticoagulation Services, [address] [place] [address] Coumadin Clinic  7/29/2019  10:50 AM

## 2019-11-30 ENCOUNTER — HOSPITAL ENCOUNTER (EMERGENCY)
Age: 39
Discharge: HOME | End: 2019-11-30
Attending: EMERGENCY MEDICINE
Payer: MEDICARE

## 2019-11-30 VITALS
DIASTOLIC BLOOD PRESSURE: 101 MMHG | HEART RATE: 106 BPM | OXYGEN SATURATION: 98 % | WEIGHT: 165 LBS | HEIGHT: 62 IN | RESPIRATION RATE: 16 BRPM | TEMPERATURE: 98.6 F | SYSTOLIC BLOOD PRESSURE: 138 MMHG | BODY MASS INDEX: 30.36 KG/M2

## 2019-11-30 DIAGNOSIS — T25.122A SUPERFICIAL BURN OF LEFT FOOT, INITIAL ENCOUNTER: ICD-10-CM

## 2019-11-30 DIAGNOSIS — T25.221A PARTIAL THICKNESS BURN OF RIGHT FOOT, INITIAL ENCOUNTER: Primary | ICD-10-CM

## 2019-11-30 DIAGNOSIS — Z86.718 HISTORY OF DVT (DEEP VEIN THROMBOSIS): ICD-10-CM

## 2019-11-30 LAB
INR BLD: 0.9
PROTHROMBIN TIME: 10 SEC (ref 9–12)

## 2019-11-30 PROCEDURE — 6360000002 HC RX W HCPCS: Performed by: STUDENT IN AN ORGANIZED HEALTH CARE EDUCATION/TRAINING PROGRAM

## 2019-11-30 PROCEDURE — 85610 PROTHROMBIN TIME: CPT

## 2019-11-30 PROCEDURE — 6370000000 HC RX 637 (ALT 250 FOR IP): Performed by: STUDENT IN AN ORGANIZED HEALTH CARE EDUCATION/TRAINING PROGRAM

## 2019-11-30 PROCEDURE — 96372 THER/PROPH/DIAG INJ SC/IM: CPT

## 2019-11-30 PROCEDURE — 99283 EMERGENCY DEPT VISIT LOW MDM: CPT

## 2019-11-30 RX ORDER — IBUPROFEN 800 MG/1
800 TABLET ORAL ONCE
Status: COMPLETED | OUTPATIENT
Start: 2019-11-30 | End: 2019-11-30

## 2019-11-30 RX ORDER — WARFARIN SODIUM 5 MG/1
5 TABLET ORAL ONCE
Status: COMPLETED | OUTPATIENT
Start: 2019-11-30 | End: 2019-11-30

## 2019-11-30 RX ORDER — OXYCODONE HYDROCHLORIDE 5 MG/1
5 TABLET ORAL ONCE
Status: COMPLETED | OUTPATIENT
Start: 2019-11-30 | End: 2019-11-30

## 2019-11-30 RX ORDER — ACETAMINOPHEN 500 MG
1000 TABLET ORAL ONCE
Status: COMPLETED | OUTPATIENT
Start: 2019-11-30 | End: 2019-11-30

## 2019-11-30 RX ORDER — ACETAMINOPHEN 500 MG
1000 TABLET ORAL EVERY 8 HOURS PRN
Qty: 60 TABLET | Refills: 0 | Status: ON HOLD | OUTPATIENT
Start: 2019-11-30 | End: 2021-04-19 | Stop reason: HOSPADM

## 2019-11-30 RX ORDER — GINSENG 100 MG
CAPSULE ORAL ONCE
Status: COMPLETED | OUTPATIENT
Start: 2019-11-30 | End: 2019-11-30

## 2019-11-30 RX ORDER — WARFARIN SODIUM 5 MG/1
5 TABLET ORAL DAILY
Qty: 10 TABLET | Refills: 0 | Status: SHIPPED | OUTPATIENT
Start: 2019-11-30 | End: 2020-03-30 | Stop reason: SDUPTHER

## 2019-11-30 RX ORDER — OXYCODONE HYDROCHLORIDE 5 MG/1
5 TABLET ORAL EVERY 8 HOURS PRN
Qty: 10 TABLET | Refills: 0 | Status: SHIPPED | OUTPATIENT
Start: 2019-11-30 | End: 2019-12-04

## 2019-11-30 RX ADMIN — OXYCODONE HYDROCHLORIDE 5 MG: 5 TABLET ORAL at 20:11

## 2019-11-30 RX ADMIN — WARFARIN SODIUM 5 MG: 5 TABLET ORAL at 21:25

## 2019-11-30 RX ADMIN — ENOXAPARIN SODIUM 70 MG: 80 INJECTION SUBCUTANEOUS at 21:05

## 2019-11-30 RX ADMIN — ACETAMINOPHEN 1000 MG: 500 TABLET ORAL at 20:11

## 2019-11-30 RX ADMIN — BACITRACIN: 500 OINTMENT TOPICAL at 20:45

## 2019-11-30 RX ADMIN — IBUPROFEN 800 MG: 800 TABLET, FILM COATED ORAL at 20:11

## 2019-11-30 ASSESSMENT — ENCOUNTER SYMPTOMS
NAUSEA: 0
ABDOMINAL PAIN: 0
VOMITING: 0
SHORTNESS OF BREATH: 0
COUGH: 0
ABDOMINAL DISTENTION: 0
WHEEZING: 0

## 2019-11-30 ASSESSMENT — PAIN DESCRIPTION - LOCATION: LOCATION: FOOT

## 2019-11-30 ASSESSMENT — PAIN SCALES - GENERAL
PAINLEVEL_OUTOF10: 10
PAINLEVEL_OUTOF10: 8
PAINLEVEL_OUTOF10: 10

## 2019-11-30 ASSESSMENT — PAIN DESCRIPTION - ORIENTATION: ORIENTATION: RIGHT

## 2019-11-30 ASSESSMENT — PAIN DESCRIPTION - PAIN TYPE: TYPE: ACUTE PAIN

## 2020-03-30 ENCOUNTER — HOSPITAL ENCOUNTER (EMERGENCY)
Age: 40
Discharge: HOME | End: 2020-03-30
Attending: EMERGENCY MEDICINE
Payer: MEDICARE

## 2020-03-30 ENCOUNTER — APPOINTMENT (OUTPATIENT)
Dept: CT IMAGING | Age: 40
End: 2020-03-30
Payer: MEDICARE

## 2020-03-30 VITALS
SYSTOLIC BLOOD PRESSURE: 155 MMHG | BODY MASS INDEX: 30.18 KG/M2 | OXYGEN SATURATION: 99 % | DIASTOLIC BLOOD PRESSURE: 73 MMHG | HEART RATE: 84 BPM | RESPIRATION RATE: 16 BRPM | TEMPERATURE: 97.3 F | WEIGHT: 165 LBS

## 2020-03-30 LAB
ALLEN TEST: ABNORMAL
ANION GAP SERPL CALCULATED.3IONS-SCNC: 17 MMOL/L (ref 9–17)
ANION GAP: 7 MMOL/L (ref 7–16)
BUN BLDV-MCNC: 16 MG/DL (ref 6–20)
BUN/CREAT BLD: ABNORMAL (ref 9–20)
CALCIUM SERPL-MCNC: 8.5 MG/DL (ref 8.6–10.4)
CHLORIDE BLD-SCNC: 105 MMOL/L (ref 98–107)
CO2: 20 MMOL/L (ref 20–31)
CREAT SERPL-MCNC: 0.49 MG/DL (ref 0.5–0.9)
D-DIMER QUANTITATIVE: 1.71 MG/L FEU
FIO2: ABNORMAL
GFR AFRICAN AMERICAN: >60 ML/MIN
GFR NON-AFRICAN AMERICAN: >60 ML/MIN
GFR NON-AFRICAN AMERICAN: >60 ML/MIN
GFR SERPL CREATININE-BSD FRML MDRD: >60 ML/MIN
GFR SERPL CREATININE-BSD FRML MDRD: ABNORMAL ML/MIN/{1.73_M2}
GFR SERPL CREATININE-BSD FRML MDRD: ABNORMAL ML/MIN/{1.73_M2}
GFR SERPL CREATININE-BSD FRML MDRD: NORMAL ML/MIN/{1.73_M2}
GLUCOSE BLD-MCNC: 58 MG/DL (ref 70–99)
GLUCOSE BLD-MCNC: 61 MG/DL (ref 74–100)
GLUCOSE BLD-MCNC: 94 MG/DL (ref 65–105)
HCO3 VENOUS: 24.7 MMOL/L (ref 22–29)
INR BLD: 0.9
MODE: ABNORMAL
NEGATIVE BASE EXCESS, VEN: 3 (ref 0–2)
O2 DEVICE/FLOW/%: ABNORMAL
O2 SAT, VEN: 48 % (ref 60–85)
PARTIAL THROMBOPLASTIN TIME: 25.9 SEC (ref 20.5–30.5)
PATIENT TEMP: ABNORMAL
PCO2, VEN: 54 MM HG (ref 41–51)
PH VENOUS: 7.27 (ref 7.32–7.43)
PO2, VEN: 30.2 MM HG (ref 30–50)
POC CHLORIDE: 116 MMOL/L (ref 98–107)
POC CREATININE: 0.54 MG/DL (ref 0.51–1.19)
POC HEMATOCRIT: 47 % (ref 36–46)
POC HEMOGLOBIN: 15.9 G/DL (ref 12–16)
POC IONIZED CALCIUM: 1.02 MMOL/L (ref 1.15–1.33)
POC LACTIC ACID: 2.7 MMOL/L (ref 0.56–1.39)
POC PCO2 TEMP: ABNORMAL MM HG
POC PH TEMP: ABNORMAL
POC PO2 TEMP: ABNORMAL MM HG
POC POTASSIUM: 3.5 MMOL/L (ref 3.5–4.5)
POC SODIUM: 148 MMOL/L (ref 138–146)
POSITIVE BASE EXCESS, VEN: ABNORMAL (ref 0–3)
POTASSIUM SERPL-SCNC: 3.5 MMOL/L (ref 3.7–5.3)
PROTHROMBIN TIME: 9.5 SEC (ref 9–12)
SAMPLE SITE: ABNORMAL
SODIUM BLD-SCNC: 142 MMOL/L (ref 135–144)
TOTAL CO2, VENOUS: 26 MMOL/L (ref 23–30)

## 2020-03-30 PROCEDURE — 84132 ASSAY OF SERUM POTASSIUM: CPT

## 2020-03-30 PROCEDURE — 6360000004 HC RX CONTRAST MEDICATION: Performed by: EMERGENCY MEDICINE

## 2020-03-30 PROCEDURE — 85014 HEMATOCRIT: CPT

## 2020-03-30 PROCEDURE — 82565 ASSAY OF CREATININE: CPT

## 2020-03-30 PROCEDURE — 85610 PROTHROMBIN TIME: CPT

## 2020-03-30 PROCEDURE — 82330 ASSAY OF CALCIUM: CPT

## 2020-03-30 PROCEDURE — 85730 THROMBOPLASTIN TIME PARTIAL: CPT

## 2020-03-30 PROCEDURE — 6360000002 HC RX W HCPCS: Performed by: STUDENT IN AN ORGANIZED HEALTH CARE EDUCATION/TRAINING PROGRAM

## 2020-03-30 PROCEDURE — 83605 ASSAY OF LACTIC ACID: CPT

## 2020-03-30 PROCEDURE — 82947 ASSAY GLUCOSE BLOOD QUANT: CPT

## 2020-03-30 PROCEDURE — 82803 BLOOD GASES ANY COMBINATION: CPT

## 2020-03-30 PROCEDURE — 80048 BASIC METABOLIC PNL TOTAL CA: CPT

## 2020-03-30 PROCEDURE — 93970 EXTREMITY STUDY: CPT

## 2020-03-30 PROCEDURE — 71260 CT THORAX DX C+: CPT

## 2020-03-30 PROCEDURE — 96372 THER/PROPH/DIAG INJ SC/IM: CPT

## 2020-03-30 PROCEDURE — 84295 ASSAY OF SERUM SODIUM: CPT

## 2020-03-30 PROCEDURE — 6370000000 HC RX 637 (ALT 250 FOR IP): Performed by: STUDENT IN AN ORGANIZED HEALTH CARE EDUCATION/TRAINING PROGRAM

## 2020-03-30 PROCEDURE — 82435 ASSAY OF BLOOD CHLORIDE: CPT

## 2020-03-30 PROCEDURE — 99284 EMERGENCY DEPT VISIT MOD MDM: CPT

## 2020-03-30 PROCEDURE — 85379 FIBRIN DEGRADATION QUANT: CPT

## 2020-03-30 RX ORDER — WARFARIN SODIUM 5 MG/1
5 TABLET ORAL DAILY
Qty: 14 TABLET | Refills: 0 | Status: ON HOLD | OUTPATIENT
Start: 2020-03-30 | End: 2021-04-19 | Stop reason: HOSPADM

## 2020-03-30 RX ORDER — WARFARIN SODIUM 5 MG/1
5 TABLET ORAL ONCE
Status: COMPLETED | OUTPATIENT
Start: 2020-03-30 | End: 2020-03-30

## 2020-03-30 RX ADMIN — ENOXAPARIN SODIUM 70 MG: 80 INJECTION SUBCUTANEOUS at 20:02

## 2020-03-30 RX ADMIN — IOHEXOL 75 ML: 350 INJECTION, SOLUTION INTRAVENOUS at 17:17

## 2020-03-30 RX ADMIN — WARFARIN SODIUM 5 MG: 5 TABLET ORAL at 20:02

## 2020-03-30 ASSESSMENT — ENCOUNTER SYMPTOMS
COUGH: 0
BACK PAIN: 0
ABDOMINAL DISTENTION: 0
WHEEZING: 0
VOMITING: 0
SHORTNESS OF BREATH: 0
ABDOMINAL PAIN: 0
NAUSEA: 0

## 2020-03-30 ASSESSMENT — PAIN DESCRIPTION - PAIN TYPE: TYPE: CHRONIC PAIN

## 2020-03-30 ASSESSMENT — PAIN DESCRIPTION - FREQUENCY: FREQUENCY: CONTINUOUS

## 2020-03-30 ASSESSMENT — PAIN DESCRIPTION - ORIENTATION: ORIENTATION: RIGHT;LEFT

## 2020-03-30 ASSESSMENT — PAIN DESCRIPTION - DESCRIPTORS: DESCRIPTORS: ACHING

## 2020-03-30 ASSESSMENT — PAIN SCALES - GENERAL: PAINLEVEL_OUTOF10: 10

## 2020-03-30 ASSESSMENT — PAIN DESCRIPTION - LOCATION: LOCATION: LEG

## 2020-03-30 NOTE — CONSULTS
Elastic Bandages & Supports (MEDICAL COMPRESSION THIGH HIGH) MISC, Place compression stocking on each leg when you wake up. Take the stockings off at night when you go to bed.   Apply them as soon as you can in the morning and take them off just before you go to sleep., Disp: 2 each, Rfl: 0    acetaminophen (TYLENOL) 500 MG tablet, Take 2 tablets by mouth every 8 hours as needed for Pain, Disp: 60 tablet, Rfl: 0    vitamin B-6 (PYRIDOXINE) 50 MG tablet, Take 50 mg by mouth daily, Disp: , Rfl:     senna-docusate (PERICOLACE) 8.6-50 MG per tablet, Take 1 tablet by mouth daily, Disp: , Rfl:     losartan (COZAAR) 50 MG tablet, Take 50 mg by mouth daily, Disp: , Rfl:     acetaminophen (TYLENOL) 500 MG tablet, Take 1 tablet by mouth 3 times daily as needed for Pain, Disp: 21 tablet, Rfl: 0    ibuprofen (ADVIL;MOTRIN) 800 MG tablet, Take 1 tablet by mouth every 8 hours as needed for Pain, Disp: 30 tablet, Rfl: 0    REVIEW OF SYSTEMS:      Review of Systems - General ROS: negative for - chills, fatigue, fever or night sweats  Psychological ROS: negative for - anxiety, behavioral disorder or depression  Ophthalmic ROS: negative for - blurry vision, dry eyes or eye pain  ENT ROS: negative for - epistaxis, headaches or nasal congestion  Hematological and Lymphatic ROS: positive for - history of DVTs, PE  Endocrine ROS: negative for - malaise/lethargy, mood swings or palpitations  Respiratory ROS: negative for - cough, hemoptysis or shortness of breath  Cardiovascular ROS: no chest pain or dyspnea on exertion  Gastrointestinal ROS: no abdominal pain, constipation, hematochezia  Genito-Urinary ROS: no dysuria, trouble voiding, or hematuria  Musculoskeletal ROS: negative for - joint pain, joint swelling or muscle pain  Neurological ROS: negative for - TIA or stroke like symptom  Dermatological ROS: negative for dry skin and eczema      PHYSICAL EXAM:    VITALS:  BP (!) 155/73   Pulse 84   Temp 97.3 °F (36.3 °C) (Oral) compressibility of the great saphenous vein. The small saphenous vein is non-compressible. Enlarged lymph node noted in the left groin. Impression:  Patient Active Problem List   Diagnosis    Hypertension    History of blood clots    Smoker    History of chlamydia    Right leg swelling    Acute deep vein thrombosis (DVT) of femoral vein of both lower extremities (HCC)       3 46year old female with history of DVTs, PE off of home coumadin who presents with BLE pain and swelling    Recommendation:  1. Will follow up d dimer to evaluate is acute or chronic DVT  2. Recommend compression to bilateral lower extremities  3. OK for discharge home with lovenox bridge to coumadin - patient has done bridge previously and feels comfortable doing this at home. Very adamant that she does not want admission under any circumstances  4. Follow up outpatient with PCP and Dr Lina Garcia    Patient and plan discussed with Dr Cr Peguero, who is in agreement.     Miguel Blake MD  General Surgery PGY2  Pager Number: [phone]

## 2020-03-30 NOTE — ED PROVIDER NOTES
physician)  on 03/30/2020 08:16 PM  ----------------------------------------------------------------  Findings:   Right Impression:                           Left Impression: The mid to distal femoral vein are          The popliteal is partially  minimally partially compressible. The       compressible. popliteal vein is non-compressible and      The common femoral, femoral  distended with a small trickle of blood     and tibial veins demonstrate  flow and mostly hyperechoic echoes. normal compressibility and                                              augmentation. The common femoral and tibial veins         Normal compressibility of the  demonstrate normal compressibility and      great saphenous vein. augmentation. The small saphenous vein is  Normal compressibility of the great         non-compressible. saphenous vein. Enlarged lymph node noted in  The small saphenous vein is                 the left groin. non-compressible. Risk Factors History +-----------+----------+---------------------------------------------------+ ! Diagnosis  ! Date      ! Comments                                           ! +-----------+----------+---------------------------------------------------+ ! DVT        !07/29/2009! Left popliteal partially compressible, posterior   ! !           !          !tibial and peroneal veins non-compressible. ! +-----------+----------+---------------------------------------------------+ ! Pulmonary  !          !                                                   ! !Embolism   !          !                                                   ! +-----------+----------+---------------------------------------------------+ ! Previous   !06/22/2019! venous-rt fem and pop partially compressible,      !  !Scan       !          !gastroc and small saphenous vein non-compressible, ! !           !          !left fem, pop and gastroc !Yes       !Yes            ! None      ! +------------------------------------+----------+---------------+----------+ ! Prox Femoral                        !Yes       ! Yes            ! None      ! +------------------------------------+----------+---------------+----------+ ! Mid Femoral                         !Yes       ! Yes            ! None      ! +------------------------------------+----------+---------------+----------+ ! Dist Femoral                        !Yes       ! Yes            ! None      ! +------------------------------------+----------+---------------+----------+ ! Deep Femoral                        !No        !               !          ! +------------------------------------+----------+---------------+----------+ ! Popliteal                           !Yes       ! Partial        !AI        ! +------------------------------------+----------+---------------+----------+ ! Sapheno Femoral Junction            ! Yes       ! Yes            ! None      ! +------------------------------------+----------+---------------+----------+ ! PTV                                 ! Yes       ! Yes            ! None      ! +------------------------------------+----------+---------------+----------+ ! Peroneal                            !Yes       ! Yes            ! None      ! +------------------------------------+----------+---------------+----------+ ! Gastroc                             ! Yes       ! Yes            ! None      ! +------------------------------------+----------+---------------+----------+ ! GSV Thigh                           ! Yes       ! Yes            ! None      ! +------------------------------------+----------+---------------+----------+ ! GSV Knee                            ! Yes       ! Yes            ! None      ! +------------------------------------+----------+---------------+----------+ ! GSV Ankle                           ! Yes       ! Yes            ! None      ! chronic. D-dimer was elevated at 1.71. Patient was given 1 mg/kg subcutaneous Lovenox, 5 mg warfarin. ACE wraps were applied tightly to bilateral lower extremities. Patient given prescription for Lovenox 1 mg/kg subcutaneous twice daily for the next 7 days, and 2 weeks worth of 5 mg warfarin. It was explained to patient multiple times with the absolute need for her to follow-up with her vascular surgeon as soon as possible and to stay on her anticoagulant medication. Patient voiced understanding of these instructions. MDM  Number of Diagnoses or Management Options  Leg DVT (deep venous thromboembolism), chronic, bilateral ([address]): established, worsening     Amount and/or Complexity of Data Reviewed  Clinical lab tests: ordered and reviewed  Tests in the radiology section of CPT®: ordered and reviewed  Review and summarize past medical records: yes  Discuss the patient with other providers: yes  Independent visualization of images, tracings, or specimens: yes    Risk of Complications, Morbidity, and/or Mortality  Presenting problems: moderate  Diagnostic procedures: moderate  Management options: moderate    Patient Progress  Patient progress: stable      PROCEDURES:  none    CONSULTS:  IP CONSULT TO VASCULAR SURGERY    CRITICAL CARE:  Please see attending note    FINAL IMPRESSION     1. Leg DVT (deep venous thromboembolism), chronic, bilateral ([facility] [address]          DISPOSITION / PLAN   DISPOSITION Decision To Discharge 03/30/2020 07:29:57 PM      Evaluation and treatment course in the ED, and plan of care upon discharge was discussed in length with the patient. Patient had no further questions prior to being discharged and was instructed to return to the ED for new or worsening symptoms. Any changes to existing medications or new prescriptions were reviewed with patient and they expressed understanding of how to correctly take their medications and the possible side effects.     PATIENT REFERRED TO:  Carolina Garza

## 2020-07-10 ENCOUNTER — HOSPITAL ENCOUNTER (EMERGENCY)
Age: 40
Discharge: HOME | End: 2020-07-10
Attending: EMERGENCY MEDICINE
Payer: MEDICARE

## 2020-07-10 ENCOUNTER — APPOINTMENT (OUTPATIENT)
Dept: GENERAL RADIOLOGY | Age: 40
End: 2020-07-10
Payer: MEDICARE

## 2020-07-10 VITALS
RESPIRATION RATE: 16 BRPM | HEART RATE: 96 BPM | SYSTOLIC BLOOD PRESSURE: 166 MMHG | TEMPERATURE: 98.4 F | OXYGEN SATURATION: 98 % | DIASTOLIC BLOOD PRESSURE: 99 MMHG

## 2020-07-10 LAB
-: ABNORMAL
ABSOLUTE EOS #: 0.38 K/UL (ref 0–0.44)
ABSOLUTE IMMATURE GRANULOCYTE: 0.04 K/UL (ref 0–0.3)
ABSOLUTE LYMPH #: 4.4 K/UL (ref 1.1–3.7)
ABSOLUTE MONO #: 0.53 K/UL (ref 0.1–1.2)
AMORPHOUS: ABNORMAL
ANION GAP SERPL CALCULATED.3IONS-SCNC: 11 MMOL/L (ref 9–17)
BACTERIA: ABNORMAL
BASOPHILS # BLD: 1 % (ref 0–2)
BASOPHILS ABSOLUTE: 0.06 K/UL (ref 0–0.2)
BILIRUBIN URINE: NEGATIVE
BUN BLDV-MCNC: 8 MG/DL (ref 6–20)
BUN/CREAT BLD: NORMAL (ref 9–20)
CALCIUM SERPL-MCNC: 9.3 MG/DL (ref 8.6–10.4)
CASTS UA: ABNORMAL /LPF (ref 0–2)
CHLORIDE BLD-SCNC: 103 MMOL/L (ref 98–107)
CO2: 25 MMOL/L (ref 20–31)
COLOR: YELLOW
COMMENT UA: ABNORMAL
CREAT SERPL-MCNC: 0.58 MG/DL (ref 0.5–0.9)
CRYSTALS, UA: ABNORMAL /HPF
DIFFERENTIAL TYPE: ABNORMAL
EOSINOPHILS RELATIVE PERCENT: 3 % (ref 1–4)
EPITHELIAL CELLS UA: ABNORMAL /HPF (ref 0–5)
GFR AFRICAN AMERICAN: >60 ML/MIN
GFR NON-AFRICAN AMERICAN: >60 ML/MIN
GFR SERPL CREATININE-BSD FRML MDRD: NORMAL ML/MIN/{1.73_M2}
GFR SERPL CREATININE-BSD FRML MDRD: NORMAL ML/MIN/{1.73_M2}
GLUCOSE BLD-MCNC: 87 MG/DL (ref 70–99)
GLUCOSE URINE: NEGATIVE
HCT VFR BLD CALC: 38.3 % (ref 36.3–47.1)
HEMOGLOBIN: 13.6 G/DL (ref 11.9–15.1)
IMMATURE GRANULOCYTES: 0 %
KETONES, URINE: NEGATIVE
LEUKOCYTE ESTERASE, URINE: NEGATIVE
LYMPHOCYTES # BLD: 36 % (ref 24–43)
MAGNESIUM: 1.9 MG/DL (ref 1.6–2.6)
MCH RBC QN AUTO: 32.3 PG (ref 25.2–33.5)
MCHC RBC AUTO-ENTMCNC: 35.5 G/DL (ref 28.4–34.8)
MCV RBC AUTO: 91 FL (ref 82.6–102.9)
MONOCYTES # BLD: 4 % (ref 3–12)
MUCUS: ABNORMAL
NITRITE, URINE: NEGATIVE
NRBC AUTOMATED: 0 PER 100 WBC
OTHER OBSERVATIONS UA: ABNORMAL
PDW BLD-RTO: 15.8 % (ref 11.8–14.4)
PH UA: 5.5 (ref 5–8)
PHOSPHORUS: 3.1 MG/DL (ref 2.6–4.5)
PLATELET # BLD: 321 K/UL (ref 138–453)
PLATELET ESTIMATE: ABNORMAL
PMV BLD AUTO: 10.7 FL (ref 8.1–13.5)
POTASSIUM SERPL-SCNC: 3.8 MMOL/L (ref 3.7–5.3)
PROTEIN UA: NEGATIVE
RBC # BLD: 4.21 M/UL (ref 3.95–5.11)
RBC # BLD: ABNORMAL 10*6/UL
RBC UA: ABNORMAL /HPF (ref 0–2)
RENAL EPITHELIAL, UA: ABNORMAL /HPF
SEG NEUTROPHILS: 56 % (ref 36–65)
SEGMENTED NEUTROPHILS ABSOLUTE COUNT: 6.94 K/UL (ref 1.5–8.1)
SODIUM BLD-SCNC: 139 MMOL/L (ref 135–144)
SPECIFIC GRAVITY UA: 1.03 (ref 1–1.03)
TRICHOMONAS: ABNORMAL
TURBIDITY: ABNORMAL
URINE HGB: NEGATIVE
UROBILINOGEN, URINE: NORMAL
WBC # BLD: 12.4 K/UL (ref 3.5–11.3)
WBC # BLD: ABNORMAL 10*3/UL
WBC UA: ABNORMAL /HPF (ref 0–5)
YEAST: ABNORMAL

## 2020-07-10 PROCEDURE — 83735 ASSAY OF MAGNESIUM: CPT

## 2020-07-10 PROCEDURE — 2580000003 HC RX 258: Performed by: GENERAL PRACTICE

## 2020-07-10 PROCEDURE — 96374 THER/PROPH/DIAG INJ IV PUSH: CPT

## 2020-07-10 PROCEDURE — 71045 X-RAY EXAM CHEST 1 VIEW: CPT

## 2020-07-10 PROCEDURE — 93005 ELECTROCARDIOGRAM TRACING: CPT | Performed by: GENERAL PRACTICE

## 2020-07-10 PROCEDURE — 99284 EMERGENCY DEPT VISIT MOD MDM: CPT

## 2020-07-10 PROCEDURE — 81001 URINALYSIS AUTO W/SCOPE: CPT

## 2020-07-10 PROCEDURE — 80048 BASIC METABOLIC PNL TOTAL CA: CPT

## 2020-07-10 PROCEDURE — 6360000002 HC RX W HCPCS: Performed by: GENERAL PRACTICE

## 2020-07-10 PROCEDURE — 85025 COMPLETE CBC W/AUTO DIFF WBC: CPT

## 2020-07-10 PROCEDURE — 84100 ASSAY OF PHOSPHORUS: CPT

## 2020-07-10 RX ORDER — 0.9 % SODIUM CHLORIDE 0.9 %
1000 INTRAVENOUS SOLUTION INTRAVENOUS ONCE
Status: COMPLETED | OUTPATIENT
Start: 2020-07-10 | End: 2020-07-10

## 2020-07-10 RX ORDER — ONDANSETRON 2 MG/ML
4 INJECTION INTRAMUSCULAR; INTRAVENOUS ONCE
Status: COMPLETED | OUTPATIENT
Start: 2020-07-10 | End: 2020-07-10

## 2020-07-10 RX ADMIN — ONDANSETRON 4 MG: 2 INJECTION INTRAMUSCULAR; INTRAVENOUS at 13:53

## 2020-07-10 RX ADMIN — SODIUM CHLORIDE 1000 ML: 9 INJECTION, SOLUTION INTRAVENOUS at 13:52

## 2020-07-10 ASSESSMENT — ENCOUNTER SYMPTOMS
EYES NEGATIVE: 1
NAUSEA: 1
RESPIRATORY NEGATIVE: 1
VOMITING: 1

## 2020-07-10 NOTE — ED NOTES
Pt to ED with c/o dizziness since yesterday  Pt states she just does not feel  Like herself  Denies any LOC, denies CP/SOB, and pain  Pt states she has not taken her BP medications in weeks and thinks this could be why she is feeling not herself  Pt alert and oriented x4, resp even and unlabored, no acute distress, will cont to monitor        Meme Hale RN  07/10/20 7775

## 2020-07-10 NOTE — ED PROVIDER NOTES
Male   Lifestyle    Physical activity     Days per week: Not on file     Minutes per session: Not on file    Stress: Not on file   Relationships    Social connections     Talks on phone: Not on file     Gets together: Not on file     Attends Restorationist service: Not on file     Active member of club or organization: Not on file     Attends meetings of clubs or organizations: Not on file     Relationship status: Not on file    Intimate partner violence     Fear of current or ex partner: Not on file     Emotionally abused: Not on file     Physically abused: Not on file     Forced sexual activity: Not on file   Other Topics Concern    Not on file   Social History Narrative    Not on file       Family History   Problem Relation Age of Onset    High Blood Pressure Mother     High Blood Pressure Maternal Grandmother        Allergies:  Adhesive tape    Home Medications:  Prior to Admission medications    Medication Sig Start Date End Date Taking? Authorizing Provider   warfarin (COUMADIN) 5 MG tablet Take 1 tablet by mouth daily for 14 days Maintain INR 2-3 3/30/20 4/13/20  Urmila Rodriguez MD   Elastic Bandages & Supports (MEDICAL COMPRESSION THIGH HIGH) MISC Place compression stocking on each leg when you wake up. Take the stockings off at night when you go to bed. Apply them as soon as you can in the morning and take them off just before you go to sleep.  3/30/20   Urmila Rodriguez MD   acetaminophen (TYLENOL) 500 MG tablet Take 2 tablets by mouth every 8 hours as needed for Pain 11/30/19 12/10/19  Urmila Rodriguez MD   vitamin B-6 (PYRIDOXINE) 50 MG tablet Take 50 mg by mouth daily    Historical Provider, MD   senna-docusate (Mina Showers) 8.6-50 MG per tablet Take 1 tablet by mouth daily    Historical Provider, MD   losartan (COZAAR) 50 MG tablet Take 50 mg by mouth daily    Historical Provider, MD   acetaminophen (TYLENOL) 500 MG tablet Take 1 tablet by mouth 3 times daily as needed for Pain 12/18/18 Melanie Nichols MD   ibuprofen (ADVIL;MOTRIN) 800 MG tablet Take 1 tablet by mouth every 8 hours as needed for Pain 8/24/17   Ellen Rosales MD       REVIEW OF SYSTEMS    (2-9 systems for level 4, 10 or more for level 5)      Review of Systems   Constitutional: Negative. HENT: Negative. Eyes: Negative. Respiratory: Negative. Cardiovascular: Negative. Gastrointestinal: Positive for nausea and vomiting. Genitourinary: Negative. Musculoskeletal: Negative. Neurological: Positive for dizziness and light-headedness. PHYSICAL EXAM   (up to 7 for level 4, 8 or more for level 5)      INITIAL VITALS:   BP (!) 166/99   Pulse 96   Temp 98.4 °F (36.9 °C)   Resp 16   SpO2 98%     Physical Exam   Gen. Appearance: patient appears well, nondistressed. HEENT: head atraumatic, normocephalic. Pupils equal and reactive to light. Oropharynx clear and moist.  Neck: Supple, normal range of motion. No lymphadenopathy. Pulmonary: Lungs clear to auscultation bilaterally. Equal air entry right left. Cardiovascular:  Heart sounds normal, no murmurs. Peripheral pulses strong, regular, equal.   Abdomen: Soft, nontender, nondistended. Bowel sounds normal. No palpable masses. Neurology: GCS 15. Oriented to person place and time. Normal tone and power in all 4 extremities. No sensory deficits. Cranial nerves II through XII intact. Romberg negative. Heel-to-shin negative. Finger-to-nose negative.    Skin: Warm, dry, well perfused      DIFFERENTIAL  DIAGNOSIS     PLAN (LABS / IMAGING / EKG):  Orders Placed This Encounter   Procedures    XR CHEST PORTABLE    CBC Auto Differential    Basic Metabolic Panel    MAGNESIUM    PHOSPHORUS    URINALYSIS    Microscopic Urinalysis    EKG 12 Lead       MEDICATIONS ORDERED:  Orders Placed This Encounter   Medications    ondansetron (ZOFRAN) injection 4 mg    0.9 % sodium chloride bolus           DIAGNOSTIC RESULTS / EMERGENCY DEPARTMENT COURSE / MDM LABS:  Results for orders placed or performed during the hospital encounter of 07/10/20   CBC Auto Differential   Result Value Ref Range    WBC 12.4 (H) 3.5 - 11.3 k/uL    RBC 4.21 3.95 - 5.11 m/uL    Hemoglobin 13.6 11.9 - 15.1 g/dL    Hematocrit 38.3 36.3 - 47.1 %    MCV 91.0 82.6 - 102.9 fL    MCH 32.3 25.2 - 33.5 pg    MCHC 35.5 (H) 28.4 - 34.8 g/dL    RDW 15.8 (H) 11.8 - 14.4 %    Platelets 258 898 - 304 k/uL    MPV 10.7 8.1 - 13.5 fL    NRBC Automated 0.0 0.0 per 100 WBC    Differential Type NOT REPORTED     Seg Neutrophils 56 36 - 65 %    Lymphocytes 36 24 - 43 %    Monocytes 4 3 - 12 %    Eosinophils % 3 1 - 4 %    Basophils 1 0 - 2 %    Immature Granulocytes 0 0 %    Segs Absolute 6.94 1.50 - 8.10 k/uL    Absolute Lymph # 4.40 (H) 1.10 - 3.70 k/uL    Absolute Mono # 0.53 0.10 - 1.20 k/uL    Absolute Eos # 0.38 0.00 - 0.44 k/uL    Basophils Absolute 0.06 0.00 - 0.20 k/uL    Absolute Immature Granulocyte 0.04 0.00 - 0.30 k/uL    WBC Morphology NOT REPORTED     RBC Morphology ANISOCYTOSIS PRESENT     Platelet Estimate NOT REPORTED    URINALYSIS   Result Value Ref Range    Color, UA YELLOW YELLOW    Turbidity UA CLOUDY (A) CLEAR    Glucose, Ur NEGATIVE NEGATIVE    Bilirubin Urine NEGATIVE NEGATIVE    Ketones, Urine NEGATIVE NEGATIVE    Specific Gravity, UA 1.026 1.005 - 1.030    Urine Hgb NEGATIVE NEGATIVE    pH, UA 5.5 5.0 - 8.0    Protein, UA NEGATIVE NEGATIVE    Urobilinogen, Urine Normal Normal    Nitrite, Urine NEGATIVE NEGATIVE    Leukocyte Esterase, Urine NEGATIVE NEGATIVE    Urinalysis Comments NOT REPORTED        RADIOLOGY:  None    EKG  EKG Interpretation    Interpreted by me    Rhythm: normal sinus   Rate: normal  Axis: normal  Ectopy: none  Conduction: normal  ST Segments: no acute change  T Waves: no acute change  Q Waves: none    Clinical Impression: no acute changes and normal EKG    All EKG's are interpreted by the Emergency Department Physician who either signs or Co-signs this chart in the absence of a cardiologist.    EMERGENCY DEPARTMENT COURSE:  46 y.o. female who presents dizziness vertigo, works in a hot kitchen. Work-up thus far negative, physical exam negative. Pt requesting to leave due to family emergency. Patient was counseled to follow up with their Primary Care Provider as soon as possible for an ER follow-up visit. Patient counseled to return to the Emergency Department for any worsening symptoms, unresolved symptoms, or for any other cares or concerns. Patient counseled on the use of alternating Motrin and Tylenol should they develop a fever at home. Patient verbalized understanding and agreement with plan. Discharged to home in stable condition and in no distress. PROCEDURES:  None    CONSULTS:  None    CRITICAL CARE:  None    FINAL IMPRESSION      1.  Dizziness          DISPOSITION / PLAN     DISPOSITION Decision To Discharge 07/10/2020 02:16:14 PM      PATIENT REFERRED TO:  [facility]  [address] [place] [address]  [phone]    As needed      DISCHARGE MEDICATIONS:  New Prescriptions    No medications on file       Caty Benitez DO  Emergency Medicine Resident    (Please note that portions of thisnote were completed with a voice recognition program.  Efforts were made to edit the dictations but occasionally words are mis-transcribed.)        Caty Benitez DO  Resident  07/10/20 [id number]

## 2020-07-10 NOTE — ED PROVIDER NOTES
[facility]     Emergency Department     Faculty Attestation    I performed a history and physical examination of the patient and discussed management with the resident. I have reviewed and agree with the residents findings including all diagnostic interpretations, and treatment plans as written at the time of my review. Any areas of disagreement are noted on the chart. I was personally present for the key portions of any procedures. I have documented in the chart those procedures where I was not present during the key portions. For Physician Assistant/ Nurse Practitioner cases/documentation I have personally evaluated this patient and have completed at least one if not all key elements of the E/M (history, physical exam, and MDM). Additional findings are as noted. This patient was evaluated in the Emergency Department for symptoms described in the history of present illness. The patient was evaluated in the context of the global COVID-19 pandemic, which necessitated consideration that the patient might be at risk for infection with the SARS-CoV-2 virus that causes COVID-19. Institutional protocols and algorithms that pertain to the evaluation of patients at risk for COVID-19 are in a state of rapid change based on information released by regulatory bodies including the CDC and federal and state organizations. These policies and algorithms were followed during the patient's care in the ED. Primary Care Physician: No primary care provider on file. History: This is a 46 y.o. female who presents to the Emergency Department with complaint of dizzy lightheaded. This began earlier yesterday. The patient states she works in a very hot environment and felt very sweaty. She denies any chest pain or shortness of breath. She denies any numbness, tingling. Physical:   temperature is 98.4 °F (36.9 °C).  Her blood pressure is 166/99 (abnormal) and her pulse is 96. Her respiration is 16 and oxygen saturation is 98%. Lungs are clear to auscultation bilateral, heart regular rate and rhythm, abdomen soft nontender, patient moves all tremors well. Impression: Dizziness, dehydration    Plan: IV fluid, EKG, CBC, BMP    Patient has not received all her IV fluid and all labs have not returned. Says at this time that she must leave as she has a family emergency. Did discuss with the patient that we do not have all her laboratory values back. .  She understands that we do not have referral evaluation. She was told that she may return at any time for further and complete evaluation and IV fluid. EKG Interpretation    Interpreted by me  Normal sinus rhythm ventricular 74, normal MO interval, normal QRS duration, normal axis, possible left atrial lodgment, left ventricular hypertrophy, cannot rule out septal infarct, age undetermined, normal QT corrected, no acute ST or T wave changes noted  Impression: Normal sinus rhythm, possible left atrial lodgment, left ventricular hypertrophy, cannot rule out septal infarct, age undetermined  No old EKG for comparison          (Please note that portions of this note were completed with a voice recognition program.  Efforts were made to edit the dictations but occasionally words are mis-transcribed.)    Elan Gonzalez MD, [facility]  Attending Emergency Medicine Physician        Nj Og MD  07/10/20 [id number]

## 2020-07-11 LAB
EKG ATRIAL RATE: 74 BPM
EKG P AXIS: 60 DEGREES
EKG P-R INTERVAL: 178 MS
EKG Q-T INTERVAL: 384 MS
EKG QRS DURATION: 86 MS
EKG QTC CALCULATION (BAZETT): 426 MS
EKG R AXIS: 53 DEGREES
EKG T AXIS: 26 DEGREES
EKG VENTRICULAR RATE: 74 BPM

## 2020-07-11 PROCEDURE — 93010 ELECTROCARDIOGRAM REPORT: CPT | Performed by: INTERNAL MEDICINE

## 2020-11-23 ENCOUNTER — HOSPITAL ENCOUNTER (EMERGENCY)
Age: 40
Discharge: HOME | End: 2020-11-24
Attending: EMERGENCY MEDICINE
Payer: MEDICARE

## 2020-11-23 ENCOUNTER — APPOINTMENT (OUTPATIENT)
Dept: CT IMAGING | Age: 40
End: 2020-11-23
Payer: MEDICARE

## 2020-11-23 VITALS
TEMPERATURE: 97.6 F | DIASTOLIC BLOOD PRESSURE: 98 MMHG | OXYGEN SATURATION: 100 % | HEART RATE: 96 BPM | SYSTOLIC BLOOD PRESSURE: 143 MMHG | RESPIRATION RATE: 20 BRPM

## 2020-11-23 LAB
ABSOLUTE EOS #: 0.18 K/UL (ref 0–0.44)
ABSOLUTE IMMATURE GRANULOCYTE: 0.03 K/UL (ref 0–0.3)
ABSOLUTE LYMPH #: 4.69 K/UL (ref 1.1–3.7)
ABSOLUTE MONO #: 0.44 K/UL (ref 0.1–1.2)
ANION GAP SERPL CALCULATED.3IONS-SCNC: 16 MMOL/L (ref 9–17)
BASOPHILS # BLD: 1 % (ref 0–2)
BASOPHILS ABSOLUTE: 0.08 K/UL (ref 0–0.2)
BUN BLDV-MCNC: 7 MG/DL (ref 6–20)
BUN/CREAT BLD: ABNORMAL (ref 9–20)
CALCIUM SERPL-MCNC: 8.7 MG/DL (ref 8.6–10.4)
CHLORIDE BLD-SCNC: 101 MMOL/L (ref 98–107)
CO2: 22 MMOL/L (ref 20–31)
CREAT SERPL-MCNC: 0.51 MG/DL (ref 0.5–0.9)
DIFFERENTIAL TYPE: ABNORMAL
EOSINOPHILS RELATIVE PERCENT: 2 % (ref 1–4)
GFR AFRICAN AMERICAN: >60 ML/MIN
GFR NON-AFRICAN AMERICAN: >60 ML/MIN
GFR SERPL CREATININE-BSD FRML MDRD: ABNORMAL ML/MIN/{1.73_M2}
GFR SERPL CREATININE-BSD FRML MDRD: ABNORMAL ML/MIN/{1.73_M2}
GLUCOSE BLD-MCNC: 92 MG/DL (ref 70–99)
HCG QUALITATIVE: NEGATIVE
HCT VFR BLD CALC: 38.2 % (ref 36.3–47.1)
HEMOGLOBIN: 13.7 G/DL (ref 11.9–15.1)
IMMATURE GRANULOCYTES: 0 %
INR BLD: 1
LYMPHOCYTES # BLD: 42 % (ref 24–43)
MCH RBC QN AUTO: 33.7 PG (ref 25.2–33.5)
MCHC RBC AUTO-ENTMCNC: 35.9 G/DL (ref 28.4–34.8)
MCV RBC AUTO: 94.1 FL (ref 82.6–102.9)
MONOCYTES # BLD: 4 % (ref 3–12)
NRBC AUTOMATED: 0 PER 100 WBC
PDW BLD-RTO: 17.2 % (ref 11.8–14.4)
PLATELET # BLD: 258 K/UL (ref 138–453)
PLATELET ESTIMATE: ABNORMAL
PMV BLD AUTO: 10.7 FL (ref 8.1–13.5)
POTASSIUM SERPL-SCNC: 3.2 MMOL/L (ref 3.7–5.3)
PROTHROMBIN TIME: 10 SEC (ref 9–12)
RBC # BLD: 4.06 M/UL (ref 3.95–5.11)
RBC # BLD: ABNORMAL 10*6/UL
SEG NEUTROPHILS: 51 % (ref 36–65)
SEGMENTED NEUTROPHILS ABSOLUTE COUNT: 5.71 K/UL (ref 1.5–8.1)
SODIUM BLD-SCNC: 139 MMOL/L (ref 135–144)
WBC # BLD: 11.1 K/UL (ref 3.5–11.3)
WBC # BLD: ABNORMAL 10*3/UL

## 2020-11-23 PROCEDURE — 96374 THER/PROPH/DIAG INJ IV PUSH: CPT

## 2020-11-23 PROCEDURE — 80048 BASIC METABOLIC PNL TOTAL CA: CPT

## 2020-11-23 PROCEDURE — 85610 PROTHROMBIN TIME: CPT

## 2020-11-23 PROCEDURE — 99283 EMERGENCY DEPT VISIT LOW MDM: CPT

## 2020-11-23 PROCEDURE — 3209999900 CT THORACIC SPINE TRAUMA RECONSTRUCTION

## 2020-11-23 PROCEDURE — 96375 TX/PRO/DX INJ NEW DRUG ADDON: CPT

## 2020-11-23 PROCEDURE — 71260 CT THORAX DX C+: CPT

## 2020-11-23 PROCEDURE — 6360000002 HC RX W HCPCS: Performed by: EMERGENCY MEDICINE

## 2020-11-23 PROCEDURE — 6360000002 HC RX W HCPCS: Performed by: STUDENT IN AN ORGANIZED HEALTH CARE EDUCATION/TRAINING PROGRAM

## 2020-11-23 PROCEDURE — 84703 CHORIONIC GONADOTROPIN ASSAY: CPT

## 2020-11-23 PROCEDURE — 3209999900 CT LUMBAR SPINE TRAUMA RECONSTRUCTION

## 2020-11-23 PROCEDURE — 70450 CT HEAD/BRAIN W/O DYE: CPT

## 2020-11-23 PROCEDURE — 6360000004 HC RX CONTRAST MEDICATION: Performed by: EMERGENCY MEDICINE

## 2020-11-23 PROCEDURE — 85025 COMPLETE CBC W/AUTO DIFF WBC: CPT

## 2020-11-23 PROCEDURE — 72125 CT NECK SPINE W/O DYE: CPT

## 2020-11-23 RX ORDER — FENTANYL CITRATE 50 UG/ML
50 INJECTION, SOLUTION INTRAMUSCULAR; INTRAVENOUS ONCE
Status: COMPLETED | OUTPATIENT
Start: 2020-11-23 | End: 2020-11-23

## 2020-11-23 RX ORDER — LORAZEPAM 2 MG/ML
0.5 INJECTION INTRAMUSCULAR ONCE
Status: COMPLETED | OUTPATIENT
Start: 2020-11-23 | End: 2020-11-23

## 2020-11-23 RX ADMIN — IOPAMIDOL 75 ML: 755 INJECTION, SOLUTION INTRAVENOUS at 22:23

## 2020-11-23 RX ADMIN — LORAZEPAM 0.5 MG: 2 INJECTION INTRAMUSCULAR; INTRAVENOUS at 21:44

## 2020-11-23 RX ADMIN — FENTANYL CITRATE 50 MCG: 50 INJECTION, SOLUTION INTRAMUSCULAR; INTRAVENOUS at 20:54

## 2020-11-23 ASSESSMENT — ENCOUNTER SYMPTOMS
CONSTIPATION: 0
BACK PAIN: 1
SORE THROAT: 0
ABDOMINAL PAIN: 0
CHEST TIGHTNESS: 0
SHORTNESS OF BREATH: 0
SINUS PAIN: 0
TROUBLE SWALLOWING: 0
NAUSEA: 0
COUGH: 0
VOMITING: 0
EYE REDNESS: 0
SINUS PRESSURE: 0
DIARRHEA: 0
COLOR CHANGE: 0
PHOTOPHOBIA: 0

## 2020-11-23 ASSESSMENT — PAIN DESCRIPTION - ORIENTATION: ORIENTATION: LOWER

## 2020-11-23 ASSESSMENT — PAIN DESCRIPTION - DESCRIPTORS: DESCRIPTORS: ACHING

## 2020-11-23 ASSESSMENT — PAIN DESCRIPTION - FREQUENCY: FREQUENCY: CONTINUOUS

## 2020-11-23 ASSESSMENT — PAIN SCALES - GENERAL
PAINLEVEL_OUTOF10: 10
PAINLEVEL_OUTOF10: 10

## 2020-11-23 ASSESSMENT — PAIN DESCRIPTION - PAIN TYPE: TYPE: ACUTE PAIN

## 2020-11-23 ASSESSMENT — PAIN DESCRIPTION - LOCATION: LOCATION: BACK

## 2020-11-24 PROCEDURE — 6360000002 HC RX W HCPCS: Performed by: STUDENT IN AN ORGANIZED HEALTH CARE EDUCATION/TRAINING PROGRAM

## 2020-11-24 RX ORDER — METHOCARBAMOL 500 MG/1
500 TABLET, FILM COATED ORAL 3 TIMES DAILY
Qty: 5 TABLET | Refills: 0 | Status: SHIPPED | OUTPATIENT
Start: 2020-11-24 | End: 2020-11-26

## 2020-11-24 RX ORDER — CYCLOBENZAPRINE HCL 5 MG
5 TABLET ORAL 2 TIMES DAILY PRN
Qty: 10 TABLET | Refills: 0 | Status: SHIPPED | OUTPATIENT
Start: 2020-11-24 | End: 2020-11-24

## 2020-11-24 RX ORDER — KETOROLAC TROMETHAMINE 15 MG/ML
15 INJECTION, SOLUTION INTRAMUSCULAR; INTRAVENOUS ONCE
Status: COMPLETED | OUTPATIENT
Start: 2020-11-24 | End: 2020-11-24

## 2020-11-24 RX ORDER — IBUPROFEN 600 MG/1
600 TABLET ORAL 4 TIMES DAILY PRN
Qty: 120 TABLET | Refills: 0 | Status: ON HOLD | OUTPATIENT
Start: 2020-11-24 | End: 2021-04-19 | Stop reason: HOSPADM

## 2020-11-24 RX ADMIN — KETOROLAC TROMETHAMINE 15 MG: 15 INJECTION, SOLUTION INTRAMUSCULAR; INTRAVENOUS at 00:19

## 2020-11-24 ASSESSMENT — PAIN SCALES - GENERAL: PAINLEVEL_OUTOF10: 10
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
## 2020-11-24 NOTE — ED NOTES
Bed: 48PED  Expected date: 11/23/20  Expected time: 8:13 PM  Means of arrival: [facility] SURGICAL AND CARDIOVASCULAR [facility]  Comments:  Medic Graychristie Romero 06, [address] [facility]  11/23/20 2022
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
## 2021-01-18 ENCOUNTER — APPOINTMENT (OUTPATIENT)
Dept: GENERAL RADIOLOGY | Age: 41
End: 2021-01-18
Payer: MEDICARE

## 2021-01-18 ENCOUNTER — HOSPITAL ENCOUNTER (EMERGENCY)
Age: 41
Discharge: LEFT BEFORE DISCHARGE | End: 2021-01-18
Attending: EMERGENCY MEDICINE | Admitting: INTERNAL MEDICINE
Payer: MEDICARE

## 2021-01-18 VITALS
OXYGEN SATURATION: 99 % | WEIGHT: 160 LBS | TEMPERATURE: 97.2 F | BODY MASS INDEX: 29.26 KG/M2 | RESPIRATION RATE: 20 BRPM | SYSTOLIC BLOOD PRESSURE: 135 MMHG | HEART RATE: 107 BPM | DIASTOLIC BLOOD PRESSURE: 79 MMHG

## 2021-01-18 DIAGNOSIS — I82.4Y3 ACUTE DEEP VEIN THROMBOSIS (DVT) OF PROXIMAL VEIN OF BOTH LOWER EXTREMITIES (HCC): Primary | ICD-10-CM

## 2021-01-18 PROBLEM — I82.413 DVT OF DEEP FEMORAL VEIN, BILATERAL (HCC): Status: ACTIVE | Noted: 2021-01-18

## 2021-01-18 LAB
ABSOLUTE EOS #: 0.17 K/UL (ref 0–0.44)
ABSOLUTE IMMATURE GRANULOCYTE: <0.03 K/UL (ref 0–0.3)
ABSOLUTE LYMPH #: 3.38 K/UL (ref 1.1–3.7)
ABSOLUTE MONO #: 0.5 K/UL (ref 0.1–1.2)
ALBUMIN SERPL-MCNC: 4.1 G/DL (ref 3.5–5.2)
ALBUMIN/GLOBULIN RATIO: 1.2 (ref 1–2.5)
ALP BLD-CCNC: 75 U/L (ref 35–104)
ALT SERPL-CCNC: 21 U/L (ref 5–33)
ANION GAP SERPL CALCULATED.3IONS-SCNC: 12 MMOL/L (ref 9–17)
AST SERPL-CCNC: 26 U/L
BASOPHILS # BLD: 1 % (ref 0–2)
BASOPHILS ABSOLUTE: 0.07 K/UL (ref 0–0.2)
BILIRUB SERPL-MCNC: 0.21 MG/DL (ref 0.3–1.2)
BNP INTERPRETATION: NORMAL
BUN BLDV-MCNC: 6 MG/DL (ref 6–20)
BUN/CREAT BLD: ABNORMAL (ref 9–20)
CALCIUM SERPL-MCNC: 9.1 MG/DL (ref 8.6–10.4)
CHLORIDE BLD-SCNC: 109 MMOL/L (ref 98–107)
CO2: 26 MMOL/L (ref 20–31)
CREAT SERPL-MCNC: 0.44 MG/DL (ref 0.5–0.9)
DIFFERENTIAL TYPE: ABNORMAL
EOSINOPHILS RELATIVE PERCENT: 2 % (ref 1–4)
GFR AFRICAN AMERICAN: >60 ML/MIN
GFR NON-AFRICAN AMERICAN: >60 ML/MIN
GFR SERPL CREATININE-BSD FRML MDRD: ABNORMAL ML/MIN/{1.73_M2}
GFR SERPL CREATININE-BSD FRML MDRD: ABNORMAL ML/MIN/{1.73_M2}
GLUCOSE BLD-MCNC: 109 MG/DL (ref 70–99)
HCG QUALITATIVE: NEGATIVE
HCT VFR BLD CALC: 39.8 % (ref 36.3–47.1)
HEMOGLOBIN: 14.1 G/DL (ref 11.9–15.1)
IMMATURE GRANULOCYTES: 0 %
INR BLD: 0.9
LYMPHOCYTES # BLD: 43 % (ref 24–43)
MCH RBC QN AUTO: 32.7 PG (ref 25.2–33.5)
MCHC RBC AUTO-ENTMCNC: 35.4 G/DL (ref 28.4–34.8)
MCV RBC AUTO: 92.3 FL (ref 82.6–102.9)
MONOCYTES # BLD: 6 % (ref 3–12)
NRBC AUTOMATED: 0 PER 100 WBC
PDW BLD-RTO: 15.2 % (ref 11.8–14.4)
PLATELET # BLD: 289 K/UL (ref 138–453)
PLATELET ESTIMATE: ABNORMAL
PMV BLD AUTO: 11.1 FL (ref 8.1–13.5)
POTASSIUM SERPL-SCNC: 3.4 MMOL/L (ref 3.7–5.3)
PRO-BNP: 33 PG/ML
PROTHROMBIN TIME: 9.7 SEC (ref 9–12)
RBC # BLD: 4.31 M/UL (ref 3.95–5.11)
RBC # BLD: ABNORMAL 10*6/UL
SEG NEUTROPHILS: 48 % (ref 36–65)
SEGMENTED NEUTROPHILS ABSOLUTE COUNT: 3.8 K/UL (ref 1.5–8.1)
SODIUM BLD-SCNC: 147 MMOL/L (ref 135–144)
TOTAL PROTEIN: 7.5 G/DL (ref 6.4–8.3)
TSH SERPL DL<=0.05 MIU/L-ACNC: 0.55 MIU/L (ref 0.3–5)
WBC # BLD: 7.9 K/UL (ref 3.5–11.3)
WBC # BLD: ABNORMAL 10*3/UL

## 2021-01-18 PROCEDURE — 93970 EXTREMITY STUDY: CPT

## 2021-01-18 PROCEDURE — 83880 ASSAY OF NATRIURETIC PEPTIDE: CPT

## 2021-01-18 PROCEDURE — 80053 COMPREHEN METABOLIC PANEL: CPT

## 2021-01-18 PROCEDURE — 96372 THER/PROPH/DIAG INJ SC/IM: CPT

## 2021-01-18 PROCEDURE — 85610 PROTHROMBIN TIME: CPT

## 2021-01-18 PROCEDURE — 96375 TX/PRO/DX INJ NEW DRUG ADDON: CPT

## 2021-01-18 PROCEDURE — 6370000000 HC RX 637 (ALT 250 FOR IP): Performed by: STUDENT IN AN ORGANIZED HEALTH CARE EDUCATION/TRAINING PROGRAM

## 2021-01-18 PROCEDURE — 93005 ELECTROCARDIOGRAM TRACING: CPT | Performed by: STUDENT IN AN ORGANIZED HEALTH CARE EDUCATION/TRAINING PROGRAM

## 2021-01-18 PROCEDURE — 6360000002 HC RX W HCPCS: Performed by: STUDENT IN AN ORGANIZED HEALTH CARE EDUCATION/TRAINING PROGRAM

## 2021-01-18 PROCEDURE — 99285 EMERGENCY DEPT VISIT HI MDM: CPT

## 2021-01-18 PROCEDURE — 71046 X-RAY EXAM CHEST 2 VIEWS: CPT

## 2021-01-18 PROCEDURE — 84703 CHORIONIC GONADOTROPIN ASSAY: CPT

## 2021-01-18 PROCEDURE — 85025 COMPLETE CBC W/AUTO DIFF WBC: CPT

## 2021-01-18 PROCEDURE — 96374 THER/PROPH/DIAG INJ IV PUSH: CPT

## 2021-01-18 PROCEDURE — 84443 ASSAY THYROID STIM HORMONE: CPT

## 2021-01-18 RX ORDER — ONDANSETRON 2 MG/ML
4 INJECTION INTRAMUSCULAR; INTRAVENOUS EVERY 8 HOURS PRN
Status: DISCONTINUED | OUTPATIENT
Start: 2021-01-18 | End: 2021-01-18 | Stop reason: HOSPADM

## 2021-01-18 RX ORDER — SODIUM CHLORIDE 450 MG/100ML
INJECTION, SOLUTION INTRAVENOUS CONTINUOUS
Status: DISCONTINUED | OUTPATIENT
Start: 2021-01-18 | End: 2021-01-18 | Stop reason: HOSPADM

## 2021-01-18 RX ORDER — OXYCODONE HYDROCHLORIDE 5 MG/1
5 TABLET ORAL EVERY 6 HOURS PRN
Status: DISCONTINUED | OUTPATIENT
Start: 2021-01-18 | End: 2021-01-18 | Stop reason: HOSPADM

## 2021-01-18 RX ORDER — KETOROLAC TROMETHAMINE 15 MG/ML
15 INJECTION, SOLUTION INTRAMUSCULAR; INTRAVENOUS ONCE
Status: COMPLETED | OUTPATIENT
Start: 2021-01-18 | End: 2021-01-18

## 2021-01-18 RX ORDER — SODIUM CHLORIDE 0.9 % (FLUSH) 0.9 %
10 SYRINGE (ML) INJECTION PRN
Status: DISCONTINUED | OUTPATIENT
Start: 2021-01-18 | End: 2021-01-18 | Stop reason: HOSPADM

## 2021-01-18 RX ORDER — MORPHINE SULFATE 4 MG/ML
4 INJECTION, SOLUTION INTRAMUSCULAR; INTRAVENOUS ONCE
Status: COMPLETED | OUTPATIENT
Start: 2021-01-18 | End: 2021-01-18

## 2021-01-18 RX ORDER — SODIUM CHLORIDE 0.9 % (FLUSH) 0.9 %
10 SYRINGE (ML) INJECTION EVERY 12 HOURS SCHEDULED
Status: DISCONTINUED | OUTPATIENT
Start: 2021-01-18 | End: 2021-01-18 | Stop reason: HOSPADM

## 2021-01-18 RX ORDER — ACETAMINOPHEN 325 MG/1
650 TABLET ORAL EVERY 4 HOURS PRN
Status: DISCONTINUED | OUTPATIENT
Start: 2021-01-18 | End: 2021-01-18 | Stop reason: HOSPADM

## 2021-01-18 RX ORDER — ORPHENADRINE CITRATE 30 MG/ML
60 INJECTION INTRAMUSCULAR; INTRAVENOUS ONCE
Status: COMPLETED | OUTPATIENT
Start: 2021-01-18 | End: 2021-01-18

## 2021-01-18 RX ORDER — ACETAMINOPHEN 325 MG/1
650 TABLET ORAL ONCE
Status: COMPLETED | OUTPATIENT
Start: 2021-01-18 | End: 2021-01-18

## 2021-01-18 RX ADMIN — KETOROLAC TROMETHAMINE 15 MG: 15 INJECTION, SOLUTION INTRAMUSCULAR; INTRAVENOUS at 08:27

## 2021-01-18 RX ADMIN — ENOXAPARIN SODIUM 70 MG: 80 INJECTION SUBCUTANEOUS at 11:15

## 2021-01-18 RX ADMIN — ORPHENADRINE CITRATE 60 MG: 60 INJECTION INTRAMUSCULAR; INTRAVENOUS at 11:15

## 2021-01-18 RX ADMIN — MORPHINE SULFATE 4 MG: 4 INJECTION INTRAVENOUS at 11:15

## 2021-01-18 RX ADMIN — ACETAMINOPHEN 650 MG: 325 TABLET ORAL at 08:27

## 2021-01-18 ASSESSMENT — ENCOUNTER SYMPTOMS
COUGH: 0
SORE THROAT: 0
EYE ITCHING: 0
NAUSEA: 0
VOMITING: 0
RHINORRHEA: 0
SHORTNESS OF BREATH: 1
EYE REDNESS: 0

## 2021-01-18 ASSESSMENT — PAIN DESCRIPTION - LOCATION: LOCATION: LEG

## 2021-01-18 ASSESSMENT — PAIN SCALES - GENERAL: PAINLEVEL_OUTOF10: 10

## 2021-01-18 ASSESSMENT — PAIN DESCRIPTION - PAIN TYPE: TYPE: ACUTE PAIN

## 2021-01-18 ASSESSMENT — PAIN DESCRIPTION - DESCRIPTORS: DESCRIPTORS: TIGHTNESS;PRESSURE

## 2021-01-18 NOTE — ED NOTES
Pt. Placed on continuous NIBP and pulse ox on monitor   Pt.  Removed self from both after vitals were complete     Debbie Fleming RN  01/18/21 2108

## 2021-01-18 NOTE — ED NOTES
Pt. Returned from vascular lab.  Pt. Requesting pain meds  Per vascular tech, pt has bilateral DVTs in her legs; Dr. Marily Riedel aware     Ganesh Alexander RN  01/18/21 6134 Osorio Chery(NP)

## 2021-01-18 NOTE — ED NOTES
Pt. To ER room 26 via wheelchair from triage  Pt. Presents with bilateral lower extremity pain; pt states she had blood clots during her pregnancy and she does not have medical insurance and was never able to take blood thinners. Pt. States her mother  from a pulmonary embolism and she is fearful that she will end up with the same problem. Pt. Legs have trace edema, d/p, p/t pulses are 1+ and regular. Pt. States her toes feel numb on both feet; pt has brisk capillary refill. Pt. Denies shortness of breath and chest pain. Pt. Has extremely labile behavior during triage exam and assessment. Pt. Extremely tearful briefly, then will become calm. Pt. Biju Floyd on vitals assessment; Dr. Rachael Castaneda aware. Vitals otherwise stable. Pt. Placed on full cardiac monitor, call light given. Awaiting further orders.  Will continue to monitor and reassess      Gris Ladd RN  21 [id number]

## 2021-01-18 NOTE — Clinical Note
Patient Class: Inpatient [101]   REQUIRED: [facility]: [facility] [[id number]]   Estimated Length of Stay: Estimated stay of more than 2 midnights   Telemetry Bed Required?: Yes

## 2021-01-18 NOTE — ED NOTES
Pt. Dionna Solis on stretcher, NAD, RR even and unlabored  Call light within reach  Will continue to monitor and reassess     Honorio Borja RN  01/18/21 9610

## 2021-01-18 NOTE — ED PROVIDER NOTES
[facility]     Emergency Department     Faculty Attestation    I performed a history and physical examination of the patient and discussed management with the resident. I reviewed the residents note and agree with the documented findings and plan of care. Any areas of disagreement are noted on the chart. I was personally present for the key portions of any procedures. I have documented in the chart those procedures where I was not present during the key portions. I have reviewed the emergency nurses triage note. I agree with the chief complaint, past medical history, past surgical history, allergies, medications, social and family history as documented unless otherwise noted below. For Physician Assistant/ Nurse Practitioner cases/documentation I have personally evaluated this patient and have completed at least one if not all key elements of the E/M (history, physical exam, and MDM). Additional findings are as noted. Primary Care Physician:  No primary care provider on file.     CHIEF COMPLAINT       Chief Complaint   Patient presents with    Leg Swelling     bilat, hx DVTs, bilat d/p pulses       RECENT VITALS:   Temp: 97.2 °F (36.2 °C),  Pulse: 107, Resp: 20, BP: 136/79    LABS:  Labs Reviewed   CBC WITH AUTO DIFFERENTIAL   COMPREHENSIVE METABOLIC PANEL   TSH WITH REFLEX   HCG, SERUM, QUALITATIVE   URINALYSIS WITH MICROSCOPIC   BRAIN NATRIURETIC PEPTIDE   PROTIME-INR     EKG shows sinus tachycardia rate of 107 bpm VA was 184 ms QS durations 82 ms QT corrected 440 ms axis is 70 no acute ST or T wave changes seen  PERTINENT ATTENDING PHYSICIAN COMMENTS:  Patient is here complaining of bilateral lower extremity swelling patient does have a history of DVTs she is very tearful and upset she has sats of 100% on room air we will do a cardiac work-up scan her legs also do a tox screen    Critical Care          Tiffany Maier MD, [facility]  Attending Emergency  Physician Nestor Stark MD  01/18/21 4409
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
## 2021-01-18 NOTE — ED NOTES
Pt. Calls out for pain medication again  Dr. Marily Riedel notified     Ganesh Alexander RN  01/18/21 3738

## 2021-01-18 NOTE — PROGRESS NOTES
Was paged by RN that patient wanted to leave AMA and was very rude with nurses because hse was NPO. I assessed patient bed side. She was crying when I reached and said she anxious. I explained in detail about extensive blood clots in both her legs and possible pulmonary embolism as well given her chest pain. Discussed with her the importance of staying in the hospital and getting appropriate anti-coagulation. Also explained the risk of death if she leaves AMA. She agreed to the plan. She was re assured that she will be taken care well. She expressed that her legs were aching badly. Discussed pain medications and prescribed them  Also, discussed she will be able to eat as no procedure is planned. She agreed. Discussed with RN the same. Artemio Palma MD  PGY-3 Internal Medicine Resident  [place], [place]  1/18/2021 2:44 PM      Paged from RN that patient left AMA at 3.15 PM  Accepted for admission from ER but patient left the ER before admission and complete assessment by the team and attending physician.

## 2021-01-18 NOTE — ED NOTES
Pt. Hieu Schwartz on stretcher at this time, NAD, RR even and unlabored  Call light in reach  Will continue to monitor and reassess     Lisa Garcia RN  01/18/21 1328

## 2021-01-18 NOTE — ED NOTES
Pt. Calls out multiple times requesting pain medication; pt extremely tearful      Aleyda Soliman, RN  01/18/21 [id number]

## 2021-01-18 NOTE — ED NOTES
Pt. Calls out; requesting to leave due to NPO status. Pt. Becomes verbally abusive with writer, states \"if y'all are gonna sit here and let me starve, I'm just [place] go and you need to give me my damn meds first.\"   Resident perfect served.   Awaiting orders     Bert Laws RN  01/18/21 1478

## 2021-01-19 LAB
EKG ATRIAL RATE: 107 BPM
EKG P AXIS: 68 DEGREES
EKG P-R INTERVAL: 184 MS
EKG Q-T INTERVAL: 330 MS
EKG QRS DURATION: 82 MS
EKG QTC CALCULATION (BAZETT): 440 MS
EKG R AXIS: 70 DEGREES
EKG T AXIS: 34 DEGREES
EKG VENTRICULAR RATE: 107 BPM

## 2021-04-16 ENCOUNTER — HOSPITAL ENCOUNTER (INPATIENT)
Age: 41
LOS: 2 days | Discharge: HOME | DRG: 134 | End: 2021-04-19
Attending: EMERGENCY MEDICINE | Admitting: INTERNAL MEDICINE
Payer: MEDICARE

## 2021-04-16 ENCOUNTER — APPOINTMENT (OUTPATIENT)
Dept: GENERAL RADIOLOGY | Age: 41
DRG: 134 | End: 2021-04-16
Payer: MEDICARE

## 2021-04-16 DIAGNOSIS — I26.93 SINGLE SUBSEGMENTAL PULMONARY EMBOLISM WITHOUT ACUTE COR PULMONALE (HCC): Primary | ICD-10-CM

## 2021-04-16 DIAGNOSIS — R07.1 CHEST PAIN ON BREATHING: ICD-10-CM

## 2021-04-16 LAB
ABSOLUTE EOS #: 0.11 K/UL (ref 0–0.44)
ABSOLUTE IMMATURE GRANULOCYTE: 0.06 K/UL (ref 0–0.3)
ABSOLUTE LYMPH #: 2.4 K/UL (ref 1.1–3.7)
ABSOLUTE MONO #: 0.7 K/UL (ref 0.1–1.2)
ANION GAP SERPL CALCULATED.3IONS-SCNC: 18 MMOL/L (ref 9–17)
BASOPHILS # BLD: 0 % (ref 0–2)
BASOPHILS ABSOLUTE: 0.04 K/UL (ref 0–0.2)
BUN BLDV-MCNC: 5 MG/DL (ref 6–20)
BUN/CREAT BLD: ABNORMAL (ref 9–20)
CALCIUM SERPL-MCNC: 10.1 MG/DL (ref 8.6–10.4)
CHLORIDE BLD-SCNC: 99 MMOL/L (ref 98–107)
CO2: 20 MMOL/L (ref 20–31)
CREAT SERPL-MCNC: 0.45 MG/DL (ref 0.5–0.9)
D-DIMER QUANTITATIVE: 4.88 MG/L FEU
DIFFERENTIAL TYPE: ABNORMAL
EOSINOPHILS RELATIVE PERCENT: 1 % (ref 1–4)
GFR AFRICAN AMERICAN: >60 ML/MIN
GFR NON-AFRICAN AMERICAN: >60 ML/MIN
GFR SERPL CREATININE-BSD FRML MDRD: ABNORMAL ML/MIN/{1.73_M2}
GFR SERPL CREATININE-BSD FRML MDRD: ABNORMAL ML/MIN/{1.73_M2}
GLUCOSE BLD-MCNC: 106 MG/DL (ref 70–99)
HCG QUALITATIVE: NEGATIVE
HCT VFR BLD CALC: 40.3 % (ref 36.3–47.1)
HEMOGLOBIN: 14.5 G/DL (ref 11.9–15.1)
IMMATURE GRANULOCYTES: 1 %
LYMPHOCYTES # BLD: 20 % (ref 24–43)
MCH RBC QN AUTO: 33.2 PG (ref 25.2–33.5)
MCHC RBC AUTO-ENTMCNC: 36 G/DL (ref 28.4–34.8)
MCV RBC AUTO: 92.2 FL (ref 82.6–102.9)
MONOCYTES # BLD: 6 % (ref 3–12)
NRBC AUTOMATED: 0 PER 100 WBC
PDW BLD-RTO: 17 % (ref 11.8–14.4)
PLATELET # BLD: 218 K/UL (ref 138–453)
PLATELET ESTIMATE: ABNORMAL
PMV BLD AUTO: 11.6 FL (ref 8.1–13.5)
POTASSIUM SERPL-SCNC: 3.4 MMOL/L (ref 3.7–5.3)
RBC # BLD: 4.37 M/UL (ref 3.95–5.11)
RBC # BLD: ABNORMAL 10*6/UL
SEG NEUTROPHILS: 72 % (ref 36–65)
SEGMENTED NEUTROPHILS ABSOLUTE COUNT: 9.02 K/UL (ref 1.5–8.1)
SODIUM BLD-SCNC: 137 MMOL/L (ref 135–144)
TROPONIN INTERP: NORMAL
TROPONIN T: NORMAL NG/ML
TROPONIN, HIGH SENSITIVITY: <6 NG/L (ref 0–14)
WBC # BLD: 12.3 K/UL (ref 3.5–11.3)
WBC # BLD: ABNORMAL 10*3/UL

## 2021-04-16 PROCEDURE — 96375 TX/PRO/DX INJ NEW DRUG ADDON: CPT

## 2021-04-16 PROCEDURE — 99285 EMERGENCY DEPT VISIT HI MDM: CPT

## 2021-04-16 PROCEDURE — 71045 X-RAY EXAM CHEST 1 VIEW: CPT

## 2021-04-16 PROCEDURE — 84484 ASSAY OF TROPONIN QUANT: CPT

## 2021-04-16 PROCEDURE — 96374 THER/PROPH/DIAG INJ IV PUSH: CPT

## 2021-04-16 PROCEDURE — 6360000002 HC RX W HCPCS: Performed by: STUDENT IN AN ORGANIZED HEALTH CARE EDUCATION/TRAINING PROGRAM

## 2021-04-16 PROCEDURE — 84703 CHORIONIC GONADOTROPIN ASSAY: CPT

## 2021-04-16 PROCEDURE — 83735 ASSAY OF MAGNESIUM: CPT

## 2021-04-16 PROCEDURE — 85025 COMPLETE CBC W/AUTO DIFF WBC: CPT

## 2021-04-16 PROCEDURE — 93005 ELECTROCARDIOGRAM TRACING: CPT | Performed by: STUDENT IN AN ORGANIZED HEALTH CARE EDUCATION/TRAINING PROGRAM

## 2021-04-16 PROCEDURE — 96372 THER/PROPH/DIAG INJ SC/IM: CPT

## 2021-04-16 PROCEDURE — 80048 BASIC METABOLIC PNL TOTAL CA: CPT

## 2021-04-16 PROCEDURE — 96376 TX/PRO/DX INJ SAME DRUG ADON: CPT

## 2021-04-16 PROCEDURE — 85379 FIBRIN DEGRADATION QUANT: CPT

## 2021-04-16 RX ORDER — ONDANSETRON 2 MG/ML
4 INJECTION INTRAMUSCULAR; INTRAVENOUS ONCE
Status: DISCONTINUED | OUTPATIENT
Start: 2021-04-16 | End: 2021-04-16

## 2021-04-16 RX ORDER — KETOROLAC TROMETHAMINE 15 MG/ML
15 INJECTION, SOLUTION INTRAMUSCULAR; INTRAVENOUS ONCE
Status: COMPLETED | OUTPATIENT
Start: 2021-04-16 | End: 2021-04-16

## 2021-04-16 RX ORDER — ACETAMINOPHEN 500 MG
1000 TABLET ORAL ONCE
Status: COMPLETED | OUTPATIENT
Start: 2021-04-16 | End: 2021-04-17

## 2021-04-16 RX ADMIN — KETOROLAC TROMETHAMINE 15 MG: 15 INJECTION, SOLUTION INTRAMUSCULAR; INTRAVENOUS at 23:35

## 2021-04-16 ASSESSMENT — PAIN DESCRIPTION - LOCATION: LOCATION: BACK

## 2021-04-16 ASSESSMENT — ENCOUNTER SYMPTOMS
VOMITING: 0
COUGH: 0
RHINORRHEA: 0
BACK PAIN: 1
DIARRHEA: 0
ABDOMINAL PAIN: 0
WHEEZING: 0
NAUSEA: 0
SHORTNESS OF BREATH: 1

## 2021-04-16 ASSESSMENT — PAIN DESCRIPTION - ORIENTATION: ORIENTATION: RIGHT;UPPER

## 2021-04-16 ASSESSMENT — PAIN DESCRIPTION - FREQUENCY: FREQUENCY: INTERMITTENT

## 2021-04-16 ASSESSMENT — PAIN SCALES - GENERAL: PAINLEVEL_OUTOF10: 10

## 2021-04-16 ASSESSMENT — PAIN DESCRIPTION - DESCRIPTORS: DESCRIPTORS: ACHING;SHARP;TIGHTNESS

## 2021-04-16 NOTE — LETTER
[facility]  [address] [facility]  Phone: [phone]          April 19, 2021     Patient: Lynda Sarabia   YOB: 1980   Date of Visit: 4/16/2021       To Whom It May Concern: It is my medical opinion that Holland Boast may return to work on 4/26/2021. She was admitted to [facility] from 4/16/2021 - 4/19/2021. Please excuse her from any work she missed during that time period. Thank you. If you have any questions or concerns, please don't hesitate to call.     Sincerely,              Osbaldo Ibrahim, [address] [facility]

## 2021-04-17 ENCOUNTER — APPOINTMENT (OUTPATIENT)
Dept: CT IMAGING | Age: 41
DRG: 134 | End: 2021-04-17
Payer: MEDICARE

## 2021-04-17 PROBLEM — I26.99 RIGHT PULMONARY EMBOLUS (HCC): Status: ACTIVE | Noted: 2021-04-17

## 2021-04-17 PROBLEM — F41.9 ANXIETY: Status: ACTIVE | Noted: 2021-04-17

## 2021-04-17 PROBLEM — R07.1 CHEST PAIN ON BREATHING: Status: ACTIVE | Noted: 2021-04-17

## 2021-04-17 PROBLEM — Z91.148 NON COMPLIANCE W MEDICATION REGIMEN: Status: ACTIVE | Noted: 2021-04-17

## 2021-04-17 PROBLEM — J43.8 OTHER EMPHYSEMA (HCC): Status: ACTIVE | Noted: 2021-04-17

## 2021-04-17 PROBLEM — Z91.14 NON COMPLIANCE W MEDICATION REGIMEN: Status: ACTIVE | Noted: 2021-04-17

## 2021-04-17 LAB
EKG ATRIAL RATE: 100 BPM
EKG P AXIS: 67 DEGREES
EKG P-R INTERVAL: 162 MS
EKG Q-T INTERVAL: 332 MS
EKG QRS DURATION: 68 MS
EKG QTC CALCULATION (BAZETT): 428 MS
EKG R AXIS: 50 DEGREES
EKG T AXIS: 38 DEGREES
EKG VENTRICULAR RATE: 100 BPM
INR BLD: 0.9
MAGNESIUM: 1.6 MG/DL (ref 1.6–2.6)
PARTIAL THROMBOPLASTIN TIME: 18.2 SEC (ref 20.5–30.5)
PROTHROMBIN TIME: 10.2 SEC (ref 9.1–12.3)

## 2021-04-17 PROCEDURE — 6360000002 HC RX W HCPCS: Performed by: INTERNAL MEDICINE

## 2021-04-17 PROCEDURE — 6360000004 HC RX CONTRAST MEDICATION: Performed by: STUDENT IN AN ORGANIZED HEALTH CARE EDUCATION/TRAINING PROGRAM

## 2021-04-17 PROCEDURE — 2060000000 HC ICU INTERMEDIATE R&B

## 2021-04-17 PROCEDURE — 2700000000 HC OXYGEN THERAPY PER DAY

## 2021-04-17 PROCEDURE — 6360000002 HC RX W HCPCS

## 2021-04-17 PROCEDURE — 2580000003 HC RX 258: Performed by: NURSE PRACTITIONER

## 2021-04-17 PROCEDURE — 6370000000 HC RX 637 (ALT 250 FOR IP): Performed by: STUDENT IN AN ORGANIZED HEALTH CARE EDUCATION/TRAINING PROGRAM

## 2021-04-17 PROCEDURE — 93010 ELECTROCARDIOGRAM REPORT: CPT | Performed by: INTERNAL MEDICINE

## 2021-04-17 PROCEDURE — 85730 THROMBOPLASTIN TIME PARTIAL: CPT

## 2021-04-17 PROCEDURE — 6360000002 HC RX W HCPCS: Performed by: NURSE PRACTITIONER

## 2021-04-17 PROCEDURE — 93005 ELECTROCARDIOGRAM TRACING: CPT | Performed by: INTERNAL MEDICINE

## 2021-04-17 PROCEDURE — 99255 IP/OBS CONSLTJ NEW/EST HI 80: CPT | Performed by: INTERNAL MEDICINE

## 2021-04-17 PROCEDURE — 99223 1ST HOSP IP/OBS HIGH 75: CPT | Performed by: INTERNAL MEDICINE

## 2021-04-17 PROCEDURE — 6360000002 HC RX W HCPCS: Performed by: STUDENT IN AN ORGANIZED HEALTH CARE EDUCATION/TRAINING PROGRAM

## 2021-04-17 PROCEDURE — 6370000000 HC RX 637 (ALT 250 FOR IP): Performed by: NURSE PRACTITIONER

## 2021-04-17 PROCEDURE — 6370000000 HC RX 637 (ALT 250 FOR IP): Performed by: INTERNAL MEDICINE

## 2021-04-17 PROCEDURE — 2580000003 HC RX 258: Performed by: INTERNAL MEDICINE

## 2021-04-17 PROCEDURE — 85610 PROTHROMBIN TIME: CPT

## 2021-04-17 RX ORDER — SODIUM CHLORIDE 0.9 % (FLUSH) 0.9 %
5-40 SYRINGE (ML) INJECTION EVERY 12 HOURS SCHEDULED
Status: DISCONTINUED | OUTPATIENT
Start: 2021-04-17 | End: 2021-04-19 | Stop reason: HOSPADM

## 2021-04-17 RX ORDER — PANTOPRAZOLE SODIUM 40 MG/1
40 TABLET, DELAYED RELEASE ORAL
Status: DISCONTINUED | OUTPATIENT
Start: 2021-04-18 | End: 2021-04-19 | Stop reason: HOSPADM

## 2021-04-17 RX ORDER — LEVOFLOXACIN 500 MG/1
500 TABLET, FILM COATED ORAL DAILY
Status: DISCONTINUED | OUTPATIENT
Start: 2021-04-17 | End: 2021-04-19 | Stop reason: HOSPADM

## 2021-04-17 RX ORDER — ACETAMINOPHEN 650 MG/1
650 SUPPOSITORY RECTAL EVERY 6 HOURS PRN
Status: DISCONTINUED | OUTPATIENT
Start: 2021-04-17 | End: 2021-04-19 | Stop reason: HOSPADM

## 2021-04-17 RX ORDER — OXYCODONE HYDROCHLORIDE 5 MG/1
5 TABLET ORAL ONCE
Status: COMPLETED | OUTPATIENT
Start: 2021-04-17 | End: 2021-04-17

## 2021-04-17 RX ORDER — MORPHINE SULFATE 2 MG/ML
2 INJECTION, SOLUTION INTRAMUSCULAR; INTRAVENOUS EVERY 4 HOURS PRN
Status: DISCONTINUED | OUTPATIENT
Start: 2021-04-17 | End: 2021-04-19 | Stop reason: HOSPADM

## 2021-04-17 RX ORDER — NICOTINE 21 MG/24HR
1 PATCH, TRANSDERMAL 24 HOURS TRANSDERMAL DAILY PRN
Status: DISCONTINUED | OUTPATIENT
Start: 2021-04-17 | End: 2021-04-19 | Stop reason: HOSPADM

## 2021-04-17 RX ORDER — MORPHINE SULFATE 4 MG/ML
4 INJECTION, SOLUTION INTRAMUSCULAR; INTRAVENOUS ONCE
Status: COMPLETED | OUTPATIENT
Start: 2021-04-17 | End: 2021-04-17

## 2021-04-17 RX ORDER — MORPHINE SULFATE 4 MG/ML
4 INJECTION, SOLUTION INTRAMUSCULAR; INTRAVENOUS EVERY 4 HOURS PRN
Status: DISCONTINUED | OUTPATIENT
Start: 2021-04-17 | End: 2021-04-19 | Stop reason: HOSPADM

## 2021-04-17 RX ORDER — SODIUM CHLORIDE 9 MG/ML
25 INJECTION, SOLUTION INTRAVENOUS PRN
Status: DISCONTINUED | OUTPATIENT
Start: 2021-04-17 | End: 2021-04-19 | Stop reason: HOSPADM

## 2021-04-17 RX ORDER — POTASSIUM CHLORIDE 7.45 MG/ML
10 INJECTION INTRAVENOUS PRN
Status: DISCONTINUED | OUTPATIENT
Start: 2021-04-17 | End: 2021-04-19 | Stop reason: HOSPADM

## 2021-04-17 RX ORDER — LORAZEPAM 0.5 MG/1
0.5 TABLET ORAL EVERY 6 HOURS PRN
Status: DISCONTINUED | OUTPATIENT
Start: 2021-04-17 | End: 2021-04-19 | Stop reason: HOSPADM

## 2021-04-17 RX ORDER — PROMETHAZINE HYDROCHLORIDE 25 MG/ML
25 INJECTION, SOLUTION INTRAMUSCULAR; INTRAVENOUS EVERY 4 HOURS PRN
Status: DISCONTINUED | OUTPATIENT
Start: 2021-04-17 | End: 2021-04-19 | Stop reason: HOSPADM

## 2021-04-17 RX ORDER — ONDANSETRON 2 MG/ML
4 INJECTION INTRAMUSCULAR; INTRAVENOUS EVERY 6 HOURS PRN
Status: DISCONTINUED | OUTPATIENT
Start: 2021-04-17 | End: 2021-04-19 | Stop reason: HOSPADM

## 2021-04-17 RX ORDER — FENTANYL CITRATE 50 UG/ML
INJECTION, SOLUTION INTRAMUSCULAR; INTRAVENOUS
Status: COMPLETED
Start: 2021-04-17 | End: 2021-04-17

## 2021-04-17 RX ORDER — MAGNESIUM SULFATE 1 G/100ML
1000 INJECTION INTRAVENOUS PRN
Status: DISCONTINUED | OUTPATIENT
Start: 2021-04-17 | End: 2021-04-19 | Stop reason: HOSPADM

## 2021-04-17 RX ORDER — SENNA AND DOCUSATE SODIUM 50; 8.6 MG/1; MG/1
2 TABLET, FILM COATED ORAL DAILY
Status: DISCONTINUED | OUTPATIENT
Start: 2021-04-18 | End: 2021-04-19 | Stop reason: HOSPADM

## 2021-04-17 RX ORDER — SODIUM CHLORIDE 0.9 % (FLUSH) 0.9 %
10 SYRINGE (ML) INJECTION PRN
Status: DISCONTINUED | OUTPATIENT
Start: 2021-04-17 | End: 2021-04-19 | Stop reason: HOSPADM

## 2021-04-17 RX ORDER — FENTANYL CITRATE 50 UG/ML
50 INJECTION, SOLUTION INTRAMUSCULAR; INTRAVENOUS ONCE
Status: COMPLETED | OUTPATIENT
Start: 2021-04-17 | End: 2021-04-17

## 2021-04-17 RX ORDER — LANOLIN ALCOHOL/MO/W.PET/CERES
50 CREAM (GRAM) TOPICAL DAILY
Status: DISCONTINUED | OUTPATIENT
Start: 2021-04-17 | End: 2021-04-19 | Stop reason: HOSPADM

## 2021-04-17 RX ORDER — PROMETHAZINE HYDROCHLORIDE 12.5 MG/1
12.5 TABLET ORAL EVERY 6 HOURS PRN
Status: DISCONTINUED | OUTPATIENT
Start: 2021-04-17 | End: 2021-04-17

## 2021-04-17 RX ORDER — OXYCODONE HYDROCHLORIDE AND ACETAMINOPHEN 5; 325 MG/1; MG/1
2 TABLET ORAL EVERY 4 HOURS PRN
Status: DISCONTINUED | OUTPATIENT
Start: 2021-04-17 | End: 2021-04-19 | Stop reason: HOSPADM

## 2021-04-17 RX ORDER — ACETAMINOPHEN 325 MG/1
650 TABLET ORAL EVERY 6 HOURS PRN
Status: DISCONTINUED | OUTPATIENT
Start: 2021-04-17 | End: 2021-04-19 | Stop reason: HOSPADM

## 2021-04-17 RX ORDER — SODIUM CHLORIDE 9 MG/ML
INJECTION, SOLUTION INTRAVENOUS CONTINUOUS
Status: DISCONTINUED | OUTPATIENT
Start: 2021-04-17 | End: 2021-04-18

## 2021-04-17 RX ORDER — OXYCODONE HYDROCHLORIDE 5 MG/1
5 TABLET ORAL EVERY 6 HOURS PRN
Status: DISCONTINUED | OUTPATIENT
Start: 2021-04-17 | End: 2021-04-17

## 2021-04-17 RX ORDER — HYDRALAZINE HYDROCHLORIDE 20 MG/ML
10 INJECTION INTRAMUSCULAR; INTRAVENOUS EVERY 6 HOURS PRN
Status: DISCONTINUED | OUTPATIENT
Start: 2021-04-17 | End: 2021-04-19

## 2021-04-17 RX ORDER — POLYETHYLENE GLYCOL 3350 17 G/17G
17 POWDER, FOR SOLUTION ORAL DAILY PRN
Status: DISCONTINUED | OUTPATIENT
Start: 2021-04-17 | End: 2021-04-19 | Stop reason: HOSPADM

## 2021-04-17 RX ORDER — LOSARTAN POTASSIUM 50 MG/1
50 TABLET ORAL DAILY
Status: DISCONTINUED | OUTPATIENT
Start: 2021-04-17 | End: 2021-04-19 | Stop reason: HOSPADM

## 2021-04-17 RX ORDER — POTASSIUM CHLORIDE 20 MEQ/1
40 TABLET, EXTENDED RELEASE ORAL PRN
Status: DISCONTINUED | OUTPATIENT
Start: 2021-04-17 | End: 2021-04-19 | Stop reason: HOSPADM

## 2021-04-17 RX ORDER — SENNA AND DOCUSATE SODIUM 50; 8.6 MG/1; MG/1
1 TABLET, FILM COATED ORAL DAILY
Status: DISCONTINUED | OUTPATIENT
Start: 2021-04-17 | End: 2021-04-17

## 2021-04-17 RX ORDER — OXYCODONE HYDROCHLORIDE AND ACETAMINOPHEN 5; 325 MG/1; MG/1
1 TABLET ORAL EVERY 4 HOURS PRN
Status: DISCONTINUED | OUTPATIENT
Start: 2021-04-17 | End: 2021-04-19 | Stop reason: HOSPADM

## 2021-04-17 RX ADMIN — OXYCODONE HYDROCHLORIDE 5 MG: 5 TABLET ORAL at 00:03

## 2021-04-17 RX ADMIN — SODIUM CHLORIDE: 9 INJECTION, SOLUTION INTRAVENOUS at 09:29

## 2021-04-17 RX ADMIN — FENTANYL CITRATE 50 MCG: 50 INJECTION, SOLUTION INTRAMUSCULAR; INTRAVENOUS at 02:45

## 2021-04-17 RX ADMIN — ACETAMINOPHEN 1000 MG: 500 TABLET ORAL at 07:26

## 2021-04-17 RX ADMIN — OXYCODONE HYDROCHLORIDE AND ACETAMINOPHEN 2 TABLET: 5; 325 TABLET ORAL at 20:28

## 2021-04-17 RX ADMIN — LEVOFLOXACIN 500 MG: 500 TABLET, FILM COATED ORAL at 20:28

## 2021-04-17 RX ADMIN — SODIUM CHLORIDE, PRESERVATIVE FREE 10 ML: 5 INJECTION INTRAVENOUS at 09:30

## 2021-04-17 RX ADMIN — MORPHINE SULFATE 4 MG: 4 INJECTION, SOLUTION INTRAMUSCULAR; INTRAVENOUS at 21:56

## 2021-04-17 RX ADMIN — LORAZEPAM 0.5 MG: 0.5 TABLET ORAL at 09:34

## 2021-04-17 RX ADMIN — ONDANSETRON 4 MG: 2 INJECTION INTRAMUSCULAR; INTRAVENOUS at 07:29

## 2021-04-17 RX ADMIN — OXYCODONE HYDROCHLORIDE AND ACETAMINOPHEN 2 TABLET: 5; 325 TABLET ORAL at 11:15

## 2021-04-17 RX ADMIN — MORPHINE SULFATE 4 MG: 4 INJECTION INTRAVENOUS at 06:02

## 2021-04-17 RX ADMIN — PROMETHAZINE HYDROCHLORIDE 25 MG: 25 INJECTION INTRAMUSCULAR; INTRAVENOUS at 12:16

## 2021-04-17 RX ADMIN — ENOXAPARIN SODIUM 70 MG: 80 INJECTION SUBCUTANEOUS at 09:35

## 2021-04-17 RX ADMIN — ENOXAPARIN SODIUM 40 MG: 40 INJECTION SUBCUTANEOUS at 00:03

## 2021-04-17 RX ADMIN — Medication 50 MG: at 09:36

## 2021-04-17 RX ADMIN — OXYCODONE HYDROCHLORIDE 5 MG: 5 TABLET ORAL at 08:02

## 2021-04-17 RX ADMIN — ENOXAPARIN SODIUM 70 MG: 80 INJECTION SUBCUTANEOUS at 20:28

## 2021-04-17 RX ADMIN — HYDRALAZINE HYDROCHLORIDE 10 MG: 20 INJECTION INTRAMUSCULAR; INTRAVENOUS at 12:25

## 2021-04-17 RX ADMIN — ACETAMINOPHEN 650 MG: 325 TABLET ORAL at 17:07

## 2021-04-17 RX ADMIN — POTASSIUM BICARBONATE 40 MEQ: 782 TABLET, EFFERVESCENT ORAL at 10:11

## 2021-04-17 RX ADMIN — ENOXAPARIN SODIUM 30 MG: 30 INJECTION SUBCUTANEOUS at 06:19

## 2021-04-17 RX ADMIN — FENTANYL CITRATE 50 MCG: 50 INJECTION, SOLUTION INTRAMUSCULAR; INTRAVENOUS at 00:19

## 2021-04-17 RX ADMIN — LOSARTAN POTASSIUM 50 MG: 50 TABLET, FILM COATED ORAL at 10:11

## 2021-04-17 RX ADMIN — SODIUM CHLORIDE, PRESERVATIVE FREE 10 ML: 5 INJECTION INTRAVENOUS at 20:28

## 2021-04-17 ASSESSMENT — PAIN SCALES - GENERAL
PAINLEVEL_OUTOF10: 3
PAINLEVEL_OUTOF10: 10
PAINLEVEL_OUTOF10: 9
PAINLEVEL_OUTOF10: 10
PAINLEVEL_OUTOF10: 7
PAINLEVEL_OUTOF10: 10
PAINLEVEL_OUTOF10: 10
PAINLEVEL_OUTOF10: 9

## 2021-04-17 NOTE — ED TRIAGE NOTES
Pt reports to the ED via triage with c/o Upper right back pain that woke her up out of her sleep. Hurts upon inspirations. Denies cardiac chest pain, Denies SOB. Pt Denies Fever, cough, chills, N/V/D.   PT has even and unlabored respirations. No signs of trauma or distress noted. PT denies any Cardiac Hx or Respiratory Hx. PT is A&Ox4, able to answer questions. PT presents writer with a \"grouchy\" demeanor. PT had to be asked questions multiple times before she would answer. Wife is very pleasant and accompanying PT.

## 2021-04-17 NOTE — ED NOTES
Pt called out c/o increased chest heaviness, pt placed on 2L NC and  on way to ED for eval     Germain Singh, RN  04/17/21 2083

## 2021-04-17 NOTE — ED PROVIDER NOTES
[facility] ED     Emergency Department     Faculty Attestation        I performed a history and physical examination of the patient and discussed management with the resident. I reviewed the residents note and agree with the documented findings and plan of care. Any areas of disagreement are noted on the chart. I was personally present for the key portions of any procedures. I have documented in the chart those procedures where I was not present during the key portions. I have reviewed the emergency nurses triage note. I agree with the chief complaint, past medical history, past surgical history, allergies, medications, social and family history as documented unless otherwise noted below. For mid-level providers such as nurse practitioners as well as physicians assistants:    I have personally seen and evaluated the patient. I find the patient's history and physical exam are consistent with NP/PA documentation. I agree with the care provided, treatment rendered, disposition, & follow-up plan. Additional findings are as noted. Vital Signs: BP (!) 161/100   Pulse 103   Temp 99.1 °F (37.3 °C) (Oral)   Resp 18   Ht 5' 2\" (1.575 m)   Wt 160 lb (72.6 kg)   SpO2 100%   BMI 29.26 kg/m²   PCP:  No primary care provider on file. Pertinent Comments:     Patient complains of abrupt onset of right-sided pleuritic chest pain. She states she woke up this morning with the symptoms. She does have a history of a DVT supposed to be on Coumadin but has been noncompliant with this. She denies fevers or chills there is no cough or sputum production. She is otherwise hemodynamically stable on exam.  No rash noted to chest wall.   Will obtain the labs, CT imaging of the chest to rule pulmonary embolism, pain control, reassessment      Critical Care  None          Selena Wu MD  Attending Emergency Medicine Physician              Rina Valentine MD 04/16/21 2643

## 2021-04-17 NOTE — ED NOTES
Pt placed on monitor, labs drawn, and EKG obtained. Pt states hx of DVT and not taking medications for it. Significant other at bedside.       Yuval Pepper RN  04/17/21 1603

## 2021-04-17 NOTE — ED NOTES
Pt presents to ED with complaints of back pain and chest pain that started last night. Upon arrival into ED, pt refused to sit in bed so staff could obtain EKG, labs, and monitor until she received pain meds. Writer and Dr Meche Cazares explained to patient that she needed to be placed on monitor, get an EKG, and get an IV before that can be done. Pt was then offered tylenol and pt refused.  Dr Meche Cazares notified     Teagan Munoz RN  04/16/21 [id number]

## 2021-04-17 NOTE — PROGRESS NOTES
**Pt is crying at bedside stating she needs to see a DrUrban and that she can  not breathe and something is wrong. O2 is 98% (however she  keeps pulling off her nasal canula and pulse ox.  (also  keeps pulling at wires and they are coming off), BP was just  taken and she kept messing with it and moving while it was taking  and it read 180/115 (131). She removed it saying it hurt to bad and  would not let me check another BP and something is wrong with  her. ----Made attn aware. **Asked for another BP so doctor can assess---Pt states \"she will have  to wait, im in too much pain\" she also said \"ask her if I can get  some real pain pills\". And she then said \"this is rediculas, its  getting worse. I'm about to discharge myself\". Pt having a lot of  anxiety. Ativan PRN ordered and given. HR elevated ---Pt  educated and attn aware. **Pt let writer take a BP it was 171/109 (116) gave IV PRN Hydralazine. She states she is having CP---EKG completed----Attn aware. **Pt allowed writer to take another BP-155/87 (106)--Will cont. To monito    **Pt c/o nausea---Phenergan ordered and given. **Attn at bedside to see pt and education and assessment completed. Pt c/o pain to attn. Order for Morphine Q4h Placed. **Took temp-101.5--Tylenol given--Temp recheck 100.5--Will cont. To  Assess. Rechecked temp 101.2---made attn aware---Orders to be placed for prophylactic Levaquin, Procalcitonin, and a Urinalysis. **Note from attn. Stating BLE dopplers and an Echo will need to be  completed. (Will wait to place CAM Hose on pt until Dopplers  complete/resulted) Awaiting orders. **K+ 3.4 Replaced with K+ Effer. **Pt resting in room with Wife at bedside.

## 2021-04-17 NOTE — ED PROVIDER NOTES
[facility] ED  Emergency Department Encounter  Emergency Medicine Resident     Pt Name: Merlyn Spurling  MRN: [id number]  [place] 1980  Date of evaluation: 21  PCP:  No primary care provider on file. CHIEF COMPLAINT       Chief Complaint   Patient presents with    Back Pain     Right uypper back pain that wraps around. Hurts more upon inspiration. HISTORY OFPRESENT ILLNESS  (Location/Symptom, Timing/Onset, Context/Setting, Quality, Duration, Modifying Factors,Severity.)      Merlyn Spurling is a 45 y.o. female who presents with right-sided chest pain. Patient states this woke her from her sleep last night and has been intermittent but progressively worsening throughout the day. It began as chest wall pain, but states it is now internal as well. She also feels short of breath and deep breaths worse. It is mostly sharp in nature. Patient has a history of DVTs in the legs for which she was previously on Coumadin for, but has not been taking it. She has not taken this recently. No other medical history. Patient is difficult to obtain history from, but does not admit to any other symptoms at this time. The shortness of breath and intense chest pain are her main complaints. PAST MEDICAL / SURGICAL / SOCIAL / FAMILY HISTORY      has a past medical history of DVT (deep vein thrombosis) in pregnancy and H/O blood clots. has a past surgical history that includes  section (, ) and Tubal ligation ().      Social History     Socioeconomic History    Marital status: Single     Spouse name: Not on file    Number of children: Not on file    Years of education: Not on file    Highest education level: Not on file   Occupational History    Not on file   Social Needs    Financial resource strain: Not on file    Food insecurity     Worry: Not on file     Inability: Not on file    Transportation needs     Medical: Not on file     Non-medical: Not on file   Tobacco Use    Smoking status: Current Every Day Smoker     Packs/day: 0.20     Types: Cigarettes    Smokeless tobacco: Never Used   Substance and Sexual Activity    Alcohol use: Yes     Alcohol/week: 0.0 standard drinks     Comment: socially     Drug use: Yes     Types: Marijuana    Sexual activity: Yes     Partners: Male   Lifestyle    Physical activity     Days per week: Not on file     Minutes per session: Not on file    Stress: Not on file   Relationships    Social connections     Talks on phone: Not on file     Gets together: Not on file     Attends Adventism service: Not on file     Active member of club or organization: Not on file     Attends meetings of clubs or organizations: Not on file     Relationship status: Not on file    Intimate partner violence     Fear of current or ex partner: Not on file     Emotionally abused: Not on file     Physically abused: Not on file     Forced sexual activity: Not on file   Other Topics Concern    Not on file   Social History Narrative    Not on file       Family History   Problem Relation Age of Onset    High Blood Pressure Mother     High Blood Pressure Maternal Grandmother         Allergies:  Adhesive tape    Home Medications:  Prior to Admission medications    Medication Sig Start Date End Date Taking? Authorizing Provider   ibuprofen (ADVIL;MOTRIN) 600 MG tablet Take 1 tablet by mouth 4 times daily as needed for Pain 11/24/20   Miguel Angel Carpenter DO   warfarin (COUMADIN) 5 MG tablet Take 1 tablet by mouth daily for 14 days Maintain INR 2-3 3/30/20 4/13/20  Tameka Blancas MD   Elastic Bandages & Supports (MEDICAL COMPRESSION THIGH HIGH) MISC Place compression stocking on each leg when you wake up. Take the stockings off at night when you go to bed. Apply them as soon as you can in the morning and take them off just before you go to sleep.  3/30/20   Tameka Blancas MD   acetaminophen (TYLENOL) 500 MG tablet Take 2 tablets by mouth every 8 hours as needed for Pain 11/30/19 12/10/19  Demetri Cobian MD   vitamin B-6 (PYRIDOXINE) 50 MG tablet Take 50 mg by mouth daily    Historical Provider, MD rodas (Zannie Force) 8.6-50 MG per tablet Take 1 tablet by mouth daily    Historical Provider, MD   losartan (COZAAR) 50 MG tablet Take 50 mg by mouth daily    Historical Provider, MD   acetaminophen (TYLENOL) 500 MG tablet Take 1 tablet by mouth 3 times daily as needed for Pain 12/18/18   Selvin Talbot MD       REVIEW OFSYSTEMS    (2-9 systems for level 4, 10 or more for level 5)      Review of Systems   Constitutional: Negative for chills and fever. HENT: Negative for congestion and rhinorrhea. Eyes: Negative for visual disturbance. Respiratory: Positive for shortness of breath. Negative for cough and wheezing. Cardiovascular: Positive for chest pain. Negative for leg swelling. Gastrointestinal: Negative for abdominal pain, diarrhea, nausea and vomiting. Genitourinary: Negative for dysuria. Musculoskeletal: Positive for back pain (upper r back). Negative for neck pain. Skin: Negative for rash and wound. Neurological: Negative for dizziness, weakness, light-headedness and headaches. PHYSICAL EXAM   (up to 7 for level 4, 8 or more forlevel 5)      INITIAL VITALS:   ED Triage Vitals [04/16/21 2159]   BP Temp Temp Source Pulse Resp SpO2 Height Weight   (!) 161/100 99.1 °F (37.3 °C) Oral 103 18 100 % 5' 2\" (1.575 m) 160 lb (72.6 kg)       Physical Exam  Vitals signs reviewed. Constitutional:       General: She is in acute distress. Appearance: Normal appearance. She is not ill-appearing, toxic-appearing or diaphoretic. HENT:      Head: Normocephalic and atraumatic. Nose: Nose normal.   Eyes:      Extraocular Movements: Extraocular movements intact. Neck:      Musculoskeletal: Normal range of motion and neck supple. Cardiovascular:      Rate and Rhythm: Regular rhythm. Tachycardia present.       Pulses: Normal tablet 5 mg    fentaNYL (SUBLIMAZE) injection 50 mcg    iopamidol (ISOVUE-370) 76 % injection 75 mL    fentaNYL (SUBLIMAZE) 100 MCG/2ML injection     Joanne May: cabinet override    enoxaparin (LOVENOX) injection 30 mg    losartan (COZAAR) tablet 50 mg    sennosides-docusate sodium (SENOKOT-S) 8.6-50 MG tablet 1 tablet    vitamin B-6 (PYRIDOXINE) tablet 50 mg    sodium chloride flush 0.9 % injection 5-40 mL    sodium chloride flush 0.9 % injection 10 mL    0.9 % sodium chloride infusion    OR Linked Order Group     potassium chloride (KLOR-CON M) extended release tablet 40 mEq     potassium bicarb-citric acid (EFFER-K) effervescent tablet 40 mEq     potassium chloride 10 mEq/100 mL IVPB (Peripheral Line)    magnesium sulfate 1000 mg in dextrose 5% 100 mL IVPB    enoxaparin (LOVENOX) injection 70 mg    OR Linked Order Group     promethazine (PHENERGAN) tablet 12.5 mg     ondansetron (ZOFRAN) injection 4 mg    polyethylene glycol (GLYCOLAX) packet 17 g    nicotine (NICODERM CQ) 21 MG/24HR 1 patch    OR Linked Order Group     acetaminophen (TYLENOL) tablet 650 mg     acetaminophen (TYLENOL) suppository 650 mg    morphine injection 4 mg    fentaNYL (SUBLIMAZE) injection 50 mcg     Initial MDM/Plan/ED COURSE:    45 y.o. female who presents with right-sided pleuritic chest pain. This woke her out of sleep last night. History significant for DVTs in the lower extremities. On exam, patient is acutely distressed and not willing to cooperate with most of the exam or sit in bed due to shortness of breath and pain. She has significant along the right upper back with light touch. No overlying rash or bruising. Lung sounds are clear to auscultation bilaterally. Heart is tachycardic, regular rhythm. We will start work-up with CBC, BMP, troponin, EKG, chest x-ray, D-dimer. Low threshold for CT to rule out PE given patient's history of DVTs and noncompliance with Coumadin.     Given patient's discomfort and history, decision was made to order CT PE rule out. Patient was repeatedly uncooperative with work-up. On initial trip to the [place] [address], patient refused to lay on the bed despite receiving oral and IV analgesics. I spoke with her after this, and stressed the importance of this scan. She was able to tolerate it the second time through. Patient remained stable throughout her stay, but also tachycardic. Oxygen saturation was 97 to 98% during admission in the ED. ED Course as of Apr 17 0747   Fri Apr 16, 2021   2341 IMPRESSION:  Opacification of the inferior 1/2 of the right hemithorax likely represents a combination of middle lobe and right lower lobe consolidation and possible right pleural fluid. Follow-up CT chest recommended. XR CHEST PORTABLE [JS]   0282 D-Dimer, Quant: 4.88 [JS]   3902 hCG Qual: NEGATIVE [JS]   7587 Troponin, High Sensitivity: <6 [JS]   Sat Apr 17, 2021   0446 Spoke with [facility]. Acute PE seen in RLL with consolidation and congestion, moderate clot burden. Mild cardiomegaly, no evidence of RH strain    [JS]      ED Course User Index  [JS] Padmini Morales DO     Patient received total of 1 mg/kg Lovenox subcu.  she was admitted to [facility] for further management of acute PE.    DIAGNOSTIC RESULTS / EMERGENCYDEPARTMENT COURSE / MDM     LABS:  Labs Reviewed   CBC WITH AUTO DIFFERENTIAL - Abnormal; Notable for the following components:       Result Value    WBC 12.3 (*)     MCHC 36.0 (*)     RDW 17.0 (*)     Seg Neutrophils 72 (*)     Lymphocytes 20 (*)     Immature Granulocytes 1 (*)     Segs Absolute 9.02 (*)     All other components within normal limits   BASIC METABOLIC PANEL W/ REFLEX TO MG FOR LOW K - Abnormal; Notable for the following components:    Glucose 106 (*)     BUN 5 (*)     CREATININE 0.45 (*)     Potassium 3.4 (*)     Anion Gap 18 (*)     All other components within normal limits   APTT - Abnormal; Notable for the following components:    PTT 18.2 (*)     All other components within normal limits   TROPONIN   HCG, SERUM, QUALITATIVE   D-DIMER, QUANTITATIVE   MAGNESIUM   PROTIME-INR           Xr Chest Portable    Result Date: 4/16/2021  EXAMINATION: ONE XRAY VIEW OF THE CHEST 4/16/2021 10:58 pm COMPARISON: 01/18/2021 HISTORY: ORDERING SYSTEM PROVIDED HISTORY: chest pain TECHNOLOGIST PROVIDED HISTORY: chest pain Reason for Exam: sob Acuity: Unknown Type of Exam: Unknown FINDINGS: There is complete opacification of the inferior 1/2 of the right hemithorax. Right upper lobe and left lung are clear. Heart size is normal.  No acute bone finding. Opacification of the inferior 1/2 of the right hemithorax likely represents a combination of middle lobe and right lower lobe consolidation and possible right pleural fluid. Follow-up CT chest recommended. Ct Chest Pulmonary Embolism W Contrast    Result Date: 4/17/2021  EXAMINATION: CTA OF THE CHEST 4/17/2021 12:25 am TECHNIQUE: CTA of the chest was performed after the administration of intravenous contrast.  Multiplanar reformatted images are provided for review. MIP images are provided for review. Dose modulation, iterative reconstruction, and/or weight based adjustment of the mA/kV was utilized to reduce the radiation dose to as low as reasonably achievable. COMPARISON: Chest portable April 16, 2021. CT chest abdomen and pelvis with contrast November 23, 2020. HISTORY: ORDERING SYSTEM PROVIDED HISTORY: h/o of DVT, pleuritic r chest pain TECHNOLOGIST PROVIDED HISTORY: h/o of DVT, pleuritic r chest pain Decision Support Exception->Emergency Medical Condition (MA) Reason for Exam: h/o of DVT, pleuritic r chest pain Acuity: Acute Type of Exam: Initial FINDINGS: Pulmonary Arteries: Study is limited by patient breathing motion related artifact. Pulmonary arteries are adequately opacified for evaluation.  Filling defect consistent with acute pulmonary embolism is seen involving the right basal trunk and 1. Single subsegmental pulmonary embolism without acute cor pulmonale ([facility] [address]          DISPOSITION / PLAN     DISPOSITION Admitted 04/17/2021 05:07:17 AM      PATIENT REFERRED TO:  No follow-up provider specified.     DISCHARGE MEDICATIONS:  New Prescriptions    No medications on file       Henry Nicole DO  Emergency Medicine Resident    (Please note that portions of this note were completed with a voice recognition program.Efforts were made to edit the dictations but occasionally words are mis-transcribed.)       Henry Nicole DO  Resident  04/17/21 8250

## 2021-04-17 NOTE — ED PROVIDER NOTES
Faculty Sign-Out Attestation  Handoff taken on the following patient from prior Attending Physician: Nayeli Summers    I was available and discussed any additional care issues that arose and coordinated the management plans with the resident(s) caring for the patient during my duty period. Any areas of disagreement with residents documentation of care or procedures are noted on the chart. I was personally present for the key portions of any/all procedures during my duty period. I have documented in the chart those procedures where I was not present during the key portions.     Back pain, ct r/o pe pending, hx of pe, not taking coumadin, getting lovenox, probable admit for med treatment,     Anupam Dodge DO  Attending Physician     Anupam Dodge,   04/16/21 4414    Ct >> PE +, lovenox, admitting to inter med,   Trop <6     Anupam Dodge DO  04/17/21 0254

## 2021-04-17 NOTE — H&P
[facility]  Office: [address], DO, Starlin Ganser, DO, Beliván Vallejo, DO, Smitha  Blood, DO, Ping Gupta MD, Nathaly Cleary MD, Alan Arechiga MD, Clover West MD, Ramon Rivera MD, Rosina Braxton MD, Heidi Oleary MD, Janine Turner MD, Reid oMrales, DO, Aliyah Barragan MD, Yoon Handy DO, Lexie Zaragoza MD,  Ander Hernandez DO, Maldonado Werner MD, Amy Boyd MD, Zeinab Ramon MD, Paulina Guzman MD, Deann Zaragoza, [facility], [facility], CNP, Laura Kevin, CNP, John Leon, CNS, Rebeka Phillips, CNP, Molly Rich, CNP, Yifan Colon, CNP, Parisa Talley, CNP, Princess Will, CNP, Yane Meyer PA-C, Chas Encarnacion, [facility], Maikol Lopez, CNP, Anitha Marquez, CNP, Pascual Arango, CNP, Henry Willingham, CNP, Manuel Schultz, CNP, Sarah Guerrero, CNP         [address]    HISTORY AND PHYSICAL EXAMINATION            Date:   4/17/2021  Patient name:  Neto Baoteng  Date of admission:  4/16/2021 10:30 PM  MRN:   [id number]  Account:  [de-identified]  YOB: 1980  PCP:    No primary care provider on file. Room:   2024/2024-01  Code Status:    Full Code    Chief Complaint:     Chief Complaint   Patient presents with    Back Pain     Right uypper back pain that wraps around. Hurts more upon inspiration. History Obtained From:     patient, electronic medical record    History of Present Illness:     Neto Boateng is a 45 y.o. Non-/non  female who presents with Back Pain (Right uypper back pain that wraps around. Hurts more upon inspiration. )   and is admitted to the hospital for the management of Right pulmonary embolus ([address] Pt is a 45 yr old AA female with PMH DVTs and tobacco who presented to the ED 4/16/21 with chest pain radiating to the back. She was diagnosed at [facility] in March 2020 with acute on chronic bilat DVTs. She was supposed to do a Lovenox/ Coumadin bridge and follow up. daily    Historical Provider, MD   acetaminophen (TYLENOL) 500 MG tablet Take 1 tablet by mouth 3 times daily as needed for Pain 18   Denece MD Loyda        Allergies:     Adhesive tape    Social History:     Tobacco:    reports that she has been smoking cigarettes. She has been smoking about 0.20 packs per day. She has never used smokeless tobacco.  Alcohol:      reports current alcohol use. Drug Use:  reports current drug use. Drug: Marijuana. Family History:     Family History   Problem Relation Age of Onset    High Blood Pressure Mother     High Blood Pressure Maternal Grandmother        Review of Systems:     Positive and Negative as described in HPI.     CONSTITUTIONAL:  negative for fevers, chills, sweats, fatigue, weight loss  HEENT:  negative for vision, hearing changes, runny nose, throat pain  RESPIRATORY:  Positive for shortness of breath, No cough, congestion, wheezing  CARDIOVASCULAR:  Positive for chest pain, no palpitations  GASTROINTESTINAL:  negative for vomiting, diarrhea, constipation, change in bowel habits, abdominal pain , + nausea  GENITOURINARY:  negative for difficulty of urination, burning with urination, frequency   INTEGUMENT:  negative for rash, skin lesions, easy bruising   HEMATOLOGIC/LYMPHATIC:  Positive for swelling/edema bilat LE  ALLERGIC/IMMUNOLOGIC:  negative for urticaria , itching  ENDOCRINE:  negative increase in drinking, increase in urination, hot or cold intolerance  MUSCULOSKELETAL:  negative joint pains, muscle aches, swelling of joints  NEUROLOGICAL:  negative for headaches, dizziness, lightheadedness, numbness, pain, tingling extremities  BEHAVIOR/PSYCH:  negative for depression, Positive anxiety    Physical Exam:   BP (!) 163/108   Pulse 105   Temp 99.1 °F (37.3 °C) (Oral)   Resp 26   Ht 5' 2\" (1.575 m)   Wt 160 lb (72.6 kg)   SpO2 100%   BMI 29.26 kg/m²   Temp (24hrs), Av.1 °F (37.3 °C), Min:99.1 °F (37.3 °C), Max:99.1 °F (37.3 °C)    No results for input(s): POCGLU in the last 72 hours.   No intake or output data in the 24 hours ending 04/17/21 0909    General Appearance: alert, ill appearing, and anxious with tachypnea  Mental status: oriented to person, place, and time, + anxiety  Head: normocephalic, atraumatic  Eye: no icterus, redness, pupils equal and reactive, extraocular eye movements intact, conjunctiva clear  Ear: normal external ear, no discharge, hearing intact  Nose: no drainage noted  Mouth: mucous membranes moist  Neck: supple, no carotid bruits, thyroid not palpable  Lungs: Reduced BS right lung base, + wheeze, shallow breaths  Cardiovascular: Tachy, regular rhythm, no murmur, gallop, rub  Abdomen: Soft, nontender, nondistended, normal bowel sounds, no hepatomegaly or splenomegaly  Neurologic: There are no new focal motor or sensory deficits, normal muscle tone and bulk, no abnormal sensation, normal speech, cranial nerves II through XII grossly intact  Skin: No gross lesions, rashes, bruising or bleeding on exposed skin area  Extremities: peripheral pulses palpable, + edema Rt LE >  Lt LE  Psych: anxious, tearful    Investigations:      Laboratory Testing:  Recent Results (from the past 24 hour(s))   CBC Auto Differential    Collection Time: 04/16/21 10:56 PM   Result Value Ref Range    WBC 12.3 (H) 3.5 - 11.3 k/uL    RBC 4.37 3.95 - 5.11 m/uL    Hemoglobin 14.5 11.9 - 15.1 g/dL    Hematocrit 40.3 36.3 - 47.1 %    MCV 92.2 82.6 - 102.9 fL    MCH 33.2 25.2 - 33.5 pg    MCHC 36.0 (H) 28.4 - 34.8 g/dL    RDW 17.0 (H) 11.8 - 14.4 %    Platelets 501 251 - 409 k/uL    MPV 11.6 8.1 - 13.5 fL    NRBC Automated 0.0 0.0 per 100 WBC    Differential Type NOT REPORTED     Seg Neutrophils 72 (H) 36 - 65 %    Lymphocytes 20 (L) 24 - 43 %    Monocytes 6 3 - 12 %    Eosinophils % 1 1 - 4 %    Basophils 0 0 - 2 %    Immature Granulocytes 1 (H) 0 %    Segs Absolute 9.02 (H) 1.50 - 8.10 k/uL    Absolute Lymph # 2.40 1.10 - 3.70 k/uL    Absolute Mono # 0.70 pulmonary embolism involving the right basal trunk and segmental branches of the right lower lung lobe. 2. Associated multifocal ill-defined right lower lung lobe consolidation consistent with congestion associated with pulmonary embolism. 3. Overall moderate clot burden. 4. No CT evidence of right-sided heart strain. 5. 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 6. Mild cardiomegaly. 7. Mild biapical paraseptal emphysema. 8. Note: Study limited by patient breathing motion related artifact. Critical results were called by Dr. Vinson Gowers to Justin Vazquez on 4/17/2021 at 04:26. Assessment :      Hospital Problems           Last Modified POA    * (Principal) Right pulmonary embolus ([address] 4/17/2021 Yes    Hypertension 4/17/2021 Yes    History of blood clots 4/17/2021 Yes    Smoker 4/17/2021 Yes    DVT of deep femoral vein, bilateral ([address] 4/17/2021 Yes    Chest pain on breathing 4/17/2021 Yes    Anxiety 4/17/2021 Yes    Non compliance w medication regimen 4/17/2021 Yes          Plan:     Patient status inpatient in the Progressive Unit/Step down    1. Mod Right PE- hx bilat DVTs in Jan 2021, non compliant with Coumadin. Started on Lovenox BID. May benefit from a NOAC, will need to check pricing with Social work. Check Bilat LE Dopplers, Echo, Mild cardiomegaly seen, add on hypercoag panel. Consult Pulmonary for possible Pulmonary infarct. 2. Chest pain from #1- Percocet q 4 hrs prn  3. HTn- Monitor BP, Cozaar resumed  4. Tobacco abuse- encouraged cessation, refused Nicotine patch. 5. Anxiety- start Ativan 0.5mg po q6 hrs prn  6. Gi proph      Consultations:   IP CONSULT TO HOSPITALIST  IP CONSULT TO PULMONOLOGY     Patient is admitted as inpatient status because of co-morbidities listed above, severity of signs and symptoms as outlined, requirement for current medical therapies and most importantly because of direct risk to patient if care not provided in a hospital setting.   Expected length of stay > 48 hours. Bernie Lamas MD  4/17/2021  9:09 AM    Copy sent to Dr. Amarjit Trotter primary care provider on file.

## 2021-04-17 NOTE — CONSULTS
Pulmonary/Critical Care consultation    Patient's name:  Mt Gentile  Medical Record Number: [id number]  Patient's account/billing number: [de-identified]  Patient's YOB: 1980  Age: 45 y.o. Date of Admission: 2021 10:30 PM  Date of Consult: 2021      Primary Care Physician: No primary care provider on file. Code Status: Full Code failed    Reason for consult: Pulmonary embolism with possible small right pleural effusion and atelectasis versus infarction versus pneumonia      HISTORY OF PRESENT ILLNESS:   History was obtained from chart review and the patient. Ms. Mt Gentile is a 45 y.o. -American woman with known history of pulmonary medicine diagnosed with first-time about 16 years ago but has been noncompliant with her treatment presents to the hospital with right upper back pain that wraps around the chest that hurts with inspiration. A CT scan of the chest was done that showed an acute pulmonary embolism involving the right basal trunk and segmental branches of the right lower lobe. There was also right lower lobe consolidation but no CT evidence of right heart strain and mild cardiomegaly was seen along with paraseptal emphysema  Patient denied any night sweats, cough or fever        Past Medical History:        Diagnosis Date    DVT (deep vein thrombosis) in pregnancy     H/O blood clots     PE, legs B/L, no trauma, h/o Depo provera use       Past Surgical History:        Procedure Laterality Date     SECTION  , 2008    x 2    TUBAL LIGATION         Allergies: Allergies   Allergen Reactions    Adhesive Tape Rash         Home Meds:   Prior to Admission medications    Medication Sig Start Date End Date Taking?  Authorizing Provider   ibuprofen (ADVIL;MOTRIN) 600 MG tablet Take 1 tablet by mouth 4 times daily as needed for Pain 20   Matthias Shelling, DO   warfarin (COUMADIN) 5 MG tablet Take 1 tablet by mouth daily for 14 days Maintain INR 2-3 3/30/20 4/13/20  Marquise Trejo MD   Elastic Bandages & Supports (MEDICAL COMPRESSION THIGH HIGH) MISC Place compression stocking on each leg when you wake up. Take the stockings off at night when you go to bed. Apply them as soon as you can in the morning and take them off just before you go to sleep. 3/30/20   Marquise Trejo MD   acetaminophen (TYLENOL) 500 MG tablet Take 2 tablets by mouth every 8 hours as needed for Pain 11/30/19 12/10/19  Marquise Trejo MD   vitamin B-6 (PYRIDOXINE) 50 MG tablet Take 50 mg by mouth daily    Historical Provider, MD   senna-docusate (Orly Salter) 8.6-50 MG per tablet Take 1 tablet by mouth daily    Historical Provider, MD   losartan (COZAAR) 50 MG tablet Take 50 mg by mouth daily    Historical Provider, MD   acetaminophen (TYLENOL) 500 MG tablet Take 1 tablet by mouth 3 times daily as needed for Pain 12/18/18   Colt Benites MD       Family History:       Problem Relation Age of Onset    High Blood Pressure Mother     High Blood Pressure Maternal Grandmother          Social History:   TOBACCO:   reports that she has been smoking cigarettes. She has been smoking about 0.20 packs per day. She has never used smokeless tobacco.  ETOH:   reports current alcohol use. DRUGS:  reports current drug use. Drug: Marijuana.   OCCUPATION:   Noncontributory          REVIEW OF SYSTEMS:    Review of Systems -   General ROS: Completed and except as mentioned above were negative   Psychological ROS:  Completed and except as mentioned above were negative  Ophthalmic ROS:  Completed and except as mentioned above were negative  ENT ROS:  Completed and except as mentioned above were negative  Allergy and Immunology ROS:  Completed and except as mentioned above were negative  Hematological and Lymphatic ROS:  Completed and except as mentioned above were negative  Endocrine ROS: Completed and except as mentioned above were negative Breast ROS:  Completed and except as mentioned above were negative  Respiratory ROS:  Completed and except as mentioned above were negative  Cardiovascular ROS:  Completed and except as mentioned above were negative  Gastrointestinal ROS: Completed and except as mentioned above were negative  Genito-Urinary ROS:  Completed and except as mentioned above were negative  Musculoskeletal ROS:  Completed and except as mentioned above were negative  Neurological ROS:  Completed and except as mentioned above were negative  Dermatological ROS:  Completed and except as mentioned above were negative          Physical Exam:    Vitals: BP (!) 163/108   Pulse 105   Temp 99.1 °F (37.3 °C) (Oral)   Resp 26   Ht 5' 2\" (1.575 m)   Wt 160 lb (72.6 kg)   SpO2 100%   BMI 29.26 kg/m²     Last Body weight:   Wt Readings from Last 3 Encounters:   04/16/21 160 lb (72.6 kg)   01/18/21 160 lb (72.6 kg)   03/30/20 165 lb (74.8 kg)       Body Mass Index : Body mass index is 29.26 kg/m². Intake and Output summary: No intake or output data in the 24 hours ending 04/17/21 0911    Physical Examination:   PHYSICAL EXAMINATION:  Vitals:    04/17/21 0716 04/17/21 0815 04/17/21 0818 04/17/21 0820   BP: (!) 163/108      Pulse: 104 105     Resp: 26      Temp:       TempSrc:       SpO2: 99% 97% 99% 100%   Weight:       Height:         Constitutional: This is a well developed, well nourished, 25-29.9 - Overweight 45y.o. year old female who is alert, oriented, cooperative and in no apparent distress. Head:normocephalic and atraumatic. EENT:   COURTNEY. No conjunctival injections. Septum was midline, mucosa was without erythema, exudates or cobblestoning. No thrush was noted. Mallampati II (soft palate, uvula, fauces visible)  Neck: Supple without thyromegaly. No elevated JVP. Trachea was midline. Respiratory: Chest was symmetrical without dullness to percussion. Breath sounds bilaterally were clear to auscultation.  There were no wheezes, rhonchi or rales. There is no intercostal retraction or use of accessory muscles. No egophony noted. Breath sounds were decreased in the right base of the lungs  Cardiovascular: Regular without murmur, clicks, gallops or rubs. Abdomen: Slightly rounded and soft without organomegaly. No rebound tenderness, rigidity or guarding was appreciated. Lymphatic: No lymphadenopathy. Musculoskeletal: Normal curvature of the spine. No gross muscle weakness. Extremities:  No lower extremity edema, ulcerations, tenderness, varicosities or erythema. Muscle size, tone and strength are normal.  No involuntary movements are noted. Skin:  Warm and dry. Good color, turgor and pigmentation. No lesions or scars. No cyanosis or clubbing  Neurological/Psychiatric: The patient's general behavior, level of consciousness, thought content and emotional status is normal.            Laboratory findings:-    CBC:   Recent Labs     04/16/21 2256   WBC 12.3*   HGB 14.5        BMP:    Recent Labs     04/16/21 2256      K 3.4*   CL 99   CO2 20   BUN 5*   CREATININE 0.45*   GLUCOSE 106*     S. Calcium:  Recent Labs     04/16/21  2256   CALCIUM 10.1     S. Ionized Calcium:No results for input(s): IONCA in the last 72 hours. S. Magnesium:  Recent Labs     04/16/21 2256   MG 1.6     S. Phosphorus:No results for input(s): PHOS in the last 72 hours. S. Glucose:No results for input(s): POCGLU in the last 72 hours. Glycosylated hemoglobin A1C: No results for input(s): LABA1C in the last 72 hours. INR:   Recent Labs     04/17/21  0522   INR 0.9     Hepatic functions: No results for input(s): ALKPHOS, ALT, AST, PROT, BILITOT, BILIDIR, LABALBU in the last 72 hours. Pancreatic functions:No results for input(s): LACTA, AMYLASE in the last 72 hours. S. Lactic Acid: No results for input(s): LACTA in the last 72 hours. Cardiac enzymes:No results for input(s): CKTOTAL, CKMB, CKMBINDEX, TROPONINI in the last 72 hours. BNP:No results for input(s): BNP in the last 72 hours. Lipid profile: No results for input(s): CHOL, TRIG, HDL, LDLCALC in the last 72 hours. Invalid input(s): LDL  Blood Gases: No results found for: PH, PCO2, PO2, HCO3, O2SAT  Thyroid functions:   Lab Results   Component Value Date    TSH 0.55 01/18/2021            Microbiology:    Cultures during this admission:     Blood cultures:      [] None drawn      [] Negative             []  Positive (Details:  )  Urine Culture:        [] None drawn      [] Negative             []  Positive (Details:  )  Sputum Culture:   [] None drawn       [] Negative             []  Positive (Details:  )         Radiological reports:  CXR    Showed right basilar infiltrate    CT Scans    CT scan of the chest for pulmonary embolism was done earlier in the day and it showed-    1. Acute pulmonary embolism involving the right basal trunk and segmental   branches of the right lower lung lobe. 2. Associated multifocal ill-defined right lower lung lobe consolidation   consistent with congestion associated with pulmonary embolism. 3. Overall moderate clot burden. 4. No CT evidence of right-sided heart strain. 5. 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 6. Mild cardiomegaly. 7. Mild biapical paraseptal emphysema. Assessment and Plan     Principal Problem:    Right pulmonary embolus (HCC)  Active Problems:    Hypertension    History of blood clots    Smoker    DVT of deep femoral vein, bilateral (HCC)    Chest pain on breathing    Anxiety    Non compliance w medication regimen  Resolved Problems:    * No resolved hospital problems. *          Additional assessment:    1. Emphysema  2. Right lower lobe infiltrate could be atelectasis due to splinting from chest pain.   Patient does not have any symptoms suggestive of pneumonia      Plan:    Continue anticoagulation and agree with using direct oral anticoagulant therapy to help improve the compliance Patient was educated on the importance of compliance with treatment of DVT and PE noncompliance to which could lead to death  Educated and clarified the medication use. Discussed use, benefit, and side effects of prescribed medications. Barriers to medication compliance addressed. Sheree Meyers received counseling on the following healthy behaviors: nutrition, exercise and medication adherence  Recommend flu vaccination in the fall annually. Recommendations given regarding pneumococcal vaccinations. Maintain an active lifestyle. Recommend smoking cessation. Sheree Meyers was instructed on smoking cessation for greater than 10 minutes. I discussed with this patient today the risks and benefits of various tobacco cessation strategies  All the questions that the patient has had were answered to her satisfaction  Home O2 evaluation to be done at the time of discharge. Supplemental oxygen was to be continued  Patient was informed of the need for using oxygen. Patient was educated on the importance of compliance and the benefits of oxygen use. Complications of oxygen use, including dryness of the nostrils, epistaxis and also the fire hazard were explained to the patient. Patient willingly accepts to use  the oxygen as recommended. Chest x-ray was reviewed. CT scan of the chest was reviewed. After reviewing the patient's smoking history and his age patient does not meet the criteria for lung cancer screening. Thank you for having us involved in the care of your patient. Please call us if you have any questions or concerns.       Kamila Ambrosio M.D.            4/17/2021, 9:11 AM

## 2021-04-17 NOTE — ED NOTES
Per CT, pt unable to get CT due to not holding still.  Dr Phuc Leiva notified     Alfredo Inman RN  04/17/21 5979

## 2021-04-17 NOTE — ED NOTES
Pt requesting additional pain medication order other than prn tylenol, admitting team notified     Samreen Crouch RN  04/17/21 0459

## 2021-04-18 ENCOUNTER — APPOINTMENT (OUTPATIENT)
Dept: GENERAL RADIOLOGY | Age: 41
DRG: 134 | End: 2021-04-18
Payer: MEDICARE

## 2021-04-18 PROBLEM — E87.6 HYPOKALEMIA: Status: ACTIVE | Noted: 2021-04-18

## 2021-04-18 LAB
-: ABNORMAL
AMORPHOUS: ABNORMAL
ANION GAP SERPL CALCULATED.3IONS-SCNC: 12 MMOL/L (ref 9–17)
ANION GAP SERPL CALCULATED.3IONS-SCNC: 15 MMOL/L (ref 9–17)
BACTERIA: ABNORMAL
BILIRUBIN URINE: NEGATIVE
BUN BLDV-MCNC: 3 MG/DL (ref 6–20)
BUN BLDV-MCNC: 4 MG/DL (ref 6–20)
BUN/CREAT BLD: ABNORMAL (ref 9–20)
BUN/CREAT BLD: ABNORMAL (ref 9–20)
CALCIUM SERPL-MCNC: 8.5 MG/DL (ref 8.6–10.4)
CALCIUM SERPL-MCNC: 9 MG/DL (ref 8.6–10.4)
CASTS UA: ABNORMAL /LPF (ref 0–8)
CHLORIDE BLD-SCNC: 96 MMOL/L (ref 98–107)
CHLORIDE BLD-SCNC: 98 MMOL/L (ref 98–107)
CO2: 21 MMOL/L (ref 20–31)
CO2: 21 MMOL/L (ref 20–31)
COLOR: ABNORMAL
COMMENT UA: ABNORMAL
CREAT SERPL-MCNC: 0.38 MG/DL (ref 0.5–0.9)
CREAT SERPL-MCNC: 0.45 MG/DL (ref 0.5–0.9)
CRYSTALS, UA: ABNORMAL /HPF
EPITHELIAL CELLS UA: ABNORMAL /HPF (ref 0–5)
GFR AFRICAN AMERICAN: >60 ML/MIN
GFR AFRICAN AMERICAN: >60 ML/MIN
GFR NON-AFRICAN AMERICAN: >60 ML/MIN
GFR NON-AFRICAN AMERICAN: >60 ML/MIN
GFR SERPL CREATININE-BSD FRML MDRD: ABNORMAL ML/MIN/{1.73_M2}
GLUCOSE BLD-MCNC: 82 MG/DL (ref 70–99)
GLUCOSE BLD-MCNC: 89 MG/DL (ref 70–99)
GLUCOSE URINE: NEGATIVE
HCT VFR BLD CALC: 33.7 % (ref 36.3–47.1)
HCT VFR BLD CALC: 34.7 % (ref 36.3–47.1)
HEMOGLOBIN: 12.3 G/DL (ref 11.9–15.1)
HEMOGLOBIN: 12.3 G/DL (ref 11.9–15.1)
INR BLD: 0.9
KETONES, URINE: ABNORMAL
LACTIC ACID, SEPSIS WHOLE BLOOD: 0.7 MMOL/L (ref 0.5–1.9)
LACTIC ACID, SEPSIS: NORMAL MMOL/L (ref 0.5–1.9)
LEUKOCYTE ESTERASE, URINE: NEGATIVE
MAGNESIUM: 1.5 MG/DL (ref 1.6–2.6)
MAGNESIUM: 1.5 MG/DL (ref 1.6–2.6)
MCH RBC QN AUTO: 33.2 PG (ref 25.2–33.5)
MCH RBC QN AUTO: 33.3 PG (ref 25.2–33.5)
MCHC RBC AUTO-ENTMCNC: 35.4 G/DL (ref 28.4–34.8)
MCHC RBC AUTO-ENTMCNC: 36.5 G/DL (ref 28.4–34.8)
MCV RBC AUTO: 91.3 FL (ref 82.6–102.9)
MCV RBC AUTO: 93.8 FL (ref 82.6–102.9)
MUCUS: ABNORMAL
NITRITE, URINE: NEGATIVE
NRBC AUTOMATED: 0 PER 100 WBC
NRBC AUTOMATED: 0 PER 100 WBC
OTHER OBSERVATIONS UA: ABNORMAL
PDW BLD-RTO: 16.5 % (ref 11.8–14.4)
PDW BLD-RTO: 17.2 % (ref 11.8–14.4)
PH UA: 6 (ref 5–8)
PLATELET # BLD: 251 K/UL (ref 138–453)
PLATELET # BLD: 258 K/UL (ref 138–453)
PMV BLD AUTO: 10.8 FL (ref 8.1–13.5)
PMV BLD AUTO: 11 FL (ref 8.1–13.5)
POTASSIUM SERPL-SCNC: 2.7 MMOL/L (ref 3.7–5.3)
POTASSIUM SERPL-SCNC: 2.9 MMOL/L (ref 3.7–5.3)
PROCALCITONIN: 5.51 NG/ML
PROTEIN UA: ABNORMAL
PROTHROMBIN TIME: 9.8 SEC (ref 9.1–12.3)
RBC # BLD: 3.69 M/UL (ref 3.95–5.11)
RBC # BLD: 3.7 M/UL (ref 3.95–5.11)
RBC UA: ABNORMAL /HPF (ref 0–4)
RENAL EPITHELIAL, UA: ABNORMAL /HPF
SARS-COV-2, RAPID: NOT DETECTED
SODIUM BLD-SCNC: 131 MMOL/L (ref 135–144)
SODIUM BLD-SCNC: 132 MMOL/L (ref 135–144)
SPECIFIC GRAVITY UA: 1.03 (ref 1–1.03)
SPECIMEN DESCRIPTION: NORMAL
TRICHOMONAS: ABNORMAL
TURBIDITY: CLEAR
URINE HGB: NEGATIVE
UROBILINOGEN, URINE: NORMAL
WBC # BLD: 16.7 K/UL (ref 3.5–11.3)
WBC # BLD: 17 K/UL (ref 3.5–11.3)
WBC UA: ABNORMAL /HPF (ref 0–5)
YEAST: ABNORMAL

## 2021-04-18 PROCEDURE — 81240 F2 GENE: CPT

## 2021-04-18 PROCEDURE — 85240 CLOT FACTOR VIII AHG 1 STAGE: CPT

## 2021-04-18 PROCEDURE — 99233 SBSQ HOSP IP/OBS HIGH 50: CPT | Performed by: INTERNAL MEDICINE

## 2021-04-18 PROCEDURE — 81001 URINALYSIS AUTO W/SCOPE: CPT

## 2021-04-18 PROCEDURE — 71045 X-RAY EXAM CHEST 1 VIEW: CPT

## 2021-04-18 PROCEDURE — 84145 PROCALCITONIN (PCT): CPT

## 2021-04-18 PROCEDURE — 83735 ASSAY OF MAGNESIUM: CPT

## 2021-04-18 PROCEDURE — 86147 CARDIOLIPIN ANTIBODY EA IG: CPT

## 2021-04-18 PROCEDURE — 85730 THROMBOPLASTIN TIME PARTIAL: CPT

## 2021-04-18 PROCEDURE — 94761 N-INVAS EAR/PLS OXIMETRY MLT: CPT

## 2021-04-18 PROCEDURE — 85303 CLOT INHIBIT PROT C ACTIVITY: CPT

## 2021-04-18 PROCEDURE — 2580000003 HC RX 258: Performed by: NURSE PRACTITIONER

## 2021-04-18 PROCEDURE — 87635 SARS-COV-2 COVID-19 AMP PRB: CPT

## 2021-04-18 PROCEDURE — 2060000000 HC ICU INTERMEDIATE R&B

## 2021-04-18 PROCEDURE — 85306 CLOT INHIBIT PROT S FREE: CPT

## 2021-04-18 PROCEDURE — 86146 BETA-2 GLYCOPROTEIN ANTIBODY: CPT

## 2021-04-18 PROCEDURE — 85300 ANTITHROMBIN III ACTIVITY: CPT

## 2021-04-18 PROCEDURE — 85610 PROTHROMBIN TIME: CPT

## 2021-04-18 PROCEDURE — 80048 BASIC METABOLIC PNL TOTAL CA: CPT

## 2021-04-18 PROCEDURE — 36415 COLL VENOUS BLD VENIPUNCTURE: CPT

## 2021-04-18 PROCEDURE — 76937 US GUIDE VASCULAR ACCESS: CPT

## 2021-04-18 PROCEDURE — 6370000000 HC RX 637 (ALT 250 FOR IP): Performed by: INTERNAL MEDICINE

## 2021-04-18 PROCEDURE — 81241 F5 GENE: CPT

## 2021-04-18 PROCEDURE — 6360000002 HC RX W HCPCS: Performed by: INTERNAL MEDICINE

## 2021-04-18 PROCEDURE — 85027 COMPLETE CBC AUTOMATED: CPT

## 2021-04-18 PROCEDURE — 6370000000 HC RX 637 (ALT 250 FOR IP): Performed by: NURSE PRACTITIONER

## 2021-04-18 PROCEDURE — 83605 ASSAY OF LACTIC ACID: CPT

## 2021-04-18 PROCEDURE — 6360000002 HC RX W HCPCS: Performed by: NURSE PRACTITIONER

## 2021-04-18 PROCEDURE — 87040 BLOOD CULTURE FOR BACTERIA: CPT

## 2021-04-18 PROCEDURE — 85613 RUSSELL VIPER VENOM DILUTED: CPT

## 2021-04-18 PROCEDURE — 2700000000 HC OXYGEN THERAPY PER DAY

## 2021-04-18 RX ORDER — DRONABINOL 2.5 MG/1
2.5 CAPSULE ORAL 2 TIMES DAILY
Status: DISCONTINUED | OUTPATIENT
Start: 2021-04-18 | End: 2021-04-19 | Stop reason: HOSPADM

## 2021-04-18 RX ORDER — POTASSIUM CHLORIDE 7.45 MG/ML
10 INJECTION INTRAVENOUS
Status: DISPENSED | OUTPATIENT
Start: 2021-04-18 | End: 2021-04-18

## 2021-04-18 RX ORDER — POTASSIUM CHLORIDE 20 MEQ/1
40 TABLET, EXTENDED RELEASE ORAL 2 TIMES DAILY WITH MEALS
Status: DISCONTINUED | OUTPATIENT
Start: 2021-04-19 | End: 2021-04-19 | Stop reason: HOSPADM

## 2021-04-18 RX ORDER — POTASSIUM CHLORIDE 20 MEQ/1
40 TABLET, EXTENDED RELEASE ORAL
Status: DISCONTINUED | OUTPATIENT
Start: 2021-04-18 | End: 2021-04-18

## 2021-04-18 RX ADMIN — MAGNESIUM SULFATE HEPTAHYDRATE 1000 MG: 1 INJECTION, SOLUTION INTRAVENOUS at 22:43

## 2021-04-18 RX ADMIN — SODIUM CHLORIDE, PRESERVATIVE FREE 10 ML: 5 INJECTION INTRAVENOUS at 20:15

## 2021-04-18 RX ADMIN — LEVOFLOXACIN 500 MG: 500 TABLET, FILM COATED ORAL at 07:52

## 2021-04-18 RX ADMIN — ENOXAPARIN SODIUM 70 MG: 80 INJECTION SUBCUTANEOUS at 07:52

## 2021-04-18 RX ADMIN — ENOXAPARIN SODIUM 70 MG: 80 INJECTION SUBCUTANEOUS at 20:13

## 2021-04-18 RX ADMIN — POTASSIUM BICARBONATE 40 MEQ: 782 TABLET, EFFERVESCENT ORAL at 20:15

## 2021-04-18 RX ADMIN — OXYCODONE HYDROCHLORIDE AND ACETAMINOPHEN 2 TABLET: 5; 325 TABLET ORAL at 15:45

## 2021-04-18 RX ADMIN — PANTOPRAZOLE SODIUM 40 MG: 40 TABLET, DELAYED RELEASE ORAL at 07:51

## 2021-04-18 RX ADMIN — OXYCODONE HYDROCHLORIDE AND ACETAMINOPHEN 2 TABLET: 5; 325 TABLET ORAL at 03:22

## 2021-04-18 RX ADMIN — HYDRALAZINE HYDROCHLORIDE 10 MG: 20 INJECTION INTRAMUSCULAR; INTRAVENOUS at 20:16

## 2021-04-18 RX ADMIN — OXYCODONE HYDROCHLORIDE AND ACETAMINOPHEN 2 TABLET: 5; 325 TABLET ORAL at 20:15

## 2021-04-18 RX ADMIN — LOSARTAN POTASSIUM 50 MG: 50 TABLET, FILM COATED ORAL at 07:52

## 2021-04-18 RX ADMIN — DRONABINOL 2.5 MG: 2.5 CAPSULE ORAL at 13:10

## 2021-04-18 RX ADMIN — DOCUSATE SODIUM 50MG AND SENNOSIDES 8.6MG 2 TABLET: 8.6; 5 TABLET, FILM COATED ORAL at 07:51

## 2021-04-18 RX ADMIN — MORPHINE SULFATE 4 MG: 4 INJECTION, SOLUTION INTRAMUSCULAR; INTRAVENOUS at 11:06

## 2021-04-18 RX ADMIN — DRONABINOL 2.5 MG: 2.5 CAPSULE ORAL at 20:15

## 2021-04-18 RX ADMIN — OXYCODONE HYDROCHLORIDE AND ACETAMINOPHEN 2 TABLET: 5; 325 TABLET ORAL at 07:52

## 2021-04-18 RX ADMIN — Medication 50 MG: at 07:51

## 2021-04-18 RX ADMIN — POTASSIUM BICARBONATE 40 MEQ: 782 TABLET, EFFERVESCENT ORAL at 10:41

## 2021-04-18 ASSESSMENT — PAIN SCALES - GENERAL
PAINLEVEL_OUTOF10: 10

## 2021-04-18 ASSESSMENT — PAIN DESCRIPTION - LOCATION
LOCATION: BACK
LOCATION: BACK

## 2021-04-18 NOTE — CARE COORDINATION
Case Management Initial Discharge Plan  Jesse Topete,             Met with:patient to discuss discharge plans. Information verified: address, contacts, phone number, , insurance Yes    Emergency Contact/Next of Kin name & number: donavon moser    PCP: No primary care provider on file. Insurance Provider: [employer]     Discharge Planning    Living Arrangements:  Spouse/Significant Other   Support Systems:  Spouse/Significant Other    Home has 2 stories    Location of bedroom 2nd/bathroom in home 1st    Patient able to perform ADL's:Independent    Current Services (outpatient & in home) none  DME equipment: none      Receiving oral anticoagulation therapy? No         Potential Assistance Needed:  Needs to est pcp        Prior SNF/Rehab Placement and Facility:yes 2 yrs ago     Evaluation: no    Expected Discharge date:  21    Patient expects to be discharged to:  Home  Follow Up Appointment: Best Day/ Time: Tuesday PM    Transportation provider: family   Transportation arrangements needed for discharge: No    Readmission Risk              Risk of Unplanned Readmission:        10             Does patient have a readmission risk score greater than 14?: No  If yes, follow-up appointment must be made within 7 days of discharge.      Goals of Care: return home without shortness of breath        Discharge Plan: return home independently           Electronically signed by Faith Hull RN on 21 at 11:17 AM EDT

## 2021-04-18 NOTE — CONSULTS
Pulmonary/Critical Care consultation    Patient's name:  Karen Ramirez  Medical Record Number: [id number]  Patient's account/billing number: [de-identified]  Patient's YOB: 1980  Age: 45 y.o. Date of Admission: 2021 10:30 PM  Date of Consult: 2021      Primary Care Physician: No primary care provider on file. Code Status: Full Code failed    Reason for consult: Pulmonary embolism with possible small right pleural effusion and atelectasis versus infarction versus pneumonia      HISTORY OF PRESENT ILLNESS:   History was obtained from chart review and the patient. Ms. Karen Ramirez is a 45 y.o. -American woman with known history of pulmonary medicine diagnosed with first-time about 16 years ago but has been noncompliant with her treatment presents to the hospital with right upper back pain that wraps around the chest that hurts with inspiration. A CT scan of the chest was done that showed an acute pulmonary embolism involving the right basal trunk and segmental branches of the right lower lobe. There was also right lower lobe consolidation but no CT evidence of right heart strain and mild cardiomegaly was seen along with paraseptal emphysema  Patient denied any night sweats, cough or fever    Interval history:  2021    Patient has increasing shortness of breath  Chest pain still persists  Occasional cough  She denied any fever she says her temperature is usually a little bit on higher side  On 3 L oxygen by nasal cannula  No hemoptysis  No orthopnea or PND      Past Medical History:        Diagnosis Date    DVT (deep vein thrombosis) in pregnancy     H/O blood clots     PE, legs B/L, no trauma, h/o Depo provera use    Other emphysema ([facility] [address] 2021       Past Surgical History:        Procedure Laterality Date     SECTION  , 2008    x 2    TUBAL LIGATION         Allergies:     Allergies   Allergen Reactions    Adhesive Tape Rash         Home Meds:   Prior to Admission medications    Medication Sig Start Date End Date Taking? Authorizing Provider   ibuprofen (ADVIL;MOTRIN) 600 MG tablet Take 1 tablet by mouth 4 times daily as needed for Pain 11/24/20  Yes Nirav Paul DO   acetaminophen (TYLENOL) 500 MG tablet Take 2 tablets by mouth every 8 hours as needed for Pain 11/30/19 4/17/21 Yes Autumn Magdaleno MD   warfarin (COUMADIN) 5 MG tablet Take 1 tablet by mouth daily for 14 days Maintain INR 2-3 3/30/20 4/13/20  Autumn Magdaleno MD   Elastic Bandages & Supports (MEDICAL COMPRESSION THIGH HIGH) MISC Place compression stocking on each leg when you wake up. Take the stockings off at night when you go to bed. Apply them as soon as you can in the morning and take them off just before you go to sleep. 3/30/20   Autumn Magdaleno MD       Family History:       Problem Relation Age of Onset    High Blood Pressure Mother     High Blood Pressure Maternal Grandmother          Social History:   TOBACCO:   reports that she has been smoking cigarettes. She has been smoking about 0.20 packs per day. She has never used smokeless tobacco.  ETOH:   reports current alcohol use. DRUGS:  reports current drug use. Drug: Marijuana.   OCCUPATION:   Noncontributory          REVIEW OF SYSTEMS:    Review of Systems -   General ROS: Completed and except as mentioned above were negative   Psychological ROS:  Completed and except as mentioned above were negative  Ophthalmic ROS:  Completed and except as mentioned above were negative  ENT ROS:  Completed and except as mentioned above were negative  Allergy and Immunology ROS:  Completed and except as mentioned above were negative  Hematological and Lymphatic ROS:  Completed and except as mentioned above were negative  Endocrine ROS: Completed and except as mentioned above were negative  Breast ROS:  Completed and except as mentioned above were negative  Respiratory ROS: Completed and except as mentioned above were negative  Cardiovascular ROS:  Completed and except as mentioned above were negative  Gastrointestinal ROS: Completed and except as mentioned above were negative  Genito-Urinary ROS:  Completed and except as mentioned above were negative  Musculoskeletal ROS:  Completed and except as mentioned above were negative  Neurological ROS:  Completed and except as mentioned above were negative  Dermatological ROS:  Completed and except as mentioned above were negative          Physical Exam:    Vitals: BP (!) 151/100   Pulse 108   Temp 99.1 °F (37.3 °C) (Oral)   Resp (!) 31   Ht 5' 2\" (1.575 m)   Wt 160 lb (72.6 kg)   SpO2 100%   BMI 29.26 kg/m²     Last Body weight:   Wt Readings from Last 3 Encounters:   04/16/21 160 lb (72.6 kg)   01/18/21 160 lb (72.6 kg)   03/30/20 165 lb (74.8 kg)       Body Mass Index : Body mass index is 29.26 kg/m². Intake and Output summary:     Intake/Output Summary (Last 24 hours) at 4/18/2021 0951  Last data filed at 4/17/2021 1651  Gross per 24 hour   Intake 400 ml   Output    Net 400 ml       Physical Examination:   PHYSICAL EXAMINATION:  Vitals:    04/18/21 0336 04/18/21 0337 04/18/21 0338 04/18/21 0853   BP:  (!) 151/100     Pulse: 109 107 108    Resp: 27 (!) 31 27 (!) 31   Temp:  99.1 °F (37.3 °C)     TempSrc:  Oral     SpO2: 100% 100% 100% 100%   Weight:       Height:         Constitutional: This is a well developed, well nourished, 25-29.9 - Overweight 45y.o. year old female who is alert, oriented, cooperative and in no apparent distress. Head:normocephalic and atraumatic. EENT:   COURTNEY. No conjunctival injections. Septum was midline, mucosa was without erythema, exudates or cobblestoning. No thrush was noted. Mallampati II (soft palate, uvula, fauces visible)  Neck: Supple without thyromegaly. No elevated JVP. Trachea was midline. Respiratory: Chest was symmetrical without dullness to percussion.   Breath sounds bilaterally were clear to auscultation. There were no wheezes, rhonchi or rales. There is no intercostal retraction or use of accessory muscles. No egophony noted. Breath sounds were decreased in the right base of the lungs  Cardiovascular: Regular without murmur, clicks, gallops or rubs. Abdomen: Slightly rounded and soft without organomegaly. No rebound tenderness, rigidity or guarding was appreciated. Lymphatic: No lymphadenopathy. Musculoskeletal: Normal curvature of the spine. No gross muscle weakness. Extremities:  No lower extremity edema, ulcerations, tenderness, varicosities or erythema. Muscle size, tone and strength are normal.  No involuntary movements are noted. Skin:  Warm and dry. Good color, turgor and pigmentation. No lesions or scars. No cyanosis or clubbing  Neurological/Psychiatric: The patient's general behavior, level of consciousness, thought content and emotional status is normal.            Laboratory findings:-    CBC:   Recent Labs     04/18/21  0700   WBC 16.7*   HGB 12.3        BMP:    Recent Labs     04/16/21  2256 04/18/21  0700    131*   K 3.4* 2.9*   CL 99 98   CO2 20 21   BUN 5* 3*   CREATININE 0.45* 0.38*   GLUCOSE 106* 82     S. Calcium:  Recent Labs     04/18/21  0700   CALCIUM 8.5*     S. Ionized Calcium:No results for input(s): IONCA in the last 72 hours. S. Magnesium:  Recent Labs     04/18/21  0700   MG 1.5*     S. Phosphorus:No results for input(s): PHOS in the last 72 hours. S. Glucose:No results for input(s): POCGLU in the last 72 hours. Glycosylated hemoglobin A1C: No results for input(s): LABA1C in the last 72 hours. INR:   Recent Labs     04/18/21  0700   INR 0.9     Hepatic functions: No results for input(s): ALKPHOS, ALT, AST, PROT, BILITOT, BILIDIR, LABALBU in the last 72 hours. Pancreatic functions:No results for input(s): LACTA, AMYLASE in the last 72 hours. S. Lactic Acid: No results for input(s): LACTA in the last 72 hours. Cardiac enzymes:No results for input(s): CKTOTAL, CKMB, CKMBINDEX, TROPONINI in the last 72 hours. BNP:No results for input(s): BNP in the last 72 hours. Lipid profile: No results for input(s): CHOL, TRIG, HDL, LDLCALC in the last 72 hours. Invalid input(s): LDL  Blood Gases: No results found for: PH, PCO2, PO2, HCO3, O2SAT  Thyroid functions:   Lab Results   Component Value Date    TSH 0.55 01/18/2021            Microbiology:    Cultures during this admission:     Blood cultures:      [] None drawn      [] Negative             []  Positive (Details:  )  Urine Culture:        [] None drawn      [] Negative             []  Positive (Details:  )  Sputum Culture:   [] None drawn       [] Negative             []  Positive (Details:  )         Radiological reports:  CXR    Showed right basilar infiltrate    CT Scans    CT scan of the chest for pulmonary embolism was done earlier in the day and it showed-    1. Acute pulmonary embolism involving the right basal trunk and segmental   branches of the right lower lung lobe. 2. Associated multifocal ill-defined right lower lung lobe consolidation   consistent with congestion associated with pulmonary embolism. 3. Overall moderate clot burden. 4. No CT evidence of right-sided heart strain. 5. 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 6. Mild cardiomegaly. 7. Mild biapical paraseptal emphysema. Chest x-ray of 4/18/2021 showed-  Persistence of the right lower lobe infiltrate with possible pleural effusion. The CT scan of the chest did not show any pleural effusion but showed right lower lobe consolidation.   Upon my review it appears primarily consolidation rather than a pleural effusion given the shape of the infiltrate     Assessment and Plan     Principal Problem:    Right pulmonary embolus (HCC)  Active Problems:    Hypertension    History of blood clots    Smoker    DVT of deep femoral vein, bilateral (HCC)    Chest pain on breathing cancer screening. Thank you for having us involved in the care of your patient. Please call us if you have any questions or concerns.       Anthony Paul M.D.            4/18/2021, 9:51 AM

## 2021-04-18 NOTE — PLAN OF CARE
Problem: Falls - Risk of:  Goal: Will remain free from falls  Description: Will remain free from falls  4/18/2021 0946 by Imani Sanchez RN  Outcome: Ongoing  4/17/2021 2325 by Hayden Mason RN  Outcome: Ongoing  Goal: Absence of physical injury  Description: Absence of physical injury  4/18/2021 0946 by Imani Sanchez RN  Outcome: Ongoing  4/17/2021 2325 by Hayden Mason RN  Outcome: Ongoing     Problem:  Activity Intolerance:  Goal: Ability to tolerate increased activity will improve  Description: Ability to tolerate increased activity will improve  4/18/2021 0946 by Imani Sanchez RN  Outcome: Ongoing  4/17/2021 2325 by Hayden Mason RN  Outcome: Ongoing     Problem: Pain:  Goal: Pain level will decrease  Description: Pain level will decrease  4/18/2021 0946 by Imani Sanchez RN  Outcome: Ongoing  4/17/2021 2325 by Hayden Mason RN  Outcome: Ongoing  Goal: Control of acute pain  Description: Control of acute pain  4/18/2021 0946 by Imani Sanchez RN  Outcome: Ongoing  4/17/2021 2325 by Hayden Mason RN  Outcome: Ongoing  Goal: Control of chronic pain  Description: Control of chronic pain  4/18/2021 0946 by Imani Sanchez RN  Outcome: Ongoing  4/17/2021 2325 by Hayden Mason RN  Outcome: Ongoing

## 2021-04-18 NOTE — PROGRESS NOTES
[facility]  Office: [address], DO, Devora Patel, DO, Henryamaury Ontiveros, DO, Charlesduane Lu Blood, DO, Chaz Pascual MD, William Luna MD, Saba Link MD, Mili Melo MD, Marcella Ly MD, Abby Ramos MD, Pili Lomeli MD, Charlotte Rao MD, Page Worthington, DO, Jean-Paul Morgan MD, Fernando Quezada DO, Юлия Bangura MD,  Hero Espinoza, DO, Naye Martinez MD, Selvin Holland MD, Rey Rebollar MD, Archana Hurd MD, Marylu Talley, Donovan Toledo, CNP, Eduardo De Luna, CNP, Umesh Jacques, CNS, Jazmin Weber, CNP, Delma Slater, CNP, Justin Hartmann, CNP, Salvador Fitzgerald, CNP, Frankie Felder, CNP, Carroll Briggs PA-C, Torito Storm, [facility], Rhys Malik, CNP, Jeananne Goltz, CNP, Jocelyn Beard, CNP, Nancy Lemus, CNP, Miguel Arteaga, CNP, Austen Hawkins, [address] [place] [address]    Progress Note    4/18/2021    11:10 AM    Name:   Diane Tavares  MRN:     [id number]     Acct:      [de-identified]   Room:   2024/2024-01   Day:  1  Admit Date:  4/16/2021 10:30 PM    PCP:   No primary care provider on file. Code Status:  Full Code    Subjective:     C/C:   Chief Complaint   Patient presents with    Back Pain     Right uypper back pain that wraps around. Hurts more upon inspiration. Interval History Status: not changed. Patient seen and examined this morning. Wife is at bedside. Reports that she is still having pains with breathing and some shortness of breath. She is quite tachycardic on exam.  Blood pressure stable. Hypokalemic. Brief History:     Diane Tavares is a 45 y.o. Non-/non  female who presents with Back Pain (Right uypper back pain that wraps around. Hurts more upon inspiration. )   and is admitted to the hospital for the management of Right pulmonary embolus ([address]      Pt is a 45 yr old AA female with PMH DVTs and tobacco who presented to the ED 4/16/21 with chest pain radiating to the back.  She was diagnosed at [facility] in March 2020 with acute on chronic bilat DVTs. She was supposed to do a Lovenox/ Coumadin bridge and follow up. She was not compliant with this regimen. She was also seen Jan 18 2021 at [facility] and found to have bilat LE edema and still had DVTs bilat. She left AMA.     Patient has anxiety and tobacco abuse (  Less than 1/2 pk a day). She is very worried that this chest pain is \"something else\". It's not relieved with Toradol, Morphine, Fentanyl or Roxicodone. She does c/o SOB, no cough, no fever, sore throat.  + nausea. She has never seen a hematologist or had a hypercoag work up as far as she knows.         Review of Systems:     Constitutional:  negative for chills, fevers, sweats  Respiratory: Reports shortness of breath, reports cough, denies wheezing  Cardiovascular: Reports centralized chest pressure; negative for lower extremity edema, palpitations  Gastrointestinal: Reports that she is nauseated this morning, negative for abdominal pain, constipation, diarrhea, vomiting  Neurological:  negative for dizziness, headache    Medications: Allergies:     Allergies   Allergen Reactions    Adhesive Tape Rash       Current Meds:   Scheduled Meds:    potassium bicarb-citric acid  40 mEq Oral BID    potassium chloride  10 mEq Intravenous Q1H    losartan  50 mg Oral Daily    vitamin B-6  50 mg Oral Daily    sodium chloride flush  5-40 mL Intravenous 2 times per day    enoxaparin  1 mg/kg Subcutaneous BID    sennosides-docusate sodium  2 tablet Oral Daily    pantoprazole  40 mg Oral QAM AC    levoFLOXacin  500 mg Oral Daily     Continuous Infusions:    sodium chloride       PRN Meds: iopamidol, sodium chloride flush, sodium chloride, potassium chloride **OR** potassium alternative oral replacement **OR** potassium chloride, magnesium sulfate, [DISCONTINUED] promethazine **OR** ondansetron, polyethylene glycol, nicotine, acetaminophen **OR** acetaminophen, LORazepam, oxyCODONE-acetaminophen **OR** oxyCODONE-acetaminophen, hydrALAZINE, promethazine, morphine **OR** morphine    Data:     Past Medical History:   has a past medical history of DVT (deep vein thrombosis) in pregnancy, H/O blood clots, and Other emphysema ([facility] [address] [facility] Social History:   reports that she has been smoking cigarettes. She has been smoking about 0.20 packs per day. She has never used smokeless tobacco. She reports current alcohol use. She reports current drug use. Drug: Marijuana. Family History:   Family History   Problem Relation Age of Onset    High Blood Pressure Mother     High Blood Pressure Maternal Grandmother        Vitals:  BP (!) 151/100   Pulse 108   Temp 99.1 °F (37.3 °C) (Oral)   Resp (!) 31   Ht 5' 2\" (1.575 m)   Wt 160 lb (72.6 kg)   SpO2 100%   BMI 29.26 kg/m²   Temp (24hrs), Av.1 °F (37.8 °C), Min:99.1 °F (37.3 °C), Max:101.5 °F (38.6 °C)    No results for input(s): POCGLU in the last 72 hours. I/O (24Hr):     Intake/Output Summary (Last 24 hours) at 2021 1110  Last data filed at 2021 1651  Gross per 24 hour   Intake 400 ml   Output    Net 400 ml       Labs:  Hematology:  Recent Labs     21  2256 21  0522 21  0138 21  0700   WBC 12.3*  --  17.0* 16.7*   RBC 4.37  --  3.69* 3.70*   HGB 14.5  --  12.3 12.3   HCT 40.3  --  33.7* 34.7*   MCV 92.2  --  91.3 93.8   MCH 33.2  --  33.3 33.2   MCHC 36.0*  --  36.5* 35.4*   RDW 17.0*  --  16.5* 17.2*     --  251 258   MPV 11.6  --  10.8 11.0   INR  --  0.9  --  0.9   DDIMER 4.88  --   --   --      Chemistry:  Recent Labs     21  2256 21  0700    131*   K 3.4* 2.9*   CL 99 98   CO2 20 21   GLUCOSE 106* 82   BUN 5* 3*   CREATININE 0.45* 0.38*   MG 1.6 1.5*   ANIONGAP 18* 12   LABGLOM >60 >60   GFRAA >60 >60   CALCIUM 10.1 8.5*   TROPHS <6  --    No results for input(s): PROT, LABALBU, LABA1C, D3IYWFV, V9UGEDR, FT4, TSH, AST, ALT, LDH, GGT, ALKPHOS, splenomegaly  Extremities:  no edema, redness, tenderness in the calves  Skin:  no gross lesions, rashes, induration    Assessment:        Hospital Problems           Last Modified POA    * (Principal) Right pulmonary embolus ([address] 4/18/2021 Yes    History of blood clots 4/18/2021 Yes    DVT of deep femoral vein, bilateral ([address] 4/18/2021 Yes    Essential hypertension, benign 4/18/2021 Yes    Smoker 4/18/2021 Yes    Chest pain on breathing 4/18/2021 Yes    Anxiety 4/18/2021 Yes    Non compliance w medication regimen 4/18/2021 Yes    Other emphysema ([address] 4/18/2021 Yes          Plan:        1. Acute pulmonary emboli of the right basal trunk and segmental branches of the right lower lung lobeappreciate pulmonology input. Continue subcutanous Lovenox. Check dosing pricing on Xarelto with her pharmacy, [facility]. Ordered hypercoagulable work-up this morning. Await 2D echo. 2. History of DVT - previously noncompliant with Coumadin. Eliquis made her nauseated. 3. Neutrophilic leukocytosis, tachycardia. Technically meets SIRS criteria. Source of infection is not clearly identified. Currently on levofloxacin. Blood culture with no growth x6 hours. 4. Poor appetitestart Marinol  5. Essential hypertensioncontinue Cozaar 50 mg. May need to increase  6. Continue bowel regimen  7. Hypokalemia - replace orally and via IV today  8. Tobacco abuseencourage cessation  9. Anxietycontinue Ativan  10.  Protonix for GI prophylaxis    JULIANNA WILLARD DO  4/18/2021  11:10 AM

## 2021-04-18 NOTE — FLOWSHEET NOTE
Assessment:  Patient is 45year old female in room 2024. Patient welcomed . Patient was having difficulty breathing. Patient was reluctant to visit with  at first.  Patient hung up cell phone when  entered, voices of children could be heard. Intervention:   was ministry of presence.  asked patient if a prayer would be helpful, patient became very tearful and emotional.   prayed with patient. Outcome:  Patient accepted prayer. Patient successfully expressed emotions and concerns. Plan:  Chaplains will remain available for spiritual and emotional support as needed. 04/18/21 1254   Encounter Summary   Services provided to: Patient   [facility]   Support System Spouse; Children   Continue Visiting   (4/18/2021)   Complexity of Encounter Moderate   Length of Encounter 15 minutes   Spiritual Assessment Completed Yes   Routine   Type Initial   Assessment Approachable;Tearful   Intervention Active listening;Prayer;Sustaining presence/ Ministry of presence   Outcome Expressed gratitude

## 2021-04-18 NOTE — PROGRESS NOTES
IV placed earlier by US. 30 min later Pt. wanted it removed b/c it hurt. Educatexd pt on importance of IV Pt states \"are you gonna remove it or am I\". Writer revoved IV. IV US came back and placed another IV--K+ Restarted--Went back in room K+ terri was turned off- Went to turn it back on and pt refused d/t burning. K+ was running with 0.9% for dilution. Pt educated on the importance of the K+ replacement. Pt wanted it off and is now requesting K+ Effer. K+ Effer given and pt states \"I'll try and drink it\". Pt received about 5mEq total. ---Attn aware.

## 2021-04-19 VITALS
WEIGHT: 160.2 LBS | RESPIRATION RATE: 26 BRPM | BODY MASS INDEX: 29.48 KG/M2 | DIASTOLIC BLOOD PRESSURE: 92 MMHG | HEIGHT: 62 IN | TEMPERATURE: 98.4 F | HEART RATE: 114 BPM | SYSTOLIC BLOOD PRESSURE: 138 MMHG | OXYGEN SATURATION: 100 %

## 2021-04-19 LAB
EKG ATRIAL RATE: 116 BPM
EKG P AXIS: 59 DEGREES
EKG P-R INTERVAL: 164 MS
EKG Q-T INTERVAL: 318 MS
EKG QRS DURATION: 74 MS
EKG QTC CALCULATION (BAZETT): 442 MS
EKG R AXIS: 59 DEGREES
EKG T AXIS: -6 DEGREES
EKG VENTRICULAR RATE: 116 BPM
FACTOR VIII ACTIVITY: 309 % (ref 50–150)
LV EF: 61 %
LVEF MODALITY: NORMAL
MAGNESIUM: 2.3 MG/DL (ref 1.6–2.6)
POTASSIUM SERPL-SCNC: 3.3 MMOL/L (ref 3.7–5.3)
POTASSIUM SERPL-SCNC: 3.9 MMOL/L (ref 3.7–5.3)

## 2021-04-19 PROCEDURE — 93306 TTE W/DOPPLER COMPLETE: CPT

## 2021-04-19 PROCEDURE — 99233 SBSQ HOSP IP/OBS HIGH 50: CPT | Performed by: INTERNAL MEDICINE

## 2021-04-19 PROCEDURE — 6360000002 HC RX W HCPCS: Performed by: NURSE PRACTITIONER

## 2021-04-19 PROCEDURE — 2580000003 HC RX 258: Performed by: NURSE PRACTITIONER

## 2021-04-19 PROCEDURE — 6370000000 HC RX 637 (ALT 250 FOR IP): Performed by: NURSE PRACTITIONER

## 2021-04-19 PROCEDURE — 99239 HOSP IP/OBS DSCHRG MGMT >30: CPT | Performed by: INTERNAL MEDICINE

## 2021-04-19 PROCEDURE — 36415 COLL VENOUS BLD VENIPUNCTURE: CPT

## 2021-04-19 PROCEDURE — 84132 ASSAY OF SERUM POTASSIUM: CPT

## 2021-04-19 PROCEDURE — 83735 ASSAY OF MAGNESIUM: CPT

## 2021-04-19 PROCEDURE — 94761 N-INVAS EAR/PLS OXIMETRY MLT: CPT

## 2021-04-19 PROCEDURE — 6370000000 HC RX 637 (ALT 250 FOR IP): Performed by: INTERNAL MEDICINE

## 2021-04-19 PROCEDURE — 6360000002 HC RX W HCPCS: Performed by: INTERNAL MEDICINE

## 2021-04-19 PROCEDURE — 93010 ELECTROCARDIOGRAM REPORT: CPT | Performed by: INTERNAL MEDICINE

## 2021-04-19 RX ORDER — LEVOFLOXACIN 500 MG/1
500 TABLET, FILM COATED ORAL DAILY
Qty: 5 TABLET | Refills: 0 | Status: SHIPPED | OUTPATIENT
Start: 2021-04-20 | End: 2021-04-25

## 2021-04-19 RX ORDER — OXYCODONE HYDROCHLORIDE AND ACETAMINOPHEN 5; 325 MG/1; MG/1
1 TABLET ORAL EVERY 6 HOURS PRN
Qty: 12 TABLET | Refills: 0 | Status: SHIPPED | OUTPATIENT
Start: 2021-04-19 | End: 2021-04-22

## 2021-04-19 RX ORDER — METOPROLOL TARTRATE 5 MG/5ML
5 INJECTION INTRAVENOUS EVERY 4 HOURS PRN
Status: DISCONTINUED | OUTPATIENT
Start: 2021-04-19 | End: 2021-04-19 | Stop reason: HOSPADM

## 2021-04-19 RX ORDER — LOSARTAN POTASSIUM 50 MG/1
50 TABLET ORAL DAILY
Qty: 30 TABLET | Refills: 0 | Status: SHIPPED | OUTPATIENT
Start: 2021-04-19 | End: 2022-01-29 | Stop reason: SDUPTHER

## 2021-04-19 RX ORDER — POTASSIUM CHLORIDE 20 MEQ/1
20 TABLET, EXTENDED RELEASE ORAL DAILY
Qty: 5 TABLET | Refills: 0 | OUTPATIENT
Start: 2021-04-19 | End: 2022-06-05

## 2021-04-19 RX ADMIN — DRONABINOL 2.5 MG: 2.5 CAPSULE ORAL at 09:06

## 2021-04-19 RX ADMIN — LOSARTAN POTASSIUM 50 MG: 50 TABLET, FILM COATED ORAL at 09:05

## 2021-04-19 RX ADMIN — SODIUM CHLORIDE, PRESERVATIVE FREE 10 ML: 5 INJECTION INTRAVENOUS at 09:07

## 2021-04-19 RX ADMIN — OXYCODONE HYDROCHLORIDE AND ACETAMINOPHEN 2 TABLET: 5; 325 TABLET ORAL at 06:13

## 2021-04-19 RX ADMIN — POTASSIUM CHLORIDE 40 MEQ: 1500 TABLET, EXTENDED RELEASE ORAL at 00:14

## 2021-04-19 RX ADMIN — PANTOPRAZOLE SODIUM 40 MG: 40 TABLET, DELAYED RELEASE ORAL at 09:05

## 2021-04-19 RX ADMIN — POTASSIUM CHLORIDE 40 MEQ: 1500 TABLET, EXTENDED RELEASE ORAL at 06:07

## 2021-04-19 RX ADMIN — MORPHINE SULFATE 4 MG: 4 INJECTION, SOLUTION INTRAMUSCULAR; INTRAVENOUS at 03:55

## 2021-04-19 RX ADMIN — Medication 50 MG: at 09:06

## 2021-04-19 RX ADMIN — OXYCODONE HYDROCHLORIDE AND ACETAMINOPHEN 2 TABLET: 5; 325 TABLET ORAL at 00:15

## 2021-04-19 RX ADMIN — POTASSIUM BICARBONATE 40 MEQ: 782 TABLET, EFFERVESCENT ORAL at 09:06

## 2021-04-19 RX ADMIN — LEVOFLOXACIN 500 MG: 500 TABLET, FILM COATED ORAL at 09:05

## 2021-04-19 RX ADMIN — ENOXAPARIN SODIUM 70 MG: 80 INJECTION SUBCUTANEOUS at 09:06

## 2021-04-19 RX ADMIN — OXYCODONE HYDROCHLORIDE AND ACETAMINOPHEN 2 TABLET: 5; 325 TABLET ORAL at 13:47

## 2021-04-19 RX ADMIN — MAGNESIUM SULFATE HEPTAHYDRATE 1000 MG: 1 INJECTION, SOLUTION INTRAVENOUS at 00:15

## 2021-04-19 ASSESSMENT — PAIN SCALES - GENERAL
PAINLEVEL_OUTOF10: 10
PAINLEVEL_OUTOF10: 8
PAINLEVEL_OUTOF10: 9

## 2021-04-19 ASSESSMENT — ENCOUNTER SYMPTOMS: TACHYPNEA: 1

## 2021-04-19 NOTE — DISCHARGE SUMMARY
Pt left floor ambulatory with all belongings following removal of IV and instructions for d/c. Pt denies questions at this time and was encouraged to call with questions

## 2021-04-19 NOTE — DISCHARGE SUMMARY
[facility]  Office: [address], DO, Ilannick Roldan, DO, Pramod Gallardo, DO, Gali Acuñaryan Dozier, DO, Segun Soliman MD, Bella Mccollum MD, Sathya Zaragoza MD, Willette Klinefelter, MD, Darby Cevallos MD, Clifford Gomez MD, Nieves Garcia MD, Dennys Graves MD, Charles Sandoval DO, Gala Laird MD, Mani Eagle DO, Ronnie Heck MD,  Moe Escobedo DO, Nils Malloy MD, Patricia Corea MD, Barbi Casper MD, Ruap Simmons MD, Negra Allen, [facility], [facility] [place], CNP, Nicholas Hemp, CNP, Zuleika Brunson, CNS, Neda Nicholson, CNP, Mireya Dixon, CNP, Ameya Brumfield, CNP, Leo Virgen, CNP, Mimi Carlson, CNP, Mal Reynoso PA-C, Deejay Gomez, [facility], Joey William, CNP, Jada Colón, CNP, Celso Sage, CNP, Marland Cowden, CNP, Patti Clemons, CNP, Edie Loving, [facility]    Discharge Summary     Patient ID: Richard Fajardo  :  1980   MRN: [id number]     ACCOUNT:  [de-identified]   Patient's PCP: No primary care provider on file. Admit Date: 2021   Discharge Date: 2021    Length of Stay: 2  Code Status:  Full Code  Admitting Physician: Tanya Hernandez DO  Discharge Physician: Janet Schultz DO     Active Discharge Diagnoses:     Hospital Problem Lists:  Principal Problem:    Right pulmonary embolus [facility])  Active Problems:    History of blood clots    DVT of deep femoral vein, bilateral (HCC)    Essential hypertension, benign    Smoker    Chest pain on breathing    Anxiety    Non compliance w medication regimen    Other emphysema (HCC)    Hypokalemia  Resolved Problems:    * No resolved hospital problems. *    Admission Condition:  poor     Discharged Condition: good    Hospital Stay:     Hospital Course:  Richard Fajardo is a 45 y.o.  female who presented to [facility] for evaluation of back pain.  Her comorbiities include a history of prior DVTs (previously noncompliant with coumadin), hypertension, and tobacco abuse. She is noncompliant with medications. In March of 2020, the patient was diagnosed with bilateral LE DVTs. She was noted to have these DVTs in January of 2021, but left AMA prior to treatment. CTA of the chest revealed an acute PE in the right basal trunk and segmental branches of the right lower lung lobe with moderate clot burden. She was started on an IV heparin infusion and admitted to the hospital for further workup. She was noted to be quite tachycardic on initial exam. This improved with time. She was also noted to have an elevated procalcitonin. She was started on levaquin for possible superimposed pneumonia. Xarelto was approved by her pharmacy. She was maintained on cozaar due to hypertension. Her tachycardia improved. Her BP was well controlled. Medical compliance was stressed to the patient. She understand. 2D echo was performed and was WNL. She is appropriate for discharge this afternoon.      Significant therapeutic interventions:   - IV heparin  - Initiation of NOAC  - BP control  - Pain control    Significant Diagnostic Studies:   Labs / Micro:  CBC:   Lab Results   Component Value Date    WBC 16.7 04/18/2021    RBC 3.70 04/18/2021    HGB 12.3 04/18/2021    HCT 34.7 04/18/2021    MCV 93.8 04/18/2021    MCH 33.2 04/18/2021    MCHC 35.4 04/18/2021    RDW 17.2 04/18/2021     04/18/2021     CMP:    Lab Results   Component Value Date    GLUCOSE 89 04/18/2021     04/18/2021    K 3.9 04/19/2021    CL 96 04/18/2021    CO2 21 04/18/2021    BUN 4 04/18/2021    CREATININE 0.45 04/18/2021    ANIONGAP 15 04/18/2021    ALKPHOS 75 01/18/2021    ALT 21 01/18/2021    AST 26 01/18/2021    BILITOT 0.21 01/18/2021    LABALBU 4.1 01/18/2021    ALBUMIN 1.2 01/18/2021    LABGLOM >60 04/18/2021    GFRAA >60 04/18/2021    GFR      04/18/2021    GFR NOT REPORTED 04/18/2021    PROT 7.5 01/18/2021    CALCIUM 9.0 04/18/2021       Radiology:  Ronna Alexis tablet by mouth daily for 5 days  Start taking on: April 20, 2021     oxyCODONE-acetaminophen 5-325 MG per tablet  Commonly known as: Percocet  Take 1 tablet by mouth every 6 hours as needed for Pain for up to 3 days. Intended supply: 3 days. Take lowest dose possible to manage pain     potassium chloride 20 MEQ extended release tablet  Commonly known as: KLOR-CON M  Take 1 tablet by mouth daily for 5 days     * rivaroxaban 15 MG Tabs tablet  Commonly known as: Xarelto  Take 1 tablet by mouth 2 times daily (with meals) for 21 days     * rivaroxaban 20 MG Tabs tablet  Commonly known as: Xarelto  Take 1 tablet by mouth daily (with breakfast)  Start taking on: May 10, 2021         * This list has 2 medication(s) that are the same as other medications prescribed for you. Read the directions carefully, and ask your doctor or other care provider to review them with you. CONTINUE taking these medications    losartan 50 MG tablet  Commonly known as: COZAAR  Take 1 tablet by mouth daily        STOP taking these medications    acetaminophen 500 MG tablet  Commonly known as: TYLENOL     ibuprofen 600 MG tablet  Commonly known as: ADVIL;MOTRIN     Medical Compression Thigh High Misc     warfarin 5 MG tablet  Commonly known as: COUMADIN           Where to Get Your Medications      These medications were sent to [facility], [address] [facility],  [address]  [address]    Phone: [phone]   · levoFLOXacin 500 MG tablet  · losartan 50 MG tablet  · oxyCODONE-acetaminophen 5-325 MG per tablet  · potassium chloride 20 MEQ extended release tablet  · rivaroxaban 15 MG Tabs tablet  · rivaroxaban 20 MG Tabs tablet         No discharge procedures on file. Time Spent on discharge is  43 mins in patient examination, evaluation, counseling as well as medication reconciliation, prescriptions for required medications, discharge plan and follow up.     Electronically signed by   Amanda Goncalves DO  4/19/2021  4:19 PM      Thank you Dr. Mariano Jacob primary care provider on file. for the opportunity to be involved in this patient's care.

## 2021-04-19 NOTE — PLAN OF CARE
Problem: Falls - Risk of:  Goal: Will remain free from falls  Description: Will remain free from falls  4/19/2021 1309 by Lili Everett RN  Outcome: Ongoing  4/18/2021 2349 by James Hudson RN  Outcome: Ongoing  Goal: Absence of physical injury  Description: Absence of physical injury  4/19/2021 1309 by Lili Everett RN  Outcome: Ongoing  4/18/2021 2349 by James Hudson RN  Outcome: Ongoing     Problem:  Activity Intolerance:  Goal: Ability to tolerate increased activity will improve  Description: Ability to tolerate increased activity will improve  4/19/2021 1309 by Lili Everett RN  Outcome: Ongoing  4/18/2021 2349 by James Hudson RN  Outcome: Ongoing     Problem: Pain:  Goal: Pain level will decrease  Description: Pain level will decrease  4/19/2021 1309 by Lili Everett RN  Outcome: Ongoing  4/18/2021 2349 by James Hudson RN  Outcome: Ongoing  Goal: Control of acute pain  Description: Control of acute pain  4/19/2021 1309 by Lili Everett RN  Outcome: Ongoing  4/18/2021 2349 by James Hudson RN  Outcome: Ongoing  Goal: Control of chronic pain  Description: Control of chronic pain  4/19/2021 1309 by Lili Everett RN  Outcome: Ongoing  4/18/2021 2349 by James Hudson RN  Outcome: Ongoing

## 2021-04-19 NOTE — PROGRESS NOTES
[facility]  Office: [address], DO, Ilan Roldan, DO, Pramod Gallardo, DO, Gali Tommyryan Dozier, DO, Segun Soliman MD, Bella Mccollum MD, Sathya Zaragoza MD, Willette Klinefelter, MD, Darby Cevallos MD, Clifford Gomez MD, Nieves Garcia MD, Dennys rGaves MD, Charles Sandoval, DO, Gala Laird MD, Mani Eagle, DO, Ronnie Heck MD,  Moe Escobedo, DO, Nils Malloy MD, Patricia Corea MD, Barbi Casper MD, Rupa Simmons MD, Negra Allen, Bin Koenig, CNP, Nicholas Sawant, CNP, Zuleika Uriarte, CNS, Neda Nicholson, CNP, Mireya Dixon, CNP, Ameya Brumfield, CNP, Leo Virgen, CNP, Mimi Carlson, CNP, Mal Reynoso PA-C, eDejay Gomez, [facility], Joey William, CNP, Jada Colón, CNP, Celso Sage, CNP, Marland Cowden, CNP, Patti Clemons, CNP, Edie Loving, [address] [place] [address]    Progress Note    4/19/2021    11:33 AM    Name:   Richard Fajardo  MRN:     [id number]     Acct:      [de-identified]   Room:   2024/202401   Day:  2  Admit Date:  4/16/2021 10:30 PM    PCP:   No primary care provider on file. Code Status:  Full Code    Subjective:     C/C:   Chief Complaint   Patient presents with    Back Pain     Right uypper back pain that wraps around. Hurts more upon inspiration. Interval History Status: not changed. Patient seen and examined this morning. Family at bedside. She reports that her shortness of breath is improving. Wanting to go back to work. Still tachycardic. Tolerating diet. Discussed with her pharmacy today - she has not filled any prescriptions with their pharmacy since July of last year. Noncompliant with BP meds. Brief History:     Richard Fajardo is a 45 y.o. Non-/non  female who presents with Back Pain (Right uypper back pain that wraps around. Hurts more upon inspiration. )   and is admitted to the hospital for the management of Right pulmonary embolus ([address]      Pt is a 40 yr old AA female with PMH DVTs and tobacco who presented to the ED 4/16/21 with chest pain radiating to the back. She was diagnosed at [facility] in March 2020 with acute on chronic bilat DVTs. She was supposed to do a Lovenox/ Coumadin bridge and follow up. She was not compliant with this regimen. She was also seen Jan 18 2021 at [facility] and found to have bilat LE edema and still had DVTs bilat. She left AMA.     Patient has anxiety and tobacco abuse (  Less than 1/2 pk a day). She is very worried that this chest pain is \"something else\". It's not relieved with Toradol, Morphine, Fentanyl or Roxicodone. She does c/o SOB, no cough, no fever, sore throat.  + nausea. She has never seen a hematologist or had a hypercoag work up as far as she knows.         Review of Systems:     Constitutional:  negative for chills, fevers, sweats  Respiratory: Reports improvement of onset shortness of breath, reports cough, denies wheezing  Cardiovascular: Reports centralized chest pressure; negative for lower extremity edema, palpitations  Gastrointestinal: Reports that she is nauseated this morning, negative for abdominal pain, constipation, diarrhea, vomiting  Neurological:  negative for dizziness, headache    Medications: Allergies:     Allergies   Allergen Reactions    Adhesive Tape Rash       Current Meds:   Scheduled Meds:    potassium bicarb-citric acid  40 mEq Oral BID    dronabinol  2.5 mg Oral BID    potassium chloride  40 mEq Oral BID WC    losartan  50 mg Oral Daily    vitamin B-6  50 mg Oral Daily    sodium chloride flush  5-40 mL Intravenous 2 times per day    enoxaparin  1 mg/kg Subcutaneous BID    sennosides-docusate sodium  2 tablet Oral Daily    pantoprazole  40 mg Oral QAM AC    levoFLOXacin  500 mg Oral Daily     Continuous Infusions:    sodium chloride       PRN Meds: metoprolol, iopamidol, sodium chloride flush, sodium chloride, potassium chloride **OR** potassium alternative oral replacement **OR** potassium chloride, magnesium sulfate, [DISCONTINUED] promethazine **OR** ondansetron, polyethylene glycol, nicotine, acetaminophen **OR** acetaminophen, LORazepam, oxyCODONE-acetaminophen **OR** oxyCODONE-acetaminophen, promethazine, morphine **OR** morphine    Data:     Past Medical History:   has a past medical history of DVT (deep vein thrombosis) in pregnancy, H/O blood clots, and Other emphysema ([address] Social History:   reports that she has been smoking cigarettes. She has been smoking about 0.20 packs per day. She has never used smokeless tobacco. She reports current alcohol use. She reports current drug use. Drug: Marijuana. Family History:   Family History   Problem Relation Age of Onset    High Blood Pressure Mother     High Blood Pressure Maternal Grandmother        Vitals:  /76   Pulse 117   Temp 98.2 °F (36.8 °C) (Oral)   Resp 24   Ht 5' 2\" (1.575 m)   Wt 160 lb 3.2 oz (72.7 kg)   SpO2 98%   BMI 29.30 kg/m²   Temp (24hrs), Av.9 °F (37.2 °C), Min:98.2 °F (36.8 °C), Max:99.9 °F (37.7 °C)    No results for input(s): POCGLU in the last 72 hours. I/O (24Hr):     Intake/Output Summary (Last 24 hours) at 2021 1133  Last data filed at 2021 1644  Gross per 24 hour   Intake 100 ml   Output    Net 100 ml       Labs:  Hematology:  Recent Labs     21  2256 21  0522 21  0138 21  0700 21  1208   WBC 12.3*  --  17.0* 16.7*  --    RBC 4.37  --  3.69* 3.70*  --    HGB 14.5  --  12.3 12.3  --    HCT 40.3  --  33.7* 34.7*  --    MCV 92.2  --  91.3 93.8  --    MCH 33.2  --  33.3 33.2  --    MCHC 36.0*  --  36.5* 35.4*  --    RDW 17.0*  --  16.5* 17.2*  --      --  251 258  --    MPV 11.6  --  10.8 11.0  --    INR  --  0.9  --  0.9 0.9   DDIMER 4.88  --   --   --   --      Chemistry:  Recent Labs     21  2256 21  0700 21  1208 21  0712    131* 132*  --    K 3.4* 2.9* 2.7* 3.3*   CL 99 98 96*  --    CO2 20 21 21  --    GLUCOSE 106* 82 89  --    BUN 5* 3* 4*  --    CREATININE 0.45* 0.38* 0.45*  --    MG 1.6 1.5* 1.5* 2.3   ANIONGAP 18* 12 15  --    LABGLOM >60 >60 >60  --    GFRAA >60 >60 >60  --    CALCIUM 10.1 8.5* 9.0  --    TROPHS <6  --   --   --    No results for input(s): PROT, LABALBU, LABA1C, I0TAMAY, Y4FVTUV, FT4, TSH, AST, ALT, LDH, GGT, ALKPHOS, LABGGT, BILITOT, BILIDIR, AMMONIA, AMYLASE, LIPASE, LACTATE, CHOL, HDL, LDLCHOLESTEROL, CHOLHDLRATIO, TRIG, VLDL, MEE08BL, PHENYTOIN, PHENYF, URICACID, POCGLU in the last 72 hours. ABG:  Lab Results   Component Value Date    FIO2 NOT REPORTED 03/30/2020     Lab Results   Component Value Date/Time    SPECIAL 17ML R HAND 04/18/2021 01:30 AM     Lab Results   Component Value Date/Time    CULTURE NO GROWTH 1 DAY 04/18/2021 01:30 AM       Radiology:  Health system Chest Portable    Result Date: 4/16/2021  Opacification of the inferior 1/2 of the right hemithorax likely represents a combination of middle lobe and right lower lobe consolidation and possible right pleural fluid. Follow-up CT chest recommended. Ct Chest Pulmonary Embolism W Contrast    Result Date: 4/17/2021  1. Acute pulmonary embolism involving the right basal trunk and segmental branches of the right lower lung lobe. 2. Associated multifocal ill-defined right lower lung lobe consolidation consistent with congestion associated with pulmonary embolism. 3. Overall moderate clot burden. 4. No CT evidence of right-sided heart strain. 5. 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 6. Mild cardiomegaly. 7. Mild biapical paraseptal emphysema. 8. Note: Study limited by patient breathing motion related artifact. Critical results were called by Dr. Rom Johnson to Ramila yMers on 4/17/2021 at 04:26.        Physical Examination:        General appearance:  alert, cooperative and no distress, sitting up in bed  Mental Status:  oriented to person, place and time and normal affect  HEENT: Sclera are anti-icteric, conjunctive are not injected, and simple cannula in place  Lungs:  clear to auscultation bilaterally, occasional scattered rhonchi/crackles which improved with cough. Cough is involved by deep breathing, normal effort  Heart:  tachycardia with regular rhythm, no murmur  Abdomen:  soft, nontender, nondistended, normal bowel sounds, no masses, hepatomegaly, splenomegaly  Extremities:  no edema, redness, tenderness in the calves  Skin:  no gross lesions, rashes, induration    Assessment:        Hospital Problems           Last Modified POA    * (Principal) Right pulmonary embolus ([address] 4/18/2021 Yes    History of blood clots 4/18/2021 Yes    DVT of deep femoral vein, bilateral ([address] 4/18/2021 Yes    Essential hypertension, benign 4/18/2021 Yes    Smoker 4/18/2021 Yes    Chest pain on breathing 4/18/2021 Yes    Anxiety 4/18/2021 Yes    Non compliance w medication regimen 4/18/2021 Yes    Other emphysema ([address] 4/18/2021 Yes    Hypokalemia 4/18/2021 Yes          Plan:        1. Acute pulmonary emboli of the right basal trunk and segmental branches of the right lower lung lobe  appreciate pulmonology input. Xarelto approved. Hypercoaguable workup ongoing. Await 2D echo. 2. History of DVT - previously noncompliant with Coumadin. Eliquis made her nauseated. Xarelto approved. 3. Neutrophilic leukocytosis, tachycardia. Procalcitonin elevated. Continue levaquin. Blood culture with no growth. 4. Poor appetite - improved  5. Essential hypertensioncontinue Cozaar 50 mg.  6. Continue bowel regimen  7. Hypokalemia - replace orally for the next 2 days   8. Tobacco abuseencourage cessation  9. Anxietycontinue Ativan  10. Protonix for GI prophylaxis  11. Dispo: discharge if echo read OK.     [address] Vickers DO  4/19/2021  11:33 AM

## 2021-04-19 NOTE — PLAN OF CARE
Problem: Falls - Risk of:  Goal: Will remain free from falls  Description: Will remain free from falls  Outcome: Ongoing  Goal: Absence of physical injury  Description: Absence of physical injury  Outcome: Ongoing     Problem:  Activity Intolerance:  Goal: Ability to tolerate increased activity will improve  Description: Ability to tolerate increased activity will improve  Outcome: Ongoing     Problem: Pain:  Goal: Pain level will decrease  Description: Pain level will decrease  Outcome: Ongoing  Goal: Control of acute pain  Description: Control of acute pain  Outcome: Ongoing  Goal: Control of chronic pain  Description: Control of chronic pain  Outcome: Ongoing

## 2021-04-19 NOTE — PROGRESS NOTES
Occupational Therapy    Occupational Therapy Not Seen Note    DATE: 2021  Name: Neptali Melendrez  : 1980  MRN: [id number]    Patient not available for Occupational Therapy due to:    Pt independent with ADLs and functional tasks at this time.  Pt with no OT acute care needs, will defer OT eval.    Next Scheduled Treatment: N/A    Electronically signed by Hilary Serrano OT on 2021 at 3:09 PM

## 2021-04-19 NOTE — CARE COORDINATION
Discharge [facility] Case Management Department  Written by: Ronda Hernandez RN    Patient Name: Kelsea Longo  Attending Provider: No att. providers found  Admit Date: 2021 10:30 PM  MRN: [id number]  Account: [de-identified]                     : 1980  Discharge Date: 2021      Disposition: home    Ronda Hernandez RN

## 2021-04-20 LAB
ANTICARDIOLIPIN IGA ANTIBODY: 5.3 APL (ref 0–14)
ANTICARDIOLIPIN IGG ANTIBODY: 1.3 GPL (ref 0–10)
BETA 2 GLYCOPROT.1 IGA AB: 2.1 ELISA U/ML (ref 0–7)
BETA 2 GLYCOPROT.1 IGG AB: 0.9 ELISA U/ML (ref 0–7)
BETA 2 GLYCOPROT.1 IGM AB: <0.9 ELISA U/ML (ref 0–7)
CARDIOLIPIN AB IGM: 1.1 MPL (ref 0–10)
DILUTE RUSSELL VIPER VENOM TIME: ABNORMAL
INR BLD: 0.9
LUPUS ANTICOAG: ABNORMAL
PARTIAL THROMBOPLASTIN TIME: 38.5 SEC (ref 20.5–30.5)
PROTHROMBIN TIME: 9.9 SEC (ref 9.1–12.3)

## 2021-04-22 LAB
AT-III ACTIVITY: 92 % (ref 83–122)
PROTEIN C ACTIVITY: 92 %
PROTEIN S ACTIVITY: 101 % (ref 59–130)
PROTHROMBIN G20210A MUTATION: NEGATIVE
PT PCR SPECIMEN: NORMAL

## 2021-04-24 LAB
CULTURE: NORMAL
Lab: NORMAL
SPECIMEN DESCRIPTION: NORMAL

## 2021-04-26 LAB
FACTOR V MUTATION: NEGATIVE
SPECIMEN: NORMAL

## 2021-04-26 ASSESSMENT — ENCOUNTER SYMPTOMS
WHEEZING: 0
TROUBLE SWALLOWING: 0
COUGH: 1
SORE THROAT: 0
NAUSEA: 0
CONSTIPATION: 0
VOMITING: 0
DIARRHEA: 0
SHORTNESS OF BREATH: 1
ABDOMINAL PAIN: 0

## 2021-12-30 ENCOUNTER — HOSPITAL ENCOUNTER (EMERGENCY)
Age: 41
Discharge: LEFT BEFORE DISCHARGE | End: 2021-12-30

## 2021-12-30 ENCOUNTER — HOSPITAL ENCOUNTER (EMERGENCY)
Age: 41
Discharge: HOME | End: 2021-12-30
Attending: EMERGENCY MEDICINE
Payer: MEDICARE

## 2021-12-30 VITALS
SYSTOLIC BLOOD PRESSURE: 158 MMHG | TEMPERATURE: 96.6 F | OXYGEN SATURATION: 92 % | DIASTOLIC BLOOD PRESSURE: 98 MMHG | HEART RATE: 92 BPM | RESPIRATION RATE: 12 BRPM

## 2021-12-30 DIAGNOSIS — Z48.02 VISIT FOR SUTURE REMOVAL: Primary | ICD-10-CM

## 2021-12-30 DIAGNOSIS — K08.89 PAIN, DENTAL: ICD-10-CM

## 2021-12-30 PROCEDURE — 6370000000 HC RX 637 (ALT 250 FOR IP): Performed by: STUDENT IN AN ORGANIZED HEALTH CARE EDUCATION/TRAINING PROGRAM

## 2021-12-30 PROCEDURE — 99283 EMERGENCY DEPT VISIT LOW MDM: CPT

## 2021-12-30 RX ORDER — IBUPROFEN 200 MG
400 TABLET ORAL EVERY 6 HOURS PRN
Qty: 60 TABLET | Refills: 0 | Status: SHIPPED | OUTPATIENT
Start: 2021-12-30 | End: 2022-04-14

## 2021-12-30 RX ORDER — ACETAMINOPHEN 325 MG/1
650 TABLET ORAL EVERY 6 HOURS PRN
Qty: 60 TABLET | Refills: 0 | Status: SHIPPED | OUTPATIENT
Start: 2021-12-30 | End: 2022-06-05

## 2021-12-30 RX ORDER — IBUPROFEN 400 MG/1
400 TABLET ORAL ONCE
Status: COMPLETED | OUTPATIENT
Start: 2021-12-30 | End: 2021-12-30

## 2021-12-30 RX ORDER — ACETAMINOPHEN 325 MG/1
650 TABLET ORAL ONCE
Status: COMPLETED | OUTPATIENT
Start: 2021-12-30 | End: 2021-12-30

## 2021-12-30 RX ADMIN — ACETAMINOPHEN 650 MG: 325 TABLET ORAL at 13:29

## 2021-12-30 RX ADMIN — IBUPROFEN 400 MG: 400 TABLET, FILM COATED ORAL at 13:29

## 2021-12-30 ASSESSMENT — PAIN SCALES - GENERAL
PAINLEVEL_OUTOF10: 10
PAINLEVEL_OUTOF10: 10

## 2021-12-30 ASSESSMENT — PAIN DESCRIPTION - ORIENTATION: ORIENTATION: RIGHT

## 2021-12-30 ASSESSMENT — PAIN DESCRIPTION - LOCATION: LOCATION: JAW

## 2021-12-30 ASSESSMENT — ACTIVITIES OF DAILY LIVING (ADL): EFFECT OF PAIN ON DAILY ACTIVITIES: DIFFICULTY EATING

## 2021-12-30 ASSESSMENT — PAIN DESCRIPTION - FREQUENCY: FREQUENCY: CONTINUOUS

## 2021-12-30 ASSESSMENT — PAIN DESCRIPTION - DESCRIPTORS: DESCRIPTORS: ACHING;THROBBING
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
## 2021-12-30 NOTE — ED NOTES
Bed: 43  Expected date:   Expected time:   Means of arrival:   Comments:     Rebecca Hudson RN  12/30/21 1020

## 2021-12-30 NOTE — ED PROVIDER NOTES
[facility]     Emergency Department     Faculty Note/ Attestation      Pt Name: Jose Mann                                       MRN: [id number]  [place] 1980  Date of evaluation: 12/30/2021    Patients PCP:    No primary care provider on file. Attestation  I performed a history and physical examination of the patient and discussed management with the resident. I reviewed the residents note and agree with the documented findings and plan of care. Any areas of disagreement are noted on the chart. I was personally present for the key portions of any procedures. I have documented in the chart those procedures where I was not present during the key portions. I have reviewed the emergency nurses triage note. I agree with the chief complaint, past medical history, past surgical history, allergies, medications, social and family history as documented unless otherwise noted below. For Physician Assistant/ Nurse Practitioner cases/documentation I have personally evaluated this patient and have completed at least one if not all key elements of the E/M (history, physical exam, and MDM). Additional findings are as noted. Initial Screens:             Vitals:    Vitals:    12/30/21 1208   BP: (!) 158/98   Pulse: 92   Resp: 12   Temp: 96.6 °F (35.9 °C)   TempSrc: Temporal   SpO2: 92%       CHIEF COMPLAINT       Chief Complaint   Patient presents with    Jaw Pain    Suture / Staple Removal       Patient is a 45-year-old female who arrives for jaw suture removal patient has no signs of infection redness warmth erythema will need suture removal        EMERGENCY DEPARTMENT COURSE:     -------------------------  BP: (!) 158/98, Temp: 96.6 °F (35.9 °C), Pulse: 92, Resp: 12    Tyler Lora DO,, , RDMS.   Attending Emergency Physician          Ursula Menjivar DO  12/30/21 134

## 2021-12-30 NOTE — ED NOTES
Pt reports to ED for suture removal from Right cheek and also jaw pain x 1 week. Patient reports jaw pain is 10/10 and it radiating into R ear.      Kaylie Serrano LPN  85/42/99 [id number]

## 2022-01-29 ENCOUNTER — HOSPITAL ENCOUNTER (EMERGENCY)
Age: 42
Discharge: HOME | End: 2022-01-29
Attending: EMERGENCY MEDICINE
Payer: MEDICARE

## 2022-01-29 VITALS
BODY MASS INDEX: 30.36 KG/M2 | DIASTOLIC BLOOD PRESSURE: 97 MMHG | TEMPERATURE: 97 F | HEIGHT: 62 IN | HEART RATE: 94 BPM | OXYGEN SATURATION: 100 % | SYSTOLIC BLOOD PRESSURE: 151 MMHG | RESPIRATION RATE: 18 BRPM | WEIGHT: 165 LBS

## 2022-01-29 DIAGNOSIS — I10 HYPERTENSION, UNSPECIFIED TYPE: ICD-10-CM

## 2022-01-29 DIAGNOSIS — R51.9 ACUTE NONINTRACTABLE HEADACHE, UNSPECIFIED HEADACHE TYPE: Primary | ICD-10-CM

## 2022-01-29 PROCEDURE — 96372 THER/PROPH/DIAG INJ SC/IM: CPT

## 2022-01-29 PROCEDURE — 6370000000 HC RX 637 (ALT 250 FOR IP): Performed by: STUDENT IN AN ORGANIZED HEALTH CARE EDUCATION/TRAINING PROGRAM

## 2022-01-29 PROCEDURE — 99283 EMERGENCY DEPT VISIT LOW MDM: CPT

## 2022-01-29 PROCEDURE — 6360000002 HC RX W HCPCS: Performed by: STUDENT IN AN ORGANIZED HEALTH CARE EDUCATION/TRAINING PROGRAM

## 2022-01-29 RX ORDER — KETOROLAC TROMETHAMINE 30 MG/ML
30 INJECTION, SOLUTION INTRAMUSCULAR; INTRAVENOUS ONCE
Status: COMPLETED | OUTPATIENT
Start: 2022-01-29 | End: 2022-01-29

## 2022-01-29 RX ORDER — LOSARTAN POTASSIUM 50 MG/1
50 TABLET ORAL ONCE
Status: COMPLETED | OUTPATIENT
Start: 2022-01-29 | End: 2022-01-29

## 2022-01-29 RX ORDER — LOSARTAN POTASSIUM 50 MG/1
50 TABLET ORAL DAILY
Qty: 60 TABLET | Refills: 0 | Status: SHIPPED | OUTPATIENT
Start: 2022-01-29 | End: 2022-06-05 | Stop reason: SDUPTHER

## 2022-01-29 RX ORDER — LOSARTAN POTASSIUM 25 MG/1
25 TABLET ORAL DAILY
Status: DISCONTINUED | OUTPATIENT
Start: 2022-01-29 | End: 2022-01-29

## 2022-01-29 RX ADMIN — LOSARTAN POTASSIUM 50 MG: 50 TABLET, FILM COATED ORAL at 11:57

## 2022-01-29 RX ADMIN — KETOROLAC TROMETHAMINE 30 MG: 30 INJECTION, SOLUTION INTRAMUSCULAR at 11:16

## 2022-01-29 ASSESSMENT — PAIN DESCRIPTION - PROGRESSION: CLINICAL_PROGRESSION: GRADUALLY WORSENING

## 2022-01-29 ASSESSMENT — ENCOUNTER SYMPTOMS
ABDOMINAL PAIN: 0
SHORTNESS OF BREATH: 0
WHEEZING: 0
COUGH: 0

## 2022-01-29 ASSESSMENT — PAIN DESCRIPTION - DESCRIPTORS: DESCRIPTORS: ACHING;HEADACHE

## 2022-01-29 ASSESSMENT — PAIN DESCRIPTION - PAIN TYPE: TYPE: ACUTE PAIN

## 2022-01-29 ASSESSMENT — PAIN DESCRIPTION - LOCATION: LOCATION: HEAD

## 2022-01-29 ASSESSMENT — PAIN DESCRIPTION - ONSET: ONSET: ON-GOING

## 2022-01-29 ASSESSMENT — PAIN SCALES - GENERAL
PAINLEVEL_OUTOF10: 8
PAINLEVEL_OUTOF10: 8

## 2022-01-29 ASSESSMENT — PAIN DESCRIPTION - FREQUENCY: FREQUENCY: CONTINUOUS

## 2022-01-29 NOTE — ED PROVIDER NOTES
[facility] ED  Emergency Department Encounter  EmergencyMedicine Resident     Pt Name:Natalia Mendez  MRN: [id number]  [place] 1980  Date of evaluation: 22  PCP:  No primary care provider on file. CHIEF COMPLAINT       Chief Complaint   Patient presents with    Headache     started a few days ago    Fatigue       HISTORY OF PRESENT ILLNESS  (Location/Symptom, Timing/Onset, Context/Setting, Quality, Duration, Modifying Factors, Severity.)      Twan Pastor is a 37 y.o. female who presents with headache which began 2 days ago. Patient reports that she believes her blood pressure may be elevated as she normally gets headaches when she has high blood pressure. States she has not checked her blood pressure at home but it was elevated at the dentist office 2 days ago. Also reporting generalized fatigue. Denies any recent sick contacts. No known COVID-19 exposure. Reports she is up-to-date on COVID-19 vaccines. No reported visual disturbance, numbness, weakness or other complaints besides headache and fatigue at this time    PAST MEDICAL / SURGICAL / SOCIAL / FAMILY HISTORY      has a past medical history of DVT (deep vein thrombosis) in pregnancy, H/O blood clots, and Other emphysema ([facility] [address] has a past surgical history that includes  section (, ) and Tubal ligation (). Social History     Socioeconomic History    Marital status: Single     Spouse name: Not on file    Number of children: Not on file    Years of education: Not on file    Highest education level: Not on file   Occupational History    Not on file   Tobacco Use    Smoking status: Current Every Day Smoker     Packs/day: 0.20     Types: Cigarettes    Smokeless tobacco: Never Used   Substance and Sexual Activity    Alcohol use:  Yes     Alcohol/week: 0.0 standard drinks     Comment: socially     Drug use: Yes     Types: Marijuana Devon Franco     Comment: socially    Sexual activity: Yes     Partners: Male   Other Topics Concern    Not on file   Social History Narrative    Not on file     Social Determinants of Health     Financial Resource Strain:     Difficulty of Paying Living Expenses: Not on file   Food Insecurity:     Worried About [address] in the Last Year: Not on file    Steven of Food in the Last Year: Not on file   Transportation Needs:     Lack of Transportation (Medical): Not on file    Lack of Transportation (Non-Medical): Not on file   Physical Activity:     Days of Exercise per Week: Not on file    Minutes of Exercise per Session: Not on file   Stress:     Feeling of Stress : Not on file   Social Connections:     Frequency of Communication with Friends and Family: Not on file    Frequency of Social Gatherings with Friends and Family: Not on file    Attends Yazdanism Services: Not on file    Active Member of [address] [place] [address] or Organizations: Not on file    Attends Club or Organization Meetings: Not on file    Marital Status: Not on file   Intimate Partner Violence:     Fear of Current or Ex-Partner: Not on file    Emotionally Abused: Not on file    Physically Abused: Not on file    Sexually Abused: Not on file   Housing Stability:     Unable to Pay for Housing in the Last Year: Not on file    Number of [place] in the Last Year: Not on file    Unstable Housing in the Last Year: Not on file       Family History   Problem Relation Age of Onset    High Blood Pressure Mother     High Blood Pressure Maternal Grandmother        Allergies:  Adhesive tape    Home Medications:  Prior to Admission medications    Medication Sig Start Date End Date Taking?  Authorizing Provider   losartan (COZAAR) 50 MG tablet Take 1 tablet by mouth daily 1/29/22  Yes Dorian Negrete DO   acetaminophen (TYLENOL) 325 MG tablet Take 2 tablets by mouth every 6 hours as needed for Pain 12/30/21   Ponce Escalona MD   ibuprofen (ADVIL;MOTRIN) 200 MG tablet Take 2 tablets by mouth every 6 hours as needed for Pain or Fever 12/30/21   Bandar Sena MD   potassium chloride (KLOR-CON M) 20 MEQ extended release tablet Take 1 tablet by mouth daily for 5 days 4/19/21 4/24/21  Khai Scott, DO   rivaroxaban (XARELTO) 15 MG TABS tablet Take 1 tablet by mouth 2 times daily (with meals) for 21 days 4/18/21 5/9/21  Khai Scott, DO   rivaroxaban (XARELTO) 20 MG TABS tablet Take 1 tablet by mouth daily (with breakfast) 5/10/21   Khai Scott, DO       REVIEW OF SYSTEMS    (2-9 systems for level 4, 10 or more for level 5)      Review of Systems   Constitutional: Positive for fatigue. Negative for fever. Respiratory: Negative for cough, shortness of breath and wheezing. Cardiovascular: Negative for chest pain. Gastrointestinal: Negative for abdominal pain. Genitourinary: Negative for dysuria and flank pain. Musculoskeletal: Negative for neck pain. Skin: Negative for rash. Neurological: Positive for headaches. Negative for dizziness, weakness and numbness. Psychiatric/Behavioral: Negative for confusion. PHYSICAL EXAM   (up to 7 for level 4, 8 or more for level 5)      INITIAL VITALS:   BP (!) 151/97   Pulse 94   Temp 97 °F (36.1 °C) (Oral)   Resp 18   Ht 5' 2\" (1.575 m)   Wt 165 lb (74.8 kg)   LMP 01/29/2020   SpO2 100%   BMI 30.18 kg/m²     Physical Exam  Constitutional:       General: She is not in acute distress. Appearance: She is not toxic-appearing. HENT:      Head: Normocephalic and atraumatic. Cardiovascular:      Rate and Rhythm: Normal rate. Heart sounds: No murmur heard. No friction rub. No gallop. Pulmonary:      Effort: No respiratory distress. Breath sounds: No wheezing, rhonchi or rales. Abdominal:      General: There is no distension. Tenderness: There is no abdominal tenderness. There is no guarding. Musculoskeletal:      Right lower leg: No edema. Left lower leg: No edema.    Neurological:      Mental Status: She is alert. Motor: No weakness. Gait: Gait normal.         DIFFERENTIAL  DIAGNOSIS     PLAN (LABS / IMAGING / EKG):  No orders of the defined types were placed in this encounter. MEDICATIONS ORDERED:  Orders Placed This Encounter   Medications    DISCONTD: losartan (COZAAR) tablet 25 mg    ketorolac (TORADOL) injection 30 mg    losartan (COZAAR) tablet 50 mg    losartan (COZAAR) 50 MG tablet     Sig: Take 1 tablet by mouth daily     Dispense:  60 tablet     Refill:  0       DDX: Hypertensive headache, migraine, hypertensive urgency, sinus pressure    DIAGNOSTIC RESULTS / EMERGENCY DEPARTMENT COURSE / MDM   LAB RESULTS:  No results found for this visit on 01/29/22. IMPRESSION: 46-year-old female no acute distress presenting with headache. Concerns were hypertension. Patient had a pressure of 151/97 on arrival.  No focal neuro deficits. Not worst headache of her life and was not sudden onset. No indication for imaging at this time. Patient states that she has not taken her antihypertensives today. Plan for administration of losartan. Will treat headache symptomatically with Toradol. Likely discharge if symptoms improved on reevaluation. EMERGENCY DEPARTMENT COURSE:  Given Cozaar, Toradol in emergency department. On reevaluation patient stating that her headache is significantly improved. Patient currently out of her antihypertensives at home. Given prescription for refill of Cozaar. Give instructions follow-up with her primary care physician was also given contact information for [facility] family practice clinic for follow-up. Given ED return precautions and follow-up instructions. She verbalized understanding of and agreement with the discharge plan. PROCEDURES:  None    CONSULTS:  None    CRITICAL CARE:  Please see attending note    FINAL IMPRESSION      1. Acute nonintractable headache, unspecified headache type    2.  Hypertension, unspecified type DISPOSITION / PLAN     DISPOSITION Decision To Discharge 01/29/2022 12:09:43 PM      PATIENT REFERRED TO:  [address] [id number] [phone]  Schedule an appointment as soon as possible for a visit   For re-evaluation and blood pressure management      DISCHARGE MEDICATIONS:  Current Discharge Medication List          Frederick Lindsey DO  Emergency Medicine Resident    (Please note that portions of thisnote were completed with a voice recognition program.  Efforts were made to edit the dictations but occasionally words are mis-transcribed.)        Frederick Lindsey DO  Resident  01/29/22 5108

## 2022-01-29 NOTE — ED PROVIDER NOTES
[facility]     Emergency Department     Faculty Attestation    I performed a history and physical examination of the patient and discussed management with the resident. I have reviewed and agree with the residents findings including all diagnostic interpretations, and treatment plans as written at the time of my review. Any areas of disagreement are noted on the chart. I was personally present for the key portions of any procedures. I have documented in the chart those procedures where I was not present during the key portions. For Physician Assistant/ Nurse Practitioner cases/documentation I have personally evaluated this patient and have completed at least one if not all key elements of the E/M (history, physical exam, and MDM). Additional findings are as noted. This patient was evaluated in the Emergency Department for symptoms described in the history of present illness. The patient was evaluated in the context of the global COVID-19 pandemic, which necessitated consideration that the patient might be at risk for infection with the SARS-CoV-2 virus that causes COVID-19. Institutional protocols and algorithms that pertain to the evaluation of patients at risk for COVID-19 are in a state of rapid change based on information released by regulatory bodies including the CDC and federal and state organizations. These policies and algorithms were followed during the patient's care in the ED. Primary Care Physician: No primary care provider on file. History: This is a 37 y.o. female who presents to the Emergency Department with complaint of headache, hypertension, fatigue. Patient is noncompliant with her hypertensive medication. Patient states for the last several days she has been having headache. Her normally the headache would resolve with Tylenol now is not resolving with Tylenol. She denies any blurred vision or diplopia.   She denies any numbness, tingling or weakness. This was not a sudden onset headache and not the worst headache of her life. The patient states she has a follow-up appoint with her primary care doctor on Tuesday. Physical:   height is 5' 2\" (1.575 m) and weight is 165 lb (74.8 kg). Her oral temperature is 97 °F (36.1 °C). Her blood pressure is 151/97 (abnormal) and her pulse is 94. Her respiration is 18 and oxygen saturation is 100%. Patient has no facial asymmetry no nuchal rigidity, lungs are clear to auscultation bilateral, heart regular rhythm, patient moves all extremities well. Impression: Hypertension, cephalgia, medication noncompliance    Plan: Analgesia, hypertensive medication keep appointment with your primary care physician. (Please note that portions of this note were completed with a voice recognition program.  Efforts were made to edit the dictations but occasionally words are mis-transcribed.)    Ronald Fraga.  Helene Stanley MD, [facility]  Attending Emergency Medicine Physician        Priti Fortune MD  01/29/22 7929

## 2022-04-14 ENCOUNTER — HOSPITAL ENCOUNTER (EMERGENCY)
Age: 42
Discharge: HOME | End: 2022-04-14
Attending: EMERGENCY MEDICINE
Payer: MEDICARE

## 2022-04-14 VITALS
BODY MASS INDEX: 30.36 KG/M2 | HEIGHT: 62 IN | TEMPERATURE: 99 F | RESPIRATION RATE: 18 BRPM | WEIGHT: 165 LBS | DIASTOLIC BLOOD PRESSURE: 102 MMHG | SYSTOLIC BLOOD PRESSURE: 156 MMHG | HEART RATE: 85 BPM | OXYGEN SATURATION: 100 %

## 2022-04-14 DIAGNOSIS — K08.89 PAIN, DENTAL: Primary | ICD-10-CM

## 2022-04-14 DIAGNOSIS — K05.20 ACUTE PERICORONITIS: ICD-10-CM

## 2022-04-14 PROCEDURE — 99283 EMERGENCY DEPT VISIT LOW MDM: CPT

## 2022-04-14 PROCEDURE — 6370000000 HC RX 637 (ALT 250 FOR IP): Performed by: STUDENT IN AN ORGANIZED HEALTH CARE EDUCATION/TRAINING PROGRAM

## 2022-04-14 RX ORDER — PENICILLIN V POTASSIUM 500 MG/1
500 TABLET ORAL 4 TIMES DAILY
Qty: 28 TABLET | Refills: 0 | Status: SHIPPED | OUTPATIENT
Start: 2022-04-14 | End: 2022-04-21

## 2022-04-14 RX ORDER — IBUPROFEN 800 MG/1
800 TABLET ORAL ONCE
Status: COMPLETED | OUTPATIENT
Start: 2022-04-14 | End: 2022-04-14

## 2022-04-14 RX ORDER — PENICILLIN V POTASSIUM 250 MG/1
500 TABLET ORAL ONCE
Status: COMPLETED | OUTPATIENT
Start: 2022-04-14 | End: 2022-04-14

## 2022-04-14 RX ORDER — IBUPROFEN 800 MG/1
800 TABLET ORAL EVERY 8 HOURS PRN
Qty: 30 TABLET | Refills: 0 | Status: SHIPPED | OUTPATIENT
Start: 2022-04-14 | End: 2022-06-05 | Stop reason: SDUPTHER

## 2022-04-14 RX ADMIN — Medication: at 21:31

## 2022-04-14 RX ADMIN — IBUPROFEN 800 MG: 800 TABLET, FILM COATED ORAL at 22:30

## 2022-04-14 RX ADMIN — PENICILLIN V POTASSIUM 500 MG: 250 TABLET ORAL at 20:54

## 2022-04-14 ASSESSMENT — ENCOUNTER SYMPTOMS
ABDOMINAL PAIN: 0
NAUSEA: 0
VOMITING: 0
SHORTNESS OF BREATH: 0
EYE REDNESS: 0
SORE THROAT: 0
PHOTOPHOBIA: 0
COUGH: 0

## 2022-04-14 ASSESSMENT — PAIN SCALES - GENERAL: PAINLEVEL_OUTOF10: 8

## 2022-04-15 NOTE — ED NOTES
DOMONIQUE consulted for appointment to [address] [place] [address]. Appointment set for 4-18-22 at 3:00 P. M. All necessary forms faxed.        MIKIE Weir  04/14/22 0636

## 2022-04-15 NOTE — ED NOTES
SW recalled cab for pt waiting in lobby after pt waited 3 hours. [employer] utilized and they report  A  will provide transportation per [employer] worker.       MIKIE Lloyd  04/14/22 9736

## 2022-04-15 NOTE — ED PROVIDER NOTES
Substance and Sexual Activity    Alcohol use: Yes     Alcohol/week: 0.0 standard drinks     Comment: socially     Drug use: Yes     Types: Marijuana Yuvallie Axe)     Comment: socially    Sexual activity: Yes     Partners: Male   Other Topics Concern    Not on file   Social History Narrative    Not on file     Social Determinants of Health     Financial Resource Strain:     Difficulty of Paying Living Expenses: Not on file   Food Insecurity:     Worried About Running Out of Food in the Last Year: Not on file    Steven of Food in the Last Year: Not on file   Transportation Needs:     Lack of Transportation (Medical): Not on file    Lack of Transportation (Non-Medical): Not on file   Physical Activity:     Days of Exercise per Week: Not on file    Minutes of Exercise per Session: Not on file   Stress:     Feeling of Stress : Not on file   Social Connections:     Frequency of Communication with Friends and Family: Not on file    Frequency of Social Gatherings with Friends and Family: Not on file    Attends Mormonism Services: Not on file    Active Member of [address] [place] [address] or Organizations: Not on file    Attends Club or Organization Meetings: Not on file    Marital Status: Not on file   Intimate Partner Violence:     Fear of Current or Ex-Partner: Not on file    Emotionally Abused: Not on file    Physically Abused: Not on file    Sexually Abused: Not on file   Housing Stability:     Unable to Pay for Housing in the Last Year: Not on file    Number of [place] in the Last Year: Not on file    Unstable Housing in the Last Year: Not on file       Family History   Problem Relation Age of Onset    High Blood Pressure Mother     High Blood Pressure Maternal Grandmother        Allergies:  Adhesive tape    Home Medications:  Prior to Admission medications    Medication Sig Start Date End Date Taking?  Authorizing Provider   losartan (COZAAR) 50 MG tablet Take 1 tablet by mouth daily 1/29/22   Nitish Koehler DO   acetaminophen (TYLENOL) 325 MG tablet Take 2 tablets by mouth every 6 hours as needed for Pain 12/30/21   Ashwini Harrington MD   ibuprofen (ADVIL;MOTRIN) 200 MG tablet Take 2 tablets by mouth every 6 hours as needed for Pain or Fever 12/30/21   Ashwini Harrington MD   potassium chloride (KLOR-CON M) 20 MEQ extended release tablet Take 1 tablet by mouth daily for 5 days 4/19/21 4/24/21  Wendy Scott DO   rivaroxaban (XARELTO) 15 MG TABS tablet Take 1 tablet by mouth 2 times daily (with meals) for 21 days 4/18/21 5/9/21  Wendy Scott DO   rivaroxaban (XARELTO) 20 MG TABS tablet Take 1 tablet by mouth daily (with breakfast) 5/10/21   Wendy Scott DO       REVIEW OF SYSTEMS    (2-9 systems for level 4, 10 or more for level 5)      Review of Systems   Constitutional: Negative for chills, diaphoresis, fatigue and fever. HENT: Positive for dental problem. Negative for congestion, drooling and sore throat. Eyes: Negative for photophobia and redness. Respiratory: Negative for cough and shortness of breath. Cardiovascular: Negative for chest pain and palpitations. Gastrointestinal: Negative for abdominal pain, nausea and vomiting. PHYSICAL EXAM   (up to 7 for level 4, 8 or more for level 5)      INITIAL VITALS:   BP (!) 169/114   Pulse 102   Temp 99 °F (37.2 °C) (Oral)   Resp 18   Ht 5' 2\" (1.575 m)   Wt 165 lb (74.8 kg)   LMP  (LMP Unknown)   SpO2 100%   BMI 30.18 kg/m²     Physical Exam  Constitutional:       General: She is not in acute distress. Appearance: Normal appearance. She is normal weight. She is not toxic-appearing or diaphoretic. HENT:      Head: Normocephalic and atraumatic. Right Ear: External ear normal.      Left Ear: External ear normal.      Nose: Nose normal.      Mouth/Throat:      Mouth: Mucous membranes are moist.      Dentition: Does not have dentures. Dental tenderness present.  No gingival swelling, dental caries, dental abscesses or gum lesions. Tongue: No lesions. Tongue does not deviate from midline. Pharynx: Oropharynx is clear. Uvula midline. No pharyngeal swelling, oropharyngeal exudate or posterior oropharyngeal erythema. Tonsils: No tonsillar exudate or tonsillar abscesses. 0 on the right. 0 on the left. Comments: Pericoronitis    Sublingual space is soft nontender with no woody induration  Eyes:      Extraocular Movements: Extraocular movements intact. Conjunctiva/sclera: Conjunctivae normal.      Pupils: Pupils are equal, round, and reactive to light. Cardiovascular:      Rate and Rhythm: Normal rate and regular rhythm. Pulses: Normal pulses. Heart sounds: Normal heart sounds. Pulmonary:      Effort: Pulmonary effort is normal.      Breath sounds: Normal breath sounds. Abdominal:      General: Abdomen is flat. There is no distension. Musculoskeletal:         General: No swelling or tenderness. Normal range of motion. Cervical back: Normal range of motion and neck supple. No rigidity. Skin:     General: Skin is warm and dry. Capillary Refill: Capillary refill takes less than 2 seconds. Neurological:      General: No focal deficit present. Mental Status: She is alert and oriented to person, place, and time. DIFFERENTIAL  DIAGNOSIS     PLAN (LABS / IMAGING / EKG):  No orders of the defined types were placed in this encounter. MEDICATIONS ORDERED:  Orders Placed This Encounter   Medications    benzocaine (ORAJEL) 10 % mucosal gel    penicillin v potassium (VEETID) tablet 500 mg     Order Specific Question:   Antimicrobial Indications     Answer:   Head and Neck Infection       DDX: Dental pain, pericoronitis    DIAGNOSTIC RESULTS / EMERGENCY DEPARTMENT COURSE / MDM   LAB RESULTS:  No results found for this visit on 04/14/22. IMPRESSION: This is a 21-year-old female presenting to the ER for dental pain for the past 3 days.   Patient appears to be in no acute distress nontoxic-appearing. Patient does have hypertension of 160/114 however slightly from patient's pain she complains of. Patient's speaking full sense without respiratory stress. Abdomen is benign. Patient does have appear to be some swelling of the gingiva at the eruption of the wisdom teeth concerning for pericarditis with no signs of Carlos's angina or gingival abscess to drain. Will give order Orajel, penicillin, and Tylenol for pain control. Plan for discharge and follow-up with dental appointment. RADIOLOGY:  None    EKG  None    All EKG's are interpreted by the Emergency Department Physician who either signs or Co-signs this chart in the absence of a cardiologist.    EMERGENCY DEPARTMENT COURSE:  Patient is both receiving the oral flushing of the gingiva along with flushing the gums. Patient is agreeable discharge plan. She was educated return precautions. PROCEDURES:  None    CONSULTS:  None    CRITICAL CARE:  Please see attending note    FINAL IMPRESSION      1. Pain, dental    2. Acute pericoronitis          DISPOSITION / PLAN     DISPOSITION   Discharge      PATIENT REFERRED TO:  No follow-up provider specified.     DISCHARGE MEDICATIONS:  New Prescriptions    No medications on file       Ketty Vega DO  Emergency Medicine Resident    (Please note that portions of thisnote were completed with a voice recognition program.  Efforts were made to edit the dictations but occasionally words are mis-transcribed.)        Ketty Vega DO  Resident  04/14/22 4627

## 2022-04-15 NOTE — ED PROVIDER NOTES
ystv       [facility]     Emergency Department     Faculty Note/ Attestation      Pt Name: Kayla Jeffers                                       MRN: [id number]  [place] 1980  Date of evaluation: 4/14/2022    Patients PCP:    No primary care provider on file. Attestation  I performed a history and physical examination of the patient and discussed management with the resident. I reviewed the residents note and agree with the documented findings and plan of care. Any areas of disagreement are noted on the chart. I was personally present for the key portions of any procedures. I have documented in the chart those procedures where I was not present during the key portions. I have reviewed the emergency nurses triage note. I agree with the chief complaint, past medical history, past surgical history, allergies, medications, social and family history as documented unless otherwise noted below. For Physician Assistant/ Nurse Practitioner cases/documentation I have personally evaluated this patient and have completed at least one if not all key elements of the E/M (history, physical exam, and MDM). Additional findings are as noted. Initial Screens:             Vitals:    Vitals:    04/14/22 2037   BP: (!) 169/114   Pulse: 102   Resp: 18   Temp: 99 °F (37.2 °C)   TempSrc: Oral   SpO2: 100%   Weight: 165 lb (74.8 kg)   Height: 5' 2\" (1.575 m)       CHIEF COMPLAINT       Chief Complaint   Patient presents with    Dental Pain     Pt c/o right lower molar pain. PT reports going to the dentist \"months ago\" and was told that her Shalom Batters is growing the worng way and I need it taken out\". Pt reports her next dental appt isn't until 05/12/2022.                DIAGNOSTIC RESULTS             RADIOLOGY:   No orders to display         LABS:  Labs Reviewed - No data to display      EMERGENCY DEPARTMENT COURSE:     -------------------------  BP: (!) 169/114, Temp: 99 °F (37.2 °C), Pulse: 102, Resp: 18      Comments    Patient has dental pain to bilateral lower wisdom teeth. Patient had been told that she needs he was in tooth out but has not had that completed yet. Nose trismus, stridor, red flags for deep space infection.   We will give her symptomatic treatment as well as antibiotics, will need to see a dentist    (Please note that portions of this note were completed with a voice recognition program.  Efforts were made to edit the dictations but occasionally words are mis-transcribed.)      Forest Barnard MD,, MD  Attending Emergency Physician         Forest Barnard MD  04/14/22 4165

## 2022-04-15 NOTE — ED NOTES
Pt asking to speak with resident, stating \"this orajel ain't doing shit, I need something more for pain\". RN notifies resident. Awaiting further orders.       Erum Hazel RN  04/14/22 1449

## 2022-06-05 ENCOUNTER — HOSPITAL ENCOUNTER (EMERGENCY)
Age: 42
Discharge: HOME | End: 2022-06-05
Attending: EMERGENCY MEDICINE
Payer: MEDICARE

## 2022-06-05 VITALS
OXYGEN SATURATION: 100 % | SYSTOLIC BLOOD PRESSURE: 154 MMHG | TEMPERATURE: 99.9 F | HEART RATE: 103 BPM | DIASTOLIC BLOOD PRESSURE: 98 MMHG | RESPIRATION RATE: 18 BRPM

## 2022-06-05 DIAGNOSIS — I82.511 CHRONIC DEEP VEIN THROMBOSIS (DVT) OF FEMORAL VEIN OF RIGHT LOWER EXTREMITY (HCC): Primary | ICD-10-CM

## 2022-06-05 DIAGNOSIS — I82.531 CHRONIC DEEP VEIN THROMBOSIS (DVT) OF POPLITEAL VEIN OF RIGHT LOWER EXTREMITY (HCC): ICD-10-CM

## 2022-06-05 PROCEDURE — 99284 EMERGENCY DEPT VISIT MOD MDM: CPT

## 2022-06-05 PROCEDURE — 93971 EXTREMITY STUDY: CPT

## 2022-06-05 RX ORDER — IBUPROFEN 800 MG/1
800 TABLET ORAL EVERY 8 HOURS PRN
Qty: 30 TABLET | Refills: 0 | OUTPATIENT
Start: 2022-06-05 | End: 2022-07-09

## 2022-06-05 RX ORDER — LOSARTAN POTASSIUM 50 MG/1
50 TABLET ORAL DAILY
Qty: 14 TABLET | Refills: 0 | Status: ON HOLD | OUTPATIENT
Start: 2022-06-05 | End: 2022-10-24 | Stop reason: HOSPADM

## 2022-06-05 ASSESSMENT — ENCOUNTER SYMPTOMS
NAUSEA: 0
ABDOMINAL PAIN: 0
CONSTIPATION: 0
VOMITING: 0
SHORTNESS OF BREATH: 0
DIARRHEA: 0
SORE THROAT: 0

## 2022-06-05 ASSESSMENT — PAIN SCALES - GENERAL: PAINLEVEL_OUTOF10: 7

## 2022-06-05 ASSESSMENT — PAIN - FUNCTIONAL ASSESSMENT: PAIN_FUNCTIONAL_ASSESSMENT: 0-10

## 2022-06-05 NOTE — Clinical Note
Fany Rodriguez was seen and treated in our emergency department on 6/5/2022. She may return to work on 06/06/2022. Please allow Fany Rodriguez to return to work on light duty for the next 7 days. If you have any questions or concerns, please don't hesitate to call.       Eliel Gonsalves, DO

## 2022-06-05 NOTE — ED TRIAGE NOTES
Patient presents to the ED with leg pain, stated she has a HX of blood clots in legs and lungs but has not had any issues in a while. Resident in to assess, vitals have been obtained by tech.

## 2022-06-05 NOTE — ED PROVIDER NOTES
[facility]     Emergency Department     Faculty Attestation    I performed a history and physical examination of the patient and discussed management with the resident. I reviewed the residents note and agree with the documented findings and plan of care. Any areas of disagreement are noted on the chart. I was personally present for the key portions of any procedures. I have documented in the chart those procedures where I was not present during the key portions. I have reviewed the emergency nurses triage note. I agree with the chief complaint, past medical history, past surgical history, allergies, medications, social and family history as documented unless otherwise noted below. For Physician Assistant/ Nurse Practitioner cases/documentation I have personally evaluated this patient and have completed at least one if not all key elements of the E/M (history, physical exam, and MDM). Additional findings are as noted. I have personally seen and evaluated the patient. I find the patient's history and physical exam are consistent with the NP/PA documentation. I agree with the care provided, treatment rendered, disposition and follow-up plan. Patient reporting leg discomfort with a history of DVTs in the past has not been on blood thinners for at least 2 years according to the patient examination reveals no significant swelling however she has a palpable cord in her posterior calf. Critical Care     Tenzin Pack M.D.   Attending Emergency  Physician             Azam Asher MD  06/05/22 0630

## 2022-06-05 NOTE — ED PROVIDER NOTES
[facility] ED  Emergency Department Encounter  Emergency Medicine Resident     Pt Name: Steffen Benjamin  MRN: [id number]  [place] 1980  Date of evaluation: 22  PCP:  No primary care provider on file. CHIEF COMPLAINT       Chief Complaint   Patient presents with    Other     concenr for blood clot in r. leg    Leg Pain       HISTORY OFPRESENT ILLNESS  (Location/Symptom, Timing/Onset, Context/Setting, Quality, Duration, Modifying Gwenith Oppenheim.)      Steffen Benjamin is a 37 y.o. female who presents with concern for DVT in her right lower extremity. Patient states she has a history of DVTs/PEs, was previously on blood thinners, states that she stopped taking them 2 to 3 years ago although was unclear for the exact time. Presents today because she said some pain/\"weird feeling \"in her right leg that feels like her similar DVTs. Patient admits to some cramping of the right leg that began about 1 day ago. No modifying or relieving factors. Pain is constant, cramping. Denies any other headaches, dizziness, chest pain, shortness of breath, abdominal pain, upper or lower extremity weakness, numbness, paresthesias. PAST MEDICAL / SURGICAL / SOCIAL / FAMILY HISTORY      has a past medical history of DVT (deep vein thrombosis) in pregnancy, H/O blood clots, and Other emphysema ([facility] [address] has a past surgical history that includes  section (, ) and Tubal ligation (). Social History     Socioeconomic History    Marital status: Single     Spouse name: Not on file    Number of children: Not on file    Years of education: Not on file    Highest education level: Not on file   Occupational History    Not on file   Tobacco Use    Smoking status: Current Every Day Smoker     Packs/day: 0.20     Types: Cigarettes    Smokeless tobacco: Never Used   Substance and Sexual Activity    Alcohol use:  Yes     Alcohol/week: 0.0 standard drinks     Comment: socially  Drug use: Yes     Types: Marijuana Tasia Arturo)     Comment: socially    Sexual activity: Yes     Partners: Male   Other Topics Concern    Not on file   Social History Narrative    Not on file     Social Determinants of Health     Financial Resource Strain:     Difficulty of Paying Living Expenses: Not on file   Food Insecurity:     Worried About Running Out of Food in the Last Year: Not on file    Steven of Food in the Last Year: Not on file   Transportation Needs:     Lack of Transportation (Medical): Not on file    Lack of Transportation (Non-Medical): Not on file   Physical Activity:     Days of Exercise per Week: Not on file    Minutes of Exercise per Session: Not on file   Stress:     Feeling of Stress : Not on file   Social Connections:     Frequency of Communication with Friends and Family: Not on file    Frequency of Social Gatherings with Friends and Family: Not on file    Attends Yazdanism Services: Not on file    Active Member of [address] [place] [address] or Organizations: Not on file    Attends Club or Organization Meetings: Not on file    Marital Status: Not on file   Intimate Partner Violence:     Fear of Current or Ex-Partner: Not on file    Emotionally Abused: Not on file    Physically Abused: Not on file    Sexually Abused: Not on file   Housing Stability:     Unable to Pay for Housing in the Last Year: Not on file    Number of [place] in the Last Year: Not on file    Unstable Housing in the Last Year: Not on file       Family History   Problem Relation Age of Onset    High Blood Pressure Mother     High Blood Pressure Maternal Grandmother        Allergies:  Adhesive tape    Home Medications:  Prior to Admission medications    Medication Sig Start Date End Date Taking?  Authorizing Provider   rivaroxaban (XARELTO) 20 MG TABS tablet Take 1 tablet by mouth daily (with breakfast) 6/5/22  Yes Jessica Joe DO   losartan (COZAAR) 50 mg tablet Take 1 tablet by mouth daily 6/5/22  Yes Susana Jorge Costa, DO   ibuprofen (IBU) 800 MG tablet Take 1 tablet by mouth every 8 hours as needed for Pain 6/5/22  Yes Gloria Arciniega, DO   acetaminophen (TYLENOL) 500 MG chewable tablet Take 2 tablets by mouth every 8 hours as needed for Pain 4/14/22 6/5/22  Rylan Bell, DO   potassium chloride (KLOR-CON M) 20 MEQ extended release tablet Take 1 tablet by mouth daily for 5 days 4/19/21 6/5/22  Zack Scott, DO       REVIEW OF SYSTEMS    (2-9 systems for level 4, 10 or more for level 5)      Review of Systems   Constitutional: Negative for chills and fever. HENT: Negative for ear pain, hearing loss and sore throat. Eyes: Negative for visual disturbance. Respiratory: Negative for shortness of breath. Cardiovascular: Negative for chest pain. Gastrointestinal: Negative for abdominal pain, constipation, diarrhea, nausea and vomiting. Genitourinary: Negative for difficulty urinating and dysuria. Musculoskeletal: Negative for arthralgias and myalgias. Pain posterior right leg   Skin: Negative for rash and wound. Neurological: Negative for numbness. Psychiatric/Behavioral: Negative for agitation and confusion. PHYSICAL EXAM   (up to 7 for level 4, 8 or more for level 5)     INITIAL VITALS:    temperature is 99.9 °F (37.7 °C). Her blood pressure is 154/98 (abnormal) and her pulse is 103 (abnormal). Her respiration is 18 and oxygen saturation is 100%. Physical Exam  Vitals and nursing note reviewed. Constitutional:       General: She is not in acute distress. Appearance: She is well-developed. She is not diaphoretic. HENT:      Head: Normocephalic and atraumatic. Right Ear: External ear normal.      Left Ear: External ear normal.      Nose: Nose normal.   Eyes:      Conjunctiva/sclera: Conjunctivae normal.   Neck:      Trachea: No tracheal deviation. Cardiovascular:      Rate and Rhythm: Normal rate and regular rhythm. Heart sounds: Normal heart sounds.  No murmur heard.  No friction rub. No gallop. Pulmonary:      Effort: Pulmonary effort is normal. No respiratory distress. Breath sounds: Normal breath sounds. Abdominal:      General: Bowel sounds are normal.      Palpations: Abdomen is soft. Tenderness: There is no abdominal tenderness. Musculoskeletal:         General: No tenderness. Normal range of motion. Cervical back: Neck supple. Comments: Palpable cord to posterior calf   Skin:     General: Skin is warm and dry. Capillary Refill: Capillary refill takes less than 2 seconds. Neurological:      Mental Status: She is alert and oriented to person, place, and time. Motor: No abnormal muscle tone. DIFFERENTIAL  DIAGNOSIS     PLAN (LABS / IMAGING / EKG):  Orders Placed This Encounter   Procedures    VL DUP LOWER EXTREMITY VENOUS RIGHT       MEDICATIONS ORDERED:  Orders Placed This Encounter   Medications    rivaroxaban (XARELTO) 20 MG TABS tablet     Sig: Take 1 tablet by mouth daily (with breakfast)     Dispense:  30 tablet     Refill:  0    losartan (COZAAR) 50 mg tablet     Sig: Take 1 tablet by mouth daily     Dispense:  14 tablet     Refill:  0    ibuprofen (IBU) 800 MG tablet     Sig: Take 1 tablet by mouth every 8 hours as needed for Pain     Dispense:  30 tablet     Refill:  0       DDX: DVT    Initial MDM/Plan: 37 y.o. female who presents with concerns for right lower extremity DVT. At time of initial examination, patient is in no acute distress, vital signs noted to be slightly hypertensive and heart rate of 103. Patient noted that she was not recently taking her blood pressure medications. On history review, patient has a history of DVTs and stating at this time she was taken off of her medications, however on chart review, it appears that patient was started on Eliquis about a year ago. Will get venous duplex of right lower extremity, anticipate restarting patient on Eliquis.     DIAGNOSTIC RESULTS / EMERGENCY DEPARTMENT COURSE / [facility]     LABS:  Labs Reviewed - No data to display      RADIOLOGY:  No results found. EMERGENCY DEPARTMENT COURSE:  ED Course as of 06/05/22 1901   Raymond Jun 05, 2022   1646 Chronic femoral and popliteal clots noted by vascular lab, no new DVTs. Discussed results with patient. Will resume patient on her previously ordered Eliquis. We will have patient follow-up with vascular within the next 1 to 2 weeks. Information for vascular follow-up provided. Patient is also requesting short-term refill of blood pressure medications until she is able to follow-up with primary care physician, will give 14-day prescription at this time. Can use Tylenol/ibuprofen as needed for pain. Work note provided. Follow up plans and ER return precautions discussed. Patient verbalized understanding and had no further questions at time of discharge. [JS]      ED Course User Index  [JS] Anastasia Dandy, DO     PROCEDURES:  None    CONSULTS:  None    CRITICAL CARE:  Please see attending note    FINAL IMPRESSION      1. Chronic deep vein thrombosis (DVT) of femoral vein of right lower extremity (HCC)    2.  Chronic deep vein thrombosis (DVT) of popliteal vein of right lower extremity [facility])        DISPOSITION / PLAN     DISPOSITION Decision To Discharge 06/05/2022 05:29:58 PM    PATIENTREFERRED TO:  [facility]  [address] [place] [address]  [phone]  Go to   As needed, If symptoms worsen    MD Mary Sommer [id number]  [facility] [id number]  [phone]    Schedule an appointment as soon as possible for a visit in 1 week      Sana Rivera [address] [facility] [phone]    Schedule an appointment as soon as possible for a visit in 1 week        DISCHARGE MEDICATIONS:  Discharge Medication List as of 6/5/2022  5:35 PM          Rosmery Dow DO  EmergencyMedicine Resident    (Please note that portions of this note were completed with a voice recognition program.  Efforts were made to edit the dictations but occasionally words are mis-transcribed.)     Candida Sanchez DO  Resident  06/05/22 [id number]

## 2022-07-09 ENCOUNTER — HOSPITAL ENCOUNTER (EMERGENCY)
Age: 42
Discharge: HOME | End: 2022-07-09
Attending: EMERGENCY MEDICINE
Payer: MEDICARE

## 2022-07-09 VITALS
DIASTOLIC BLOOD PRESSURE: 106 MMHG | OXYGEN SATURATION: 95 % | BODY MASS INDEX: 26.52 KG/M2 | HEIGHT: 66 IN | RESPIRATION RATE: 20 BRPM | HEART RATE: 88 BPM | SYSTOLIC BLOOD PRESSURE: 181 MMHG | WEIGHT: 165 LBS | TEMPERATURE: 97.5 F

## 2022-07-09 DIAGNOSIS — K08.89 PAIN, DENTAL: Primary | ICD-10-CM

## 2022-07-09 PROCEDURE — 99283 EMERGENCY DEPT VISIT LOW MDM: CPT

## 2022-07-09 PROCEDURE — 6370000000 HC RX 637 (ALT 250 FOR IP): Performed by: STUDENT IN AN ORGANIZED HEALTH CARE EDUCATION/TRAINING PROGRAM

## 2022-07-09 RX ORDER — PENICILLIN V POTASSIUM 500 MG/1
500 TABLET ORAL 4 TIMES DAILY
Qty: 28 TABLET | Refills: 0 | Status: SHIPPED | OUTPATIENT
Start: 2022-07-09 | End: 2022-07-16

## 2022-07-09 RX ORDER — PENICILLIN V POTASSIUM 250 MG/1
500 TABLET ORAL ONCE
Status: COMPLETED | OUTPATIENT
Start: 2022-07-09 | End: 2022-07-09

## 2022-07-09 RX ORDER — ACETAMINOPHEN 500 MG
1000 TABLET ORAL 3 TIMES DAILY
Qty: 20 TABLET | Refills: 0 | Status: ON HOLD | OUTPATIENT
Start: 2022-07-09 | End: 2022-10-24 | Stop reason: HOSPADM

## 2022-07-09 RX ORDER — ACETAMINOPHEN 500 MG
1000 TABLET ORAL ONCE
Status: COMPLETED | OUTPATIENT
Start: 2022-07-09 | End: 2022-07-09

## 2022-07-09 RX ADMIN — ACETAMINOPHEN 1000 MG: 500 TABLET ORAL at 22:34

## 2022-07-09 RX ADMIN — PENICILLIN V POTASIUM 500 MG: 250 TABLET ORAL at 22:34

## 2022-07-09 ASSESSMENT — PAIN SCALES - GENERAL: PAINLEVEL_OUTOF10: 10

## 2022-07-09 ASSESSMENT — ENCOUNTER SYMPTOMS
VOMITING: 0
SHORTNESS OF BREATH: 0
ABDOMINAL PAIN: 0
VOICE CHANGE: 0
FACIAL SWELLING: 0
NAUSEA: 0
TROUBLE SWALLOWING: 0

## 2022-07-09 ASSESSMENT — PAIN DESCRIPTION - ORIENTATION: ORIENTATION: RIGHT

## 2022-07-09 ASSESSMENT — PAIN - FUNCTIONAL ASSESSMENT: PAIN_FUNCTIONAL_ASSESSMENT: 0-10

## 2022-07-09 ASSESSMENT — PAIN DESCRIPTION - LOCATION: LOCATION: MOUTH

## 2022-07-10 NOTE — ED PROVIDER NOTES
[facility] [address]  ED  Emergency Department Encounter  Emergency Medicine Resident     Pt Name: Natalia Carty  NWD:[id number]  [place] 1980  Date of evaluation: 22  PCP:  No primary care provider on file. CHIEF COMPLAINT       Chief Complaint   Patient presents with    Dental Pain     HISTORY OF PRESENT ILLNESS  (Location/Symptom, Timing/Onset, Context/Setting, Quality, Duration, ModifyingFactors, Severity.)      Natalia Carty is a 37 y.o. female who presents for evaluation of right-sided dental pain x3 days. Pain initially only in the dental area but now beginning to radiate up to the ear and head. No fever, difficulty swallowing, difficulty breathing, chest pain, shortness of breath. Patient reports that her dentist is unable to get her in for the next few months. Taking ibuprofen at home without relief of symptoms. PAST MEDICAL / SURGICAL / SOCIAL /FAMILY HISTORY      has a past medical history of DVT (deep vein thrombosis) in pregnancy, H/O blood clots, and Other emphysema ([facility] [address] No other pertinent PMH on review with patient/guardian. has a past surgical history that includes  section (, ) and Tubal ligation (). No other pertinent PSH on review with patient/guardian. Social History     Socioeconomic History    Marital status: Single     Spouse name: Not on file    Number of children: Not on file    Years of education: Not on file    Highest education level: Not on file   Occupational History    Not on file   Tobacco Use    Smoking status: Current Every Day Smoker     Packs/day: 0.20     Types: Cigarettes    Smokeless tobacco: Never Used   Substance and Sexual Activity    Alcohol use:  Yes     Alcohol/week: 0.0 standard drinks     Comment: socially     Drug use: Yes     Types: Marijuana Mone Postin)     Comment: socially    Sexual activity: Yes     Partners: Male   Other Topics Concern    Not on file   Social History Narrative    Not on file Social Determinants of Health     Financial Resource Strain:     Difficulty of Paying Living Expenses: Not on file   Food Insecurity:     Worried About Running Out of Food in the Last Year: Not on file    Steven of Food in the Last Year: Not on file   Transportation Needs:     Lack of Transportation (Medical): Not on file    Lack of Transportation (Non-Medical): Not on file   Physical Activity:     Days of Exercise per Week: Not on file    Minutes of Exercise per Session: Not on file   Stress:     Feeling of Stress : Not on file   Social Connections:     Frequency of Communication with Friends and Family: Not on file    Frequency of Social Gatherings with Friends and Family: Not on file    Attends Quaker Services: Not on file    Active Member of [address] [place] [address] [employer] or Organizations: Not on file    Attends Club or Organization Meetings: Not on file    Marital Status: Not on file   Intimate Partner Violence:     Fear of Current or Ex-Partner: Not on file    Emotionally Abused: Not on file    Physically Abused: Not on file    Sexually Abused: Not on file   Housing Stability:     Unable to Pay for Housing in the Last Year: Not on file    Number of [place] in the Last Year: Not on file    Unstable Housing in the Last Year: Not on file       I counseled the patient against using tobacco products. Family History   Problem Relation Age of Onset    High Blood Pressure Mother     High Blood Pressure Maternal Grandmother      No other pertinent FamHx on review with patient/guardian. Allergies:  Adhesive tape    Home Medications:  Prior to Admission medications    Medication Sig Start Date End Date Taking? Authorizing Provider   acetaminophen (TYLENOL) 500 MG tablet Take 2 tablets by mouth 3 times daily 7/9/22  Yes Maria E Anderson DO   benzocaine (ORAJEL) 10 % mucosal gel Take by mouth as needed.  7/9/22  Yes Maria E Anderson DO   penicillin v potassium (VEETID) 500 MG tablet Take 1 tablet by mouth 4 times daily for 7 days 7/9/22 7/16/22 Yes Maria E Anderson, DO   rivaroxaban (XARELTO) 20 MG TABS tablet Take 1 tablet by mouth daily (with breakfast) 6/5/22   Dung Gomez, DO   losartan (COZAAR) 50 mg tablet Take 1 tablet by mouth daily 6/5/22   Dung Neve, DO   ibuprofen (IBU) 800 MG tablet Take 1 tablet by mouth every 8 hours as needed for Pain 6/5/22 7/9/22  Dung Gomez, DO   potassium chloride (KLOR-CON M) 20 MEQ extended release tablet Take 1 tablet by mouth daily for 5 days 4/19/21 6/5/22  Scott Scott, DO       REVIEW OF SYSTEMS    (2-9 systems for level 4, 10 ormore for level 5)      Review of Systems   Constitutional: Negative for chills and fever. HENT: Positive for dental problem. Negative for facial swelling, mouth sores, trouble swallowing and voice change. Eyes: Negative for visual disturbance. Respiratory: Negative for shortness of breath. Cardiovascular: Negative for chest pain. Gastrointestinal: Negative for abdominal pain, nausea and vomiting. Skin: Negative for rash. Allergic/Immunologic: Negative for immunocompromised state. Neurological: Negative for headaches. Hematological: Does not bruise/bleed easily. PHYSICAL EXAM   (up to 7 for level 4, 8 or more for level 5)      INITIAL VITALS:   BP (!) 181/106   Pulse 88   Temp 97.5 °F (36.4 °C) (Oral)   Resp 20   Ht 5' 6\" (1.676 m)   Wt 165 lb (74.8 kg)   SpO2 95%   BMI 26.63 kg/m²     Physical Exam  Constitutional:       General: She is not in acute distress. Appearance: Normal appearance. She is not ill-appearing, toxic-appearing or diaphoretic. HENT:      Head: Normocephalic and atraumatic. Right Ear: External ear normal.      Left Ear: External ear normal.      Mouth/Throat:      Comments: Tenderness of third molar on upper/lower right side. No periapical abscess. Floor of mouth is soft. No tongue elevation/swelling. Posterior oropharynx clear.    Eyes:      General:         Right eye: No discharge. Left eye: No discharge. Cardiovascular:      Rate and Rhythm: Normal rate. Pulmonary:      Effort: Pulmonary effort is normal. No respiratory distress. Skin:     Coloration: Skin is not jaundiced. Neurological:      General: No focal deficit present. Mental Status: She is alert. DIFFERENTIAL  DIAGNOSIS     PLAN (LABS / IMAGING / EKG):  No orders of the defined types were placed in this encounter. MEDICATIONS ORDERED:  Orders Placed This Encounter   Medications    acetaminophen (TYLENOL) 500 MG tablet     Sig: Take 2 tablets by mouth 3 times daily     Dispense:  20 tablet     Refill:  0    acetaminophen (TYLENOL) tablet 1,000 mg    benzocaine (ORAJEL) 10 % mucosal gel     Sig: Take by mouth as needed. Dispense:  1 each     Refill:  0    penicillin v potassium (VEETID) 500 MG tablet     Sig: Take 1 tablet by mouth 4 times daily for 7 days     Dispense:  28 tablet     Refill:  0    penicillin v potassium (VEETID) tablet 500 mg     Order Specific Question:   Antimicrobial Indications     Answer:   Head and Neck Infection       DIAGNOSTIC RESULTS / EMERGENCY DEPARTMENT COURSE / MDM     LABS:  No results found for this visit on 07/09/22. IMPRESSION/MDM/ED COURSE:  37 y.o. female presented with right eye dental pain x3 days. Patient hypertensive but does have history of hypertension and appears uncomfortable. No headache, changes in vision, chest pain, shortness of breath or other signs of hypertensive emergency. Patient with tenderness of gumline over right upper/lower third molar. Patient is nontoxic appearing without voice changes, drooling, or difficulty swallowing therefore low concern for deep neck space infection. No gingival erythema or warmth concerning for periapical abscess. Floor of mouth is soft without concern for Durga's. Penicillin prescribed. Orajel, Tylenol as needed for pain.   Patient was given contact information to schedule a dental appointment. Recommend follow up with a dentist as soon as possible. Discussed signs symptoms that would require reevaluation in the ED. Patient expressed understanding and agreement with plan. All questions answered. RADIOLOGY:  No orders to display       EKG  None    All EKG's are interpreted by the Emergency Department Physician who either signs or Co-signs this chart in the absence of a cardiologist.    PROCEDURES:  None    CONSULTS:  None    FINAL IMPRESSION      1. Pain, dental          DISPOSITION / PLAN     DISPOSITION Decision To Discharge 07/09/2022 10:15:04 PM      PATIENT REFERREDTO:  No follow-up provider specified. DISCHARGE MEDICATIONS:  New Prescriptions    ACETAMINOPHEN (TYLENOL) 500 MG TABLET    Take 2 tablets by mouth 3 times daily    BENZOCAINE (ORAJEL) 10 % MUCOSAL GEL    Take by mouth as needed.     PENICILLIN V POTASSIUM (VEETID) 500 MG TABLET    Take 1 tablet by mouth 4 times daily for 7 days     Torito Chávez DO  PGY 3  Resident Physician Emergency Medicine  07/09/22 10:20 PM    (Please note that portions of this note were completed with a voice recognition program.Efforts were made to edit the dictations but occasionally words are mis-transcribed.)       Samantha Barros DO  Resident  07/09/22 [id number]

## 2022-07-10 NOTE — ED NOTES
Pt presents to the ED with C/O having dental pain. Pt stated that she has been having this pain for a few weeks. Pt stated that she needs a tooth pulled. Pt stated that she has a dentist but they are fully booked for months out. Pt stated that the pain is a 10. Pt's vitals are within normal limits. Will continue to monitor and reassess.       Chandrakant Vasquez RN  07/09/22 [address], RN  07/09/22 0428

## 2022-08-19 NOTE — PROGRESS NOTES
Occupational Therapy  Visit Type: treatment  Precautions:  Medical precautions:  fall risk; standard precautions, contact precautions and special precautions.  C-diff  Lines:     Basic: capped IV      Lines in chart and on patient reviewed, precautions maintained throughout session.  Safety Measures: chair alarm, bed rails and bed alarm    SUBJECTIVE  Patient agreed to participate in therapy this date.  I need c-pap off and had BM, so need to get to commode.   Patient distraught, reports: I hope I don't get what my late spouse had,he had c-cord compression so dizzy all the time with falls.    Patient / Family Goal: return to previous functional status and return home    OBJECTIVE   Level of consciousness: alert    Oriented to person, place, time and situation     Affect/Behavior: alert, appropriate, cooperative and pleasant  Functional Communication/Cognition    Overall status:  Within functional limits    Form of communication:  Verbal    Commands: follows all commands and directions consistently.  Additional Details: Appears aware of current physical limitations.    Transfers:    Assistive devices: gait belt and 2-wheeled walker    Sit to stand: stand by assist    Stand to sit: stand by assist    Training completed:    Tasks: sit to stand, stand to sit and stand pivot    Education details: body mechanics and patient safety    Occasional verbal cue to center self prior to stand>sit.  Functional Ambulation:    Assistance:stand by assist   Assistive device:2-wheeled walker and gait belt    Distance (ft): 3;3      Education details: body mechanics and patient safety  Details/Comments: Occasional verbal cues encourage breathing method.  Activities of Daily Living (ADLs):  Grooming/Oral Hygiene:     Grooming assist: supervision and set up    Position: chair    Assist needed for: wash/dry hands  Toilet transfer:     Assist: stand by assist    Device: gait belt and 2-wheeled walker    Equipment: bedside  PULMONARY & CRITICAL CARE MEDICINE PROGRESS NOTE     Patient:  Diane Tavares  MRN: [id number]  Admit date: 4/16/2021  Primary Care Physician: No primary care provider on file. Consulting Physician: No att. providers found  CODE Status: Full Code  LOS: 2     SUBJECTIVE     CHIEF COMPLAINT/REASON FOR INITIAL CONSULT: Pulmonary embolism    BRIEF HOSPITAL COURSE:   The patient is a 45 y.o. female with known history of pulmonary embolism, initially diagnosed about 16 years ago. She has been noncompliant with anticoagulation. She presented with chest pain. CT chest showed acute pulmonary embolism involving right basal trunk, associated with right lower lobe consolidation. INTERVAL HISTORY:  04/19/21   Patient is currently on nasal cannula at 3 L/min   T-max is 98.9, white count is 16.7   Patient is on levofloxacin and therapeutic dose Lovenox   No overnight issues reported    REVIEW OF SYSTEMS:  Review of Systems   Constitutional: Positive for fatigue. Negative for appetite change, chills, fever and unexpected weight change. HENT: Negative for congestion, postnasal drip, sore throat and trouble swallowing. Eyes: Negative for visual disturbance. Respiratory: Positive for cough and shortness of breath. Negative for wheezing. Cardiovascular: Negative for chest pain, palpitations and leg swelling. Gastrointestinal: Negative for abdominal pain, constipation, diarrhea, nausea and vomiting. Genitourinary: Negative for difficulty urinating, dysuria and frequency. Musculoskeletal: Negative for arthralgias and joint swelling. Skin: Negative for rash. Allergic/Immunologic: Negative for immunocompromised state. Neurological: Positive for weakness. Negative for dizziness, speech difficulty and headaches. Hematological: Negative for adenopathy. Psychiatric/Behavioral: Negative for behavioral problems and sleep disturbance.      OBJECTIVE     VITAL SIGNS:   LAST:  BP (!) 138/92   Pulse 114   Temp 98.4 °F (36.9 °C) (Oral)   Resp 26   Ht 5' 2\" (1.575 m)   Wt 160 lb 3.2 oz (72.7 kg)   SpO2 100%   BMI 29.30 kg/m²   8-24 HR RANGE:  TEMP Temp  Av.6 °F (37 °C)  Min: 98.2 °F (36.8 °C)  Max: 98.9 °F (95.8 °C)   BP Systolic (76MPK), PL , Min:125 , ERX:007      Diastolic (98MZT), UEJ:15, Min:76, Max:98     PULSE Pulse  Av.5  Min: 114  Max: 124   RR Resp  Av  Min: 26  Max: 26   O2 SAT SpO2  Av %  Min: 100 %  Max: 100 %   OXYGEN DELIVERY No data recorded        SYSTEMIC EXAMINATION:   General appearance -mild respiratory distress  Mental status - awake & alert, follows commands  Eyes - pupils equal and reactive, sclera anicteric  Mouth - mucous membranes moist, pharynx normal without lesions  Neck - supple, no significant adenopathy, carotids upstroke normal bilaterally, no bruits  Chest -bilateral coarse breath sounds, occasional crepitations at right base  Heart - normal rate, regular rhythm, normal S1, S2, no murmurs, rubs, clicks or gallops  Abdomen - soft, nontender, nondistended, no masses or organomegaly  Neurological - non-focal  Extremities - peripheral pulses normal, no pedal edema, no clubbing or cyanosis  Skin - normal coloration and turgor, no rashes, no suspicious skin lesions noted     DATA REVIEW     Medications: Current Inpatient  Scheduled Meds:   potassium bicarb-citric acid  40 mEq Oral BID    dronabinol  2.5 mg Oral BID    potassium chloride  40 mEq Oral BID WC    losartan  50 mg Oral Daily    vitamin B-6  50 mg Oral Daily    sodium chloride flush  5-40 mL Intravenous 2 times per day    enoxaparin  1 mg/kg Subcutaneous BID    sennosides-docusate sodium  2 tablet Oral Daily    pantoprazole  40 mg Oral QAM AC    levoFLOXacin  500 mg Oral Daily     Continuous Infusions:   sodium chloride         INPUT/OUTPUT:  No intake/output data recorded. LABS:  ABGs:   No results for input(s): POCPH, POCPCO2, POCPO2, POCHCO3, PDRJ5HEQ in the last 72 hours.   CBC: commode  Toileting:     Assist: total assist - non-dependent (incontinent of BM, due to lactolose side affect -per pt.,)    Assist needed for: clothing management down, clothing management up, increased time to complete and perineal hygiene             ASSESSMENT  Impairments: activity tolerance, body habitus, balance, range of motion and strength  Functional Limitations: functional mobility, bathing, toileting, functional transfers, IADLs, dressing and showering    Patient seen 2 [facility].       Patient distraught as therapist arrived, reporting incontinent of BM due to her lactolose medication side-affect.   Patient requires SBA for sit<>stand and 2ww short distance ambulation,occasional cue for reach back with stand>sit,encourage breathing method. Total A for clean up after incontinent of BM, may benefit from toilet aide education, recommend to extend hygiene reach,and cleanup ease.       Improving activity tolerance, appears more steady and transfers with greater ease.   Anticipate able to return to home with HH therapy. Vs need for [facility]REA.             Discharge Recommendations:  Recommendation for Discharge Location: OT WI: Home with Home therapy, Post-acute facility with therapy needs (improving ability to return home with HH therapy.)  PT/OT Mobility Equipment for Discharge: has 4ww  PT/OT ADL Equipment for Discharge: pt owns raised toilet seat, reacher  OT Identified Barriers to Discharge: weakness, impaired ADL status, activity tolerance  Progress: progressing toward goals    • Skilled therapy is required to address these limitations in attempt to maximize the patient's independence.    Patient at End of Session:   Location: in chair  Safety measures: alarm system in place/re-engaged and call light within reach    PLAN  Suggestions for next session as indicated: Question if has commode for chair side at home, toilet aide info.-bring.    OT Frequency: 6 days/week  Frequency Comments: SAT,3/6 by  8/23(8-19,kw,ap)    Interventions: activity tolerance training, ADL retraining, bed mobility training, body mechanics, compensatory technique education, balance, equipment eval/education, functional transfer training, patient education, positioning, energy conservation and safety training  Agreement to plan and goals: patient agrees with goals and treatment plan      Documented in the chart in the following areas: Pain. Assessment. Plan.      Therapy procedure time and total treatment time can be found documented on the Time Entry flowsheet   Recent Labs     04/16/21  2256 04/18/21  0138 04/18/21  0700   WBC 12.3* 17.0* 16.7*   HGB 14.5 12.3 12.3   HCT 40.3 33.7* 34.7*   MCV 92.2 91.3 93.8    251 258   LYMPHOPCT 20*  --   --    RBC 4.37 3.69* 3.70*   MCH 33.2 33.3 33.2   MCHC 36.0* 36.5* 35.4*   RDW 17.0* 16.5* 17.2*     CRP:   No results for input(s): CRP in the last 72 hours. LDH:   No results for input(s): LDH in the last 72 hours. BMP:   Recent Labs     04/16/21  2256 04/18/21  0700 04/18/21  1208 04/19/21  0712 04/19/21  1213    131* 132*  --   --    K 3.4* 2.9* 2.7* 3.3* 3.9   CL 99 98 96*  --   --    CO2 20 21 21  --   --    BUN 5* 3* 4*  --   --    CREATININE 0.45* 0.38* 0.45*  --   --    GLUCOSE 106* 82 89  --   --      Liver Function Test:   No results for input(s): PROT, LABALBU, ALT, AST, GGT, ALKPHOS, BILITOT in the last 72 hours. Coagulation Profile:   Recent Labs     04/17/21  0522 04/18/21  0700 04/18/21  1208   INR 0.9 0.9 0.9   PROTIME 10.2 9.8 9.9   APTT 18.2*  --  38.5*     D-Dimer:  Recent Labs     04/16/21 2256   DDIMER 4.88     Lactic Acid:  No results for input(s): LACTA in the last 72 hours. Cardiac Enzymes:  No results for input(s): CKTOTAL, CKMB, CKMBINDEX, TROPONINI in the last 72 hours. Invalid input(s): TROPONIN, HSTROP  BNP/ProBNP:   No results for input(s): BNP, PROBNP in the last 72 hours. Triglycerides:  No results for input(s): TRIG in the last 72 hours. Microbiology:  Urine Culture:  No components found for: CURINE  Blood Culture:  No components found for: CBLOOD, CFUNGUSBL  Sputum Culture:  No components found for: CSPUTUM  Recent Labs     04/18/21  0130 04/18/21  0520   SPECDESC . BLOOD . NASOPHARYNGEAL SWAB   SPECIAL 17ML R HAND  --    CULTURE NO GROWTH 1 DAY  --      Recent Labs     04/18/21  0130 04/18/21 0520   SPECDESC . BLOOD . NASOPHARYNGEAL SWAB   SPECIAL 17ML R HAND  --    CULTURE NO GROWTH 1 DAY  --         Pathology:    Radiology Reports:  XR CHEST PORTABLE   Final Result   Right basilar effusion with adjacent atelectasis. CT CHEST PULMONARY EMBOLISM W CONTRAST   Final Result   1. Acute pulmonary embolism involving the right basal trunk and segmental   branches of the right lower lung lobe. 2. Associated multifocal ill-defined right lower lung lobe consolidation   consistent with congestion associated with pulmonary embolism. 3. Overall moderate clot burden. 4. No CT evidence of right-sided heart strain. 5. 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 6. Mild cardiomegaly. 7. Mild biapical paraseptal emphysema. 8. Note: Study limited by patient breathing motion related artifact. Critical results were called by Dr. Candice Rodríguez to [place]  on   4/17/2021 at 04:26. XR CHEST PORTABLE   Final Result   Opacification of the inferior 1/2 of the right hemithorax likely represents a   combination of middle lobe and right lower lobe consolidation and possible   right pleural fluid. Follow-up CT chest recommended. Echocardiogram:   Results for orders placed during the hospital encounter of 04/16/21   ECHO Complete 2D W Doppler W Color    Narrative   Summary  Left ventricle is normal in size, normal wall thickness, global left  ventricular systolic function is normal.  Calculated ejection fraction is 61%. Left atrium is normal in size. Inter-atrial septum is intact with no evidence for an atrial septal defect. Right atrium is normal in size. Trivial mitral regurgitation. Tricuspid valve was not well visualized. Trivial tricuspid regurgitation. Insignificant tricuspid regurgitation, unable to estimate RVSP.           ASSESSMENT AND PLAN     Assessment:    // Acute hypoxic respiratory failure  // Right-sided pulmonary embolism  // Right lower lobe consolidation/atelectasis  // History of venous thromboembolism  // Medical noncompliance  // Tobacco abuse    Plan:    I personally interviewed/examined the patient; reviewed interval history, interpreted all available radiographic and laboratory data at the time of service.  Patient remains hemodynamically stable and is currently saturating well on nasal cannula    Continue supplemental oxygen to keep oxygen saturation greater than 92%   Anticoagulation, on therapeutic dose Lovenox   Encourage incentive spirometry   Continue pulmonary toilet, aspiration precautions and bronchodilators   Continue to monitor I/O with a goal of even/negative fluid balance   Antimicrobials reviewed; continue levofloxacin   Patient was counseled about smoking cessation   Physical/occupational therapy; increase activity as tolerated   Needs home O2 evaluation at time of discharge    I would like to thank you for allowing me to participate in the care of this patient. Please feel free to call with any further questions or concerns. Cass Berger M.D. Pulmonary and Critical Care Medicine           4/19/2021, 7:24 PM    Please note that this chart was generated using voice recognition Dragon dictation software. Although every effort was made to ensure the accuracy of this automated transcription, some errors in transcription may have occurred.

## 2022-10-22 ENCOUNTER — HOSPITAL ENCOUNTER (INPATIENT)
Age: 42
LOS: 1 days | Discharge: HOME | DRG: 174 | End: 2022-10-24
Attending: EMERGENCY MEDICINE | Admitting: INTERNAL MEDICINE
Payer: MEDICARE

## 2022-10-22 ENCOUNTER — APPOINTMENT (OUTPATIENT)
Dept: GENERAL RADIOLOGY | Age: 42
DRG: 174 | End: 2022-10-22
Payer: MEDICARE

## 2022-10-22 DIAGNOSIS — I21.3 ST ELEVATION MYOCARDIAL INFARCTION (STEMI), UNSPECIFIED ARTERY (HCC): Primary | ICD-10-CM

## 2022-10-22 PROCEDURE — 6360000002 HC RX W HCPCS: Performed by: STUDENT IN AN ORGANIZED HEALTH CARE EDUCATION/TRAINING PROGRAM

## 2022-10-22 PROCEDURE — 99152 MOD SED SAME PHYS/QHP 5/>YRS: CPT

## 2022-10-22 PROCEDURE — 85730 THROMBOPLASTIN TIME PARTIAL: CPT

## 2022-10-22 PROCEDURE — 93005 ELECTROCARDIOGRAM TRACING: CPT | Performed by: STUDENT IN AN ORGANIZED HEALTH CARE EDUCATION/TRAINING PROGRAM

## 2022-10-22 PROCEDURE — 6360000002 HC RX W HCPCS

## 2022-10-22 PROCEDURE — 84484 ASSAY OF TROPONIN QUANT: CPT

## 2022-10-22 PROCEDURE — 2500000003 HC RX 250 WO HCPCS

## 2022-10-22 PROCEDURE — 85025 COMPLETE CBC W/AUTO DIFF WBC: CPT

## 2022-10-22 PROCEDURE — 96374 THER/PROPH/DIAG INJ IV PUSH: CPT

## 2022-10-22 PROCEDURE — 93458 L HRT ARTERY/VENTRICLE ANGIO: CPT

## 2022-10-22 PROCEDURE — 92941 PRQ TRLML REVSC TOT OCCL AMI: CPT

## 2022-10-22 PROCEDURE — 80048 BASIC METABOLIC PNL TOTAL CA: CPT

## 2022-10-22 PROCEDURE — 99285 EMERGENCY DEPT VISIT HI MDM: CPT

## 2022-10-22 PROCEDURE — 99153 MOD SED SAME PHYS/QHP EA: CPT

## 2022-10-22 PROCEDURE — 6360000004 HC RX CONTRAST MEDICATION

## 2022-10-22 PROCEDURE — 96375 TX/PRO/DX INJ NEW DRUG ADDON: CPT

## 2022-10-22 PROCEDURE — 71045 X-RAY EXAM CHEST 1 VIEW: CPT

## 2022-10-22 RX ORDER — HEPARIN SODIUM 1000 [USP'U]/ML
2000 INJECTION, SOLUTION INTRAVENOUS; SUBCUTANEOUS PRN
Status: DISCONTINUED | OUTPATIENT
Start: 2022-10-22 | End: 2022-10-24 | Stop reason: HOSPADM

## 2022-10-22 RX ORDER — HEPARIN SODIUM 1000 [USP'U]/ML
4000 INJECTION, SOLUTION INTRAVENOUS; SUBCUTANEOUS ONCE
Status: COMPLETED | OUTPATIENT
Start: 2022-10-22 | End: 2022-10-22

## 2022-10-22 RX ORDER — FENTANYL CITRATE 50 UG/ML
50 INJECTION, SOLUTION INTRAMUSCULAR; INTRAVENOUS ONCE
Status: COMPLETED | OUTPATIENT
Start: 2022-10-22 | End: 2022-10-22

## 2022-10-22 RX ORDER — HEPARIN SODIUM AND DEXTROSE 10000; 5 [USP'U]/100ML; G/100ML
5-30 INJECTION INTRAVENOUS CONTINUOUS
Status: DISCONTINUED | OUTPATIENT
Start: 2022-10-22 | End: 2022-10-24 | Stop reason: HOSPADM

## 2022-10-22 RX ORDER — FENTANYL CITRATE 50 UG/ML
50 INJECTION, SOLUTION INTRAMUSCULAR; INTRAVENOUS ONCE
Status: DISCONTINUED | OUTPATIENT
Start: 2022-10-22 | End: 2022-10-24 | Stop reason: HOSPADM

## 2022-10-22 RX ORDER — HEPARIN SODIUM 1000 [USP'U]/ML
4000 INJECTION, SOLUTION INTRAVENOUS; SUBCUTANEOUS PRN
Status: DISCONTINUED | OUTPATIENT
Start: 2022-10-22 | End: 2022-10-24 | Stop reason: HOSPADM

## 2022-10-22 RX ADMIN — HEPARIN SODIUM 12 UNITS/KG/HR: 10000 INJECTION, SOLUTION INTRAVENOUS at 23:36

## 2022-10-22 RX ADMIN — HEPARIN SODIUM 4000 UNITS: 1000 INJECTION INTRAVENOUS; SUBCUTANEOUS at 23:34

## 2022-10-22 RX ADMIN — FENTANYL CITRATE 50 MCG: 50 INJECTION, SOLUTION INTRAMUSCULAR; INTRAVENOUS at 23:23

## 2022-10-22 ASSESSMENT — PAIN DESCRIPTION - LOCATION: LOCATION: CHEST

## 2022-10-23 PROBLEM — I21.3 STEMI (ST ELEVATION MYOCARDIAL INFARCTION) (HCC): Status: ACTIVE | Noted: 2022-10-23

## 2022-10-23 LAB
ABSOLUTE EOS #: 0.72 K/UL (ref 0–0.4)
ABSOLUTE EOS #: <0.03 K/UL (ref 0–0.44)
ABSOLUTE IMMATURE GRANULOCYTE: 0 K/UL (ref 0–0.3)
ABSOLUTE IMMATURE GRANULOCYTE: 0.04 K/UL (ref 0–0.3)
ABSOLUTE LYMPH #: 3.65 K/UL (ref 1.1–3.7)
ABSOLUTE LYMPH #: 7.08 K/UL (ref 1–4.8)
ABSOLUTE MONO #: 0.36 K/UL (ref 0.1–1.2)
ABSOLUTE MONO #: 0.84 K/UL (ref 0.1–0.8)
ANION GAP SERPL CALCULATED.3IONS-SCNC: 14 MMOL/L (ref 9–17)
ANION GAP SERPL CALCULATED.3IONS-SCNC: 14 MMOL/L (ref 9–17)
BASOPHILS # BLD: 0 % (ref 0–2)
BASOPHILS # BLD: 0 % (ref 0–2)
BASOPHILS ABSOLUTE: 0 K/UL (ref 0–0.2)
BASOPHILS ABSOLUTE: 0.05 K/UL (ref 0–0.2)
BUN BLDV-MCNC: 11 MG/DL (ref 6–20)
BUN BLDV-MCNC: 12 MG/DL (ref 6–20)
CALCIUM SERPL-MCNC: 8.2 MG/DL (ref 8.6–10.4)
CALCIUM SERPL-MCNC: 9 MG/DL (ref 8.6–10.4)
CHLORIDE BLD-SCNC: 101 MMOL/L (ref 98–107)
CHLORIDE BLD-SCNC: 101 MMOL/L (ref 98–107)
CO2: 24 MMOL/L (ref 20–31)
CO2: 25 MMOL/L (ref 20–31)
CREAT SERPL-MCNC: 0.43 MG/DL (ref 0.5–0.9)
CREAT SERPL-MCNC: 0.55 MG/DL (ref 0.5–0.9)
EOSINOPHILS RELATIVE PERCENT: 0 % (ref 1–4)
EOSINOPHILS RELATIVE PERCENT: 6 % (ref 1–4)
GFR SERPL CREATININE-BSD FRML MDRD: >60 ML/MIN/1.73M2
GFR SERPL CREATININE-BSD FRML MDRD: >60 ML/MIN/1.73M2
GLUCOSE BLD-MCNC: 104 MG/DL (ref 70–99)
GLUCOSE BLD-MCNC: 106 MG/DL (ref 70–99)
HCT VFR BLD CALC: 37.4 % (ref 36.3–47.1)
HCT VFR BLD CALC: 37.9 % (ref 36.3–47.1)
HEMOGLOBIN: 13.4 G/DL (ref 11.9–15.1)
HEMOGLOBIN: 13.5 G/DL (ref 11.9–15.1)
IMMATURE GRANULOCYTES: 0 %
IMMATURE GRANULOCYTES: 0 %
LYMPHOCYTES # BLD: 32 % (ref 24–43)
LYMPHOCYTES # BLD: 59 % (ref 24–44)
MAGNESIUM: 1.6 MG/DL (ref 1.6–2.6)
MCH RBC QN AUTO: 31.5 PG (ref 25.2–33.5)
MCH RBC QN AUTO: 31.8 PG (ref 25.2–33.5)
MCHC RBC AUTO-ENTMCNC: 35.4 G/DL (ref 28.4–34.8)
MCHC RBC AUTO-ENTMCNC: 36.1 G/DL (ref 28.4–34.8)
MCV RBC AUTO: 87.4 FL (ref 82.6–102.9)
MCV RBC AUTO: 89.8 FL (ref 82.6–102.9)
MONOCYTES # BLD: 3 % (ref 3–12)
MONOCYTES # BLD: 7 % (ref 1–7)
MORPHOLOGY: NORMAL
NRBC AUTOMATED: 0 PER 100 WBC
NRBC AUTOMATED: 0 PER 100 WBC
PARTIAL THROMBOPLASTIN TIME: 23.3 SEC (ref 20.5–30.5)
PARTIAL THROMBOPLASTIN TIME: 30.8 SEC (ref 20.5–30.5)
PARTIAL THROMBOPLASTIN TIME: 39.6 SEC (ref 20.5–30.5)
PARTIAL THROMBOPLASTIN TIME: 41.6 SEC (ref 20.5–30.5)
PDW BLD-RTO: 17.2 % (ref 11.8–14.4)
PDW BLD-RTO: 18.2 % (ref 11.8–14.4)
PLATELET # BLD: 203 K/UL (ref 138–453)
PLATELET # BLD: 219 K/UL (ref 138–453)
PMV BLD AUTO: 11.8 FL (ref 8.1–13.5)
PMV BLD AUTO: 11.8 FL (ref 8.1–13.5)
POTASSIUM SERPL-SCNC: 3.1 MMOL/L (ref 3.7–5.3)
POTASSIUM SERPL-SCNC: 3.4 MMOL/L (ref 3.7–5.3)
POTASSIUM SERPL-SCNC: 3.5 MMOL/L (ref 3.7–5.3)
RBC # BLD: 4.22 M/UL (ref 3.95–5.11)
RBC # BLD: 4.28 M/UL (ref 3.95–5.11)
RBC # BLD: ABNORMAL 10*6/UL
REASON FOR REJECTION: NORMAL
SEG NEUTROPHILS: 28 % (ref 36–66)
SEG NEUTROPHILS: 64 % (ref 36–65)
SEGMENTED NEUTROPHILS ABSOLUTE COUNT: 3.36 K/UL (ref 1.8–7.7)
SEGMENTED NEUTROPHILS ABSOLUTE COUNT: 7.2 K/UL (ref 1.5–8.1)
SODIUM BLD-SCNC: 139 MMOL/L (ref 135–144)
SODIUM BLD-SCNC: 140 MMOL/L (ref 135–144)
TROPONIN, HIGH SENSITIVITY: 10 NG/L (ref 0–14)
TROPONIN, HIGH SENSITIVITY: 602 NG/L (ref 0–14)
TROPONIN, HIGH SENSITIVITY: 926 NG/L (ref 0–14)
WBC # BLD: 11.3 K/UL (ref 3.5–11.3)
WBC # BLD: 12 K/UL (ref 3.5–11.3)
ZZ NTE CLEAN UP: ORDERED TEST: NORMAL
ZZ NTE WITH NAME CLEAN UP: SPECIMEN SOURCE: NORMAL

## 2022-10-23 PROCEDURE — 99153 MOD SED SAME PHYS/QHP EA: CPT

## 2022-10-23 PROCEDURE — 97530 THERAPEUTIC ACTIVITIES: CPT

## 2022-10-23 PROCEDURE — C1769 GUIDE WIRE: HCPCS

## 2022-10-23 PROCEDURE — 97535 SELF CARE MNGMENT TRAINING: CPT

## 2022-10-23 PROCEDURE — 80048 BASIC METABOLIC PNL TOTAL CA: CPT

## 2022-10-23 PROCEDURE — 2709999900 HC NON-CHARGEABLE SUPPLY

## 2022-10-23 PROCEDURE — 6370000000 HC RX 637 (ALT 250 FOR IP)

## 2022-10-23 PROCEDURE — 6360000002 HC RX W HCPCS: Performed by: STUDENT IN AN ORGANIZED HEALTH CARE EDUCATION/TRAINING PROGRAM

## 2022-10-23 PROCEDURE — 83735 ASSAY OF MAGNESIUM: CPT

## 2022-10-23 PROCEDURE — 2580000003 HC RX 258: Performed by: STUDENT IN AN ORGANIZED HEALTH CARE EDUCATION/TRAINING PROGRAM

## 2022-10-23 PROCEDURE — C1894 INTRO/SHEATH, NON-LASER: HCPCS

## 2022-10-23 PROCEDURE — 027035Z DILATION OF CORONARY ARTERY, ONE ARTERY WITH TWO DRUG-ELUTING INTRALUMINAL DEVICES, PERCUTANEOUS APPROACH: ICD-10-PCS | Performed by: INTERNAL MEDICINE

## 2022-10-23 PROCEDURE — C1725 CATH, TRANSLUMIN NON-LASER: HCPCS

## 2022-10-23 PROCEDURE — 85730 THROMBOPLASTIN TIME PARTIAL: CPT

## 2022-10-23 PROCEDURE — 84132 ASSAY OF SERUM POTASSIUM: CPT

## 2022-10-23 PROCEDURE — 97166 OT EVAL MOD COMPLEX 45 MIN: CPT

## 2022-10-23 PROCEDURE — B2151ZZ FLUOROSCOPY OF LEFT HEART USING LOW OSMOLAR CONTRAST: ICD-10-PCS | Performed by: INTERNAL MEDICINE

## 2022-10-23 PROCEDURE — 97162 PT EVAL MOD COMPLEX 30 MIN: CPT

## 2022-10-23 PROCEDURE — 2700000000 HC OXYGEN THERAPY PER DAY

## 2022-10-23 PROCEDURE — 4A023N7 MEASUREMENT OF CARDIAC SAMPLING AND PRESSURE, LEFT HEART, PERCUTANEOUS APPROACH: ICD-10-PCS | Performed by: INTERNAL MEDICINE

## 2022-10-23 PROCEDURE — B2131ZZ FLUOROSCOPY OF MULTIPLE CORONARY ARTERY BYPASS GRAFTS USING LOW OSMOLAR CONTRAST: ICD-10-PCS | Performed by: INTERNAL MEDICINE

## 2022-10-23 PROCEDURE — 36415 COLL VENOUS BLD VENIPUNCTURE: CPT

## 2022-10-23 PROCEDURE — B2111ZZ FLUOROSCOPY OF MULTIPLE CORONARY ARTERIES USING LOW OSMOLAR CONTRAST: ICD-10-PCS | Performed by: INTERNAL MEDICINE

## 2022-10-23 PROCEDURE — 2500000003 HC RX 250 WO HCPCS

## 2022-10-23 PROCEDURE — 84484 ASSAY OF TROPONIN QUANT: CPT

## 2022-10-23 PROCEDURE — 99152 MOD SED SAME PHYS/QHP 5/>YRS: CPT

## 2022-10-23 PROCEDURE — C1874 STENT, COATED/COV W/DEL SYS: HCPCS

## 2022-10-23 PROCEDURE — 94761 N-INVAS EAR/PLS OXIMETRY MLT: CPT

## 2022-10-23 PROCEDURE — 85025 COMPLETE CBC W/AUTO DIFF WBC: CPT

## 2022-10-23 PROCEDURE — 93005 ELECTROCARDIOGRAM TRACING: CPT | Performed by: STUDENT IN AN ORGANIZED HEALTH CARE EDUCATION/TRAINING PROGRAM

## 2022-10-23 PROCEDURE — 92941 PRQ TRLML REVSC TOT OCCL AMI: CPT

## 2022-10-23 PROCEDURE — 2060000000 HC ICU INTERMEDIATE R&B

## 2022-10-23 PROCEDURE — 93458 L HRT ARTERY/VENTRICLE ANGIO: CPT

## 2022-10-23 PROCEDURE — 6370000000 HC RX 637 (ALT 250 FOR IP): Performed by: STUDENT IN AN ORGANIZED HEALTH CARE EDUCATION/TRAINING PROGRAM

## 2022-10-23 PROCEDURE — 97116 GAIT TRAINING THERAPY: CPT

## 2022-10-23 PROCEDURE — C1887 CATHETER, GUIDING: HCPCS

## 2022-10-23 RX ORDER — MORPHINE SULFATE 4 MG/ML
2 INJECTION, SOLUTION INTRAMUSCULAR; INTRAVENOUS EVERY 4 HOURS PRN
Status: COMPLETED | OUTPATIENT
Start: 2022-10-23 | End: 2022-10-23

## 2022-10-23 RX ORDER — LOSARTAN POTASSIUM 25 MG/1
25 TABLET ORAL DAILY
Status: DISCONTINUED | OUTPATIENT
Start: 2022-10-23 | End: 2022-10-24 | Stop reason: HOSPADM

## 2022-10-23 RX ORDER — POTASSIUM CHLORIDE 20 MEQ/1
40 TABLET, EXTENDED RELEASE ORAL ONCE
Status: COMPLETED | OUTPATIENT
Start: 2022-10-23 | End: 2022-10-23

## 2022-10-23 RX ORDER — ASPIRIN 81 MG/1
81 TABLET, CHEWABLE ORAL DAILY
Status: DISCONTINUED | OUTPATIENT
Start: 2022-10-24 | End: 2022-10-24 | Stop reason: HOSPADM

## 2022-10-23 RX ORDER — HYDRALAZINE HYDROCHLORIDE 20 MG/ML
10 INJECTION INTRAMUSCULAR; INTRAVENOUS EVERY 6 HOURS PRN
Status: DISCONTINUED | OUTPATIENT
Start: 2022-10-23 | End: 2022-10-24 | Stop reason: HOSPADM

## 2022-10-23 RX ORDER — SODIUM CHLORIDE 0.9 % (FLUSH) 0.9 %
5-40 SYRINGE (ML) INJECTION EVERY 12 HOURS SCHEDULED
Status: DISCONTINUED | OUTPATIENT
Start: 2022-10-23 | End: 2022-10-24 | Stop reason: HOSPADM

## 2022-10-23 RX ORDER — ONDANSETRON 4 MG/1
4 TABLET, ORALLY DISINTEGRATING ORAL EVERY 8 HOURS PRN
Status: DISCONTINUED | OUTPATIENT
Start: 2022-10-22 | End: 2022-10-24 | Stop reason: HOSPADM

## 2022-10-23 RX ORDER — ONDANSETRON 2 MG/ML
4 INJECTION INTRAMUSCULAR; INTRAVENOUS EVERY 6 HOURS PRN
Status: DISCONTINUED | OUTPATIENT
Start: 2022-10-22 | End: 2022-10-24 | Stop reason: HOSPADM

## 2022-10-23 RX ORDER — POLYETHYLENE GLYCOL 3350 17 G/17G
17 POWDER, FOR SOLUTION ORAL DAILY PRN
Status: DISCONTINUED | OUTPATIENT
Start: 2022-10-22 | End: 2022-10-24 | Stop reason: HOSPADM

## 2022-10-23 RX ORDER — SODIUM CHLORIDE 0.9 % (FLUSH) 0.9 %
10 SYRINGE (ML) INJECTION PRN
Status: DISCONTINUED | OUTPATIENT
Start: 2022-10-22 | End: 2022-10-24 | Stop reason: HOSPADM

## 2022-10-23 RX ORDER — SODIUM CHLORIDE 9 MG/ML
INJECTION, SOLUTION INTRAVENOUS PRN
Status: DISCONTINUED | OUTPATIENT
Start: 2022-10-22 | End: 2022-10-24 | Stop reason: HOSPADM

## 2022-10-23 RX ORDER — SODIUM CHLORIDE 0.9 % (FLUSH) 0.9 %
5-40 SYRINGE (ML) INJECTION PRN
Status: DISCONTINUED | OUTPATIENT
Start: 2022-10-23 | End: 2022-10-24 | Stop reason: HOSPADM

## 2022-10-23 RX ORDER — CARVEDILOL 6.25 MG/1
6.25 TABLET ORAL 2 TIMES DAILY WITH MEALS
Status: DISCONTINUED | OUTPATIENT
Start: 2022-10-23 | End: 2022-10-23

## 2022-10-23 RX ORDER — POTASSIUM CHLORIDE 7.45 MG/ML
10 INJECTION INTRAVENOUS
Status: DISCONTINUED | OUTPATIENT
Start: 2022-10-23 | End: 2022-10-23

## 2022-10-23 RX ORDER — CARVEDILOL 12.5 MG/1
12.5 TABLET ORAL 2 TIMES DAILY WITH MEALS
Status: DISCONTINUED | OUTPATIENT
Start: 2022-10-23 | End: 2022-10-24 | Stop reason: HOSPADM

## 2022-10-23 RX ORDER — ACETAMINOPHEN 650 MG/1
650 SUPPOSITORY RECTAL EVERY 6 HOURS PRN
Status: DISCONTINUED | OUTPATIENT
Start: 2022-10-22 | End: 2022-10-24 | Stop reason: HOSPADM

## 2022-10-23 RX ORDER — ACETAMINOPHEN 325 MG/1
650 TABLET ORAL EVERY 6 HOURS PRN
Status: DISCONTINUED | OUTPATIENT
Start: 2022-10-22 | End: 2022-10-24 | Stop reason: HOSPADM

## 2022-10-23 RX ORDER — MAGNESIUM SULFATE 1 G/100ML
1000 INJECTION INTRAVENOUS ONCE
Status: COMPLETED | OUTPATIENT
Start: 2022-10-23 | End: 2022-10-23

## 2022-10-23 RX ORDER — ACETAMINOPHEN 325 MG/1
650 TABLET ORAL EVERY 4 HOURS PRN
Status: DISCONTINUED | OUTPATIENT
Start: 2022-10-23 | End: 2022-10-24 | Stop reason: HOSPADM

## 2022-10-23 RX ORDER — LORAZEPAM 0.5 MG/1
0.5 TABLET ORAL EVERY 6 HOURS PRN
Status: DISCONTINUED | OUTPATIENT
Start: 2022-10-23 | End: 2022-10-24 | Stop reason: HOSPADM

## 2022-10-23 RX ORDER — SODIUM CHLORIDE 9 MG/ML
INJECTION, SOLUTION INTRAVENOUS PRN
Status: DISCONTINUED | OUTPATIENT
Start: 2022-10-23 | End: 2022-10-24 | Stop reason: HOSPADM

## 2022-10-23 RX ORDER — ATORVASTATIN CALCIUM 80 MG/1
80 TABLET, FILM COATED ORAL NIGHTLY
Status: DISCONTINUED | OUTPATIENT
Start: 2022-10-23 | End: 2022-10-24 | Stop reason: HOSPADM

## 2022-10-23 RX ORDER — SODIUM CHLORIDE 0.9 % (FLUSH) 0.9 %
10 SYRINGE (ML) INJECTION EVERY 12 HOURS SCHEDULED
Status: DISCONTINUED | OUTPATIENT
Start: 2022-10-23 | End: 2022-10-24 | Stop reason: HOSPADM

## 2022-10-23 RX ADMIN — HYDRALAZINE HYDROCHLORIDE 10 MG: 20 INJECTION INTRAMUSCULAR; INTRAVENOUS at 10:09

## 2022-10-23 RX ADMIN — ATORVASTATIN CALCIUM 80 MG: 80 TABLET, FILM COATED ORAL at 20:26

## 2022-10-23 RX ADMIN — MORPHINE SULFATE 2 MG: 4 INJECTION INTRAVENOUS at 02:41

## 2022-10-23 RX ADMIN — HEPARIN SODIUM 16 UNITS/KG/HR: 10000 INJECTION, SOLUTION INTRAVENOUS at 21:51

## 2022-10-23 RX ADMIN — HEPARIN SODIUM 2000 UNITS: 1000 INJECTION INTRAVENOUS; SUBCUTANEOUS at 21:49

## 2022-10-23 RX ADMIN — POTASSIUM CHLORIDE 40 MEQ: 1500 TABLET, EXTENDED RELEASE ORAL at 09:11

## 2022-10-23 RX ADMIN — ATORVASTATIN CALCIUM 80 MG: 80 TABLET, FILM COATED ORAL at 01:29

## 2022-10-23 RX ADMIN — MORPHINE SULFATE 2 MG: 4 INJECTION INTRAVENOUS at 06:39

## 2022-10-23 RX ADMIN — LOSARTAN POTASSIUM 25 MG: 25 TABLET, FILM COATED ORAL at 13:27

## 2022-10-23 RX ADMIN — HEPARIN SODIUM 2000 UNITS: 1000 INJECTION INTRAVENOUS; SUBCUTANEOUS at 15:37

## 2022-10-23 RX ADMIN — LORAZEPAM 0.5 MG: 0.5 TABLET ORAL at 13:27

## 2022-10-23 RX ADMIN — MORPHINE SULFATE 2 MG: 4 INJECTION INTRAVENOUS at 11:04

## 2022-10-23 RX ADMIN — SODIUM CHLORIDE, PRESERVATIVE FREE 10 ML: 5 INJECTION INTRAVENOUS at 20:26

## 2022-10-23 RX ADMIN — HEPARIN SODIUM 12 UNITS/KG/HR: 10000 INJECTION, SOLUTION INTRAVENOUS at 01:15

## 2022-10-23 RX ADMIN — CARVEDILOL 12.5 MG: 12.5 TABLET, FILM COATED ORAL at 16:18

## 2022-10-23 RX ADMIN — SODIUM CHLORIDE, PRESERVATIVE FREE 10 ML: 5 INJECTION INTRAVENOUS at 09:13

## 2022-10-23 RX ADMIN — CARVEDILOL 12.5 MG: 12.5 TABLET, FILM COATED ORAL at 09:11

## 2022-10-23 RX ADMIN — MAGNESIUM SULFATE HEPTAHYDRATE 1000 MG: 1 INJECTION, SOLUTION INTRAVENOUS at 06:40

## 2022-10-23 RX ADMIN — HYDRALAZINE HYDROCHLORIDE 10 MG: 20 INJECTION INTRAMUSCULAR; INTRAVENOUS at 01:17

## 2022-10-23 RX ADMIN — POTASSIUM CHLORIDE 40 MEQ: 1500 TABLET, EXTENDED RELEASE ORAL at 03:52

## 2022-10-23 RX ADMIN — LORAZEPAM 0.5 MG: 0.5 TABLET ORAL at 21:54

## 2022-10-23 ASSESSMENT — PAIN DESCRIPTION - LOCATION
LOCATION: CHEST;BACK
LOCATION: CHEST
LOCATION: CHEST

## 2022-10-23 ASSESSMENT — PAIN SCALES - GENERAL
PAINLEVEL_OUTOF10: 4
PAINLEVEL_OUTOF10: 8
PAINLEVEL_OUTOF10: 6
PAINLEVEL_OUTOF10: 5
PAINLEVEL_OUTOF10: 8
PAINLEVEL_OUTOF10: 7
PAINLEVEL_OUTOF10: 7
PAINLEVEL_OUTOF10: 4
PAINLEVEL_OUTOF10: 6

## 2022-10-23 ASSESSMENT — ENCOUNTER SYMPTOMS
CONSTIPATION: 0
NAUSEA: 1
BACK PAIN: 0
VOMITING: 0
SHORTNESS OF BREATH: 1
ABDOMINAL PAIN: 0
DIARRHEA: 0
COUGH: 0

## 2022-10-23 ASSESSMENT — PAIN DESCRIPTION - DESCRIPTORS
DESCRIPTORS: ACHING
DESCRIPTORS: ACHING

## 2022-10-23 NOTE — CARE COORDINATION
Attempted initial assessment x2 this morning, pt working with PT/OT, then talking on the telephone, will try back as time permits.

## 2022-10-23 NOTE — PROGRESS NOTES
[facility]   Progress Note                   Date:   10/23/2022  Patient name: Rodriguez Kuhn  Date of admission:  10/22/2022 11:18 PM  MRN:   [id number]  YOB: 1980  PCP: No primary care provider on file. Reason for Admission:     Subjective:       Postop day 1 of cardiac cath for STEMI of the RCA. No acute issues overnight. No chest pain or shortness of breath. Site of access soft and nontender. Still very anxious    Medications:   Scheduled Meds:   sodium chloride flush  10 mL IntraVENous 2 times per day    [START ON 10/24/2022] aspirin  81 mg Oral Daily    atorvastatin  80 mg Oral Nightly    sodium chloride flush  5-40 mL IntraVENous 2 times per day    [START ON 10/24/2022] ticagrelor  90 mg Oral BID    carvedilol  6.25 mg Oral BID WC    magnesium sulfate  1,000 mg IntraVENous Once    fentanNYL  50 mcg IntraVENous Once     Continuous Infusions:   sodium chloride      sodium chloride      heparin (PORCINE) Infusion 12 Units/kg/hr (10/23/22 0543)     CBC:   Recent Labs     10/22/22  2335 10/23/22  0350   WBC 12.0* 11.3   HGB 13.4 13.5    203     BMP:    Recent Labs     10/23/22  0125 10/23/22  0350    139   K 3.1* 3.4*    101   CO2 25 24   BUN 12 11   CREATININE 0.55 0.43*   GLUCOSE 106* 104*         Objective:   Vitals: BP (!) 135/94   Pulse 100   Temp 98.1 °F (36.7 °C) (Oral)   Resp 14   Ht 5' 2\" (1.575 m)   Wt 151 lb 0.2 oz (68.5 kg)   SpO2 100%   BMI 27.62 kg/m²   General appearance: alert and cooperative with exam  HEENT: Head: Normocephalic, no lesions, without obvious abnormality.   Neck: no adenopathy, no carotid bruit, no JVD, supple, symmetrical, trachea midline and thyroid not enlarged, symmetric, no tenderness/mass/nodules  Lungs: clear to auscultation bilaterally  Heart: regular rate and rhythm, S1, S2 normal, no murmur, click, rub or gallop  Abdomen: soft, non-tender; bowel sounds normal; no masses,  no organomegaly  Extremities: extremities normal, atraumatic, no cyanosis or edema  Neurologic: Mental status: Alert, oriented, thought content appropriate    DATA:    Diagnostics:    EKG: STEMi. ECHO: 10/22/2022  Left ventricle is normal in size, normal wall thickness, global left  ventricular systolic function is normal.  Calculated ejection fraction is 61%. Left atrium is normal in size. Inter-atrial septum is intact with no evidence for an atrial septal defect. Right atrium is normal in size. Trivial mitral regurgitation. Tricuspid valve was not well visualized. Trivial tricuspid regurgitation. Insignificant tricuspid regurgitation, unable to estimate RVSP    Cardiac angiography 10/22/2022: LMCA: Normal 0% stenosis. LAD: Normal 0% stenosis. LCx: Normal 0% stenosis. RCA: Multiple stenosis. Lesion on Mid RCA: 99% stenosis 34 mm length reduced to 0%. Pre procedure    LEELEE II flow was noted. Post Procedure LEELEE III flow was present. Good    runoff was present. The lesion was diagnosed as High Risk (C)       Lesion on Dist RCA: 80% stenosis 34 mm length reduced to 0%. Pre    procedure LEELEE III flow was noted. Post Procedure LEELEE III flow was    present. Good runoff was present. The lesion was diagnosed as Moderate    Risk (B). Coronary Tree      Dominance: Right     LV Analysis  LV function assessed as:Normal.  Ejection Fraction  +----------------------------------------------------------------------+---+  ! Method                                                                ! EF%! +----------------------------------------------------------------------+---+  ! LV gram                                                               !50 !  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 Successful PCI / Drug Eluting Stent of the mid and distal RCA. Normal LAD and LCX.    Borderline normal LV systolic function with inferior basal akinesis. Recommendations:      Routine Post MI/Stent Orders. Medical therapy as needed. Risk factor modification. IMPRESSION:    Inferior STEMI status post mid RCA occlusion with thrombus with PCI/SERENA of mid and distal RCA 10/22/2022. Initial presentation with chest pain. History of PE on Xarelto. Hypertension. RECOMMENDATIONS:  Continue aspirin, statin, Coreg, Brilinta. Change heparin to Xarelto for history of DVT and PE  Trend troponin to peak. Increased Coreg and added losartan. Follow echocardiogram.  PT OT, incentive spirometry. Add Ativan for anxiety as needed  Will continue to follow. Silvia Smith MD       Cardiovascular Fellow  10/23/2022, 6:08 AM      Attending Physician Statement  I have discussed the case of Alexandra Wilkins including pertinent history and exam findings with the resident. I have seen and examined the patient and the key elements of the encounter have been performed by me. I agree with the assessment, plan and orders as documented by the resident With changes made to the note.      Electronically signed by Lebron Alvarez MD on 10/23/2022 at 2:57 PM.    [facility]      [phone]

## 2022-10-23 NOTE — PROGRESS NOTES
Occupational Therapy  Facility/Department: [facility] CAR 1- SICU  Occupational Therapy Initial Assessment    Name: Roberto Carlos Hahn  : 1980  MRN: [id number]  Date of Service: 10/23/2022    Discharge Recommendations:  Patient would benefit from continued therapy after discharge  OT Equipment Recommendations  Equipment Needed: Yes  Mobility Devices: ADL Assistive Devices  ADL Assistive Devices: Shower Chair with back       Patient Diagnosis(es): The encounter diagnosis was ST elevation myocardial infarction (STEMI), unspecified artery ([facility] [address] Past Medical History:  has a past medical history of DVT (deep vein thrombosis) in pregnancy, H/O blood clots, and Other emphysema ([facility] [address] Past Surgical History:  has a past surgical history that includes  section (, ) and Tubal ligation (). Chief Complaint   Patient presents with    Chest Pain         Assessment   Performance deficits / Impairments: Decreased functional mobility ; Decreased ADL status; Decreased endurance;Decreased high-level IADLs;Decreased balance;Decreased strength  Assessment: Pt demonstrated supine to sit transfer EOB with SBA, functional sit<>stand transfers with Min A-CGA and functional mobility with CGA. Slightly unsteady; however, biggest limitation this visit is weakness, decreased endurance and tachycardia. Pt HR ranging from 115-133 with increased activity. RN notified of HR. OT facilitated toileting with CGA and LB dressing EOB with SBA. Pt is expected to require skilled OT services during their acute hospitalization stay to address the above noted deficits through skilled occupational therapy intervention for promotion of increased independence throughout ADLs, IADLs and functional mobility tasks.    Prognosis: Good  Decision Making: Medium Complexity  REQUIRES OT FOLLOW-UP: Yes  Activity Tolerance  Activity Tolerance: Patient Tolerated treatment well;Patient limited by fatigue  Activity Tolerance Comments: Limited by tachycardia        Plan   Occupational Therapy Plan  Times Per Week: 2-4x/wk  Current Treatment Recommendations: Balance training, Functional mobility training, Endurance training, Safety education & training, Patient/Caregiver education & training, Self-Care / ADL, Strengthening, Equipment evaluation, education, & procurement     Restrictions  Restrictions/Precautions  Restrictions/Precautions: Up as Tolerated  Required Braces or Orthoses?: No  Position Activity Restriction  Other position/activity restrictions: Up with assistance; s/p cath    Subjective   General  Patient assessed for rehabilitation services?: Yes  Family / Caregiver Present: Yes  General Comment  Comments: RN ok'd for OT/PT eval this AM. Pt agreeable to session, pleasent/cooperative throughout. Pt reports 6/20 pain in back and chest. Pt intermittently tearful throughout session, having difficulty with current situation. Provided emotional support throughout. On 2L of NC oxygen throughout session. Social/Functional History  Social/Functional History  Lives With: Spouse, Other (comment) (Children)  Type of Home: House  Home Layout: One level  Home Access: Stairs to enter without rails  Entrance Stairs - Number of Steps: 6  Bathroom Shower/Tub: Tub/Shower unit  Bathroom Toilet: Standard  Bathroom Equipment:  (reports none)  Home Equipment:  (None)  ADL Assistance: Independent  Homemaking Assistance: Independent  Homemaking Responsibilities: Yes  Ambulation Assistance: Independent  Transfer Assistance: Independent  Active : No (Uses cab)  Mode of Transportation: [facility]  Occupation: Full time employment  Type of Occupation:   Leisure & Hobbies: spending time with grandkids, cooking  Additional Comments: Pt reports having friends/ family that would be able to assist as needed. Objective        Safety Devices  Type of Devices: Call light within reach; Chair alarm in place; Left in chair;Gait belt;Nurse notified  Restraints  Restraints Initially in Place: No    Balance  Sitting: Intact (Pt sat EOB unsupported EOB and on BSC with SBA statically/dynamically for ~15 minutes)  Standing: With support (Pt stood statically and dynamically for toileting and EOB with CGA and use of RW. No LOB, but pt significantly limited by fatigue/decreased endurance.)    Gait  Overall Level of Assistance: Contact-guard assistance; Additional time; Adaptive equipment (Pt completed functional mobility EOB->BCS->bedside chair. Pt limited to further mobility d/t significant fatigue and HR reaching 133.)  Assistive Device: Walker, rolling    Toilet Transfers  Toilet - Technique: Ambulating  Equipment Used: Standard bedside commode  Toilet Transfer: Contact guard assistance  Toilet Transfers Comments: Use of B rails; increased time/effort  AROM: Within functional limits  Strength: Generally decreased, functional (Grossly 4-/5 to BUEs)  Coordination: Within functional limits  Tone: Normal  Sensation: Intact (Denies any numbness/tingling)    ADL  Feeding: Independent;Setup  Grooming: Modified independent ;Setup; Increased time to complete  UE Bathing: Stand by assistance; Increased time to complete;Setup  UE Dressing: Stand by assistance; Increased time to complete  UE Dressing Skilled Clinical Factors: Pt donned gown around back with assist for lines only. LE Dressing: Contact guard assistance; Increased time to complete  LE Dressing Skilled Clinical Factors: Pt donned B socks EOB with SBA for balance. Utilized figure four tech and required increased effort d/t chest discomfort and fatigue. Expected to requrie CGA for standing LB dressing activities. Toileting: Contact guard assistance  Toileting Skilled Clinical Factors: CGA for functional toilet transfer on/off BSC. Completed pericare and clothing management standing with 0-1 UE support on RW throughout activity.         Bed mobility  Supine to Sit: Stand by assistance  Sit to Supine:  (Pt retired to bedside recliner upon exit)  Scooting: Stand by assistance  Bed Mobility Comments: HOB ~30 degrees; significant time/effort required d/t pain and fatigue    Transfers  Sit to stand: Minimal assistance  Stand to sit: Contact guard assistance  Transfer Comments: Required Min A for sit to stand off bed. Completed all remaining transfers with CGA. Use of RW. Increased time/effort.     Vision  Vision: Impaired  Vision Exceptions: Wears glasses for distance;Wears glasses for reading  Hearing  Hearing: Within functional limits    Cognition  Overall Cognitive Status: WFL  Orientation  Overall Orientation Status: Within Functional Limits  Orientation Level: Oriented X4           Education Provided Comments: Pt educated on OT role, OT POC, activity promotion, EC/WS strategies, transfer training-good return  LUE AROM (degrees)  LUE AROM : WFL  Left Hand AROM (degrees)  Left Hand AROM: WFL  RUE AROM (degrees)  RUE AROM : WFL  Right Hand AROM (degrees)  Right Hand AROM: WFL          AM-PAC Score        AM-PAC Inpatient Daily Activity Raw Score: 20 (10/23/22 1319)  AM-PAC Inpatient ADL T-Scale Score : 42.03 (10/23/22 1319)  ADL Inpatient CMS 0-100% Score: 38.32 (10/23/22 1319)  ADL Inpatient CMS G-Code Modifier : CJ (10/23/22 1319)    Goals  Short Term Goals  Time Frame for Short Term Goals: By discharge, pt will:  Short Term Goal 1: Demo functional sit<>stand transfers and functional mobility with Mod IND and LRD PRN  Short Term Goal 2: Demo 8 minutes of dynamic standing tolerance with SBA to promote increased engagement in ADLs  Short Term Goal 3: Demo ADLs with Mod IND and use of DME PRN  Short Term Goal 4: Demo implementation of EC/WS strategies PRN throughout all functional activities each visit with 0 VCs  Short Term Goal 5: Demo 30 minutes of activity tolerance to promote increased endurance throughout ADLs/IADLs  Short Term Goal 6: Particpiate in 10 minutes of BUE strengthening each visit to promote functional use throughout ADLs/IADLs Therapy Time   Individual Concurrent Group Co-treatment   Time In 0818         Time Out 0347         Minutes 36         Timed Code Treatment Minutes: [address], OTR/L

## 2022-10-23 NOTE — H&P
[facility] Cardiology    Consult / H&P               Today's Date: 10/22/2022  Patient Name: Carito Arias  Date of admission: 10/22/2022 11:18 PM  Patient's age: 40 y.o., 1980  Admission Dx: No admission diagnoses are documented for this encounter. Reason for Consult:  Cardiac evaluation    Requesting Physician: No admitting provider for patient encounter. CHIEF COMPLAINT:  Chest pain    History Obtained From:  patient, electronic medical record    HISTORY OF PRESENT ILLNESS:      The patient is a 40y.o. year old female who presented to the ER with chest pain and was found to have inferior STEMi on EKG, interventional cardiology was paged and Cath Lab was activated on emergent basis for left heart catheterization. Past Medical History:   has a past medical history of DVT (deep vein thrombosis) in pregnancy, H/O blood clots, and Other emphysema ([facility] [address] Past Surgical History:   has a past surgical history that includes  section (, ) and Tubal ligation (). Home Medications:    Prior to Admission medications    Medication Sig Start Date End Date Taking? Authorizing Provider   acetaminophen (TYLENOL) 500 MG tablet Take 2 tablets by mouth 3 times daily 22   Janessa Perez,    benzocaine (ORAJEL) 10 % mucosal gel Take by mouth as needed.  22   Janessa Perez,    rivaroxaban (XARELTO) 20 MG TABS tablet Take 1 tablet by mouth daily (with breakfast) 22   Randall Clines, DO   losartan (COZAAR) 50 mg tablet Take 1 tablet by mouth daily 22   Randall Clines, DO   ibuprofen (IBU) 800 MG tablet Take 1 tablet by mouth every 8 hours as needed for Pain 22  Randall Clines, DO   potassium chloride (KLOR-CON M) 20 MEQ extended release tablet Take 1 tablet by mouth daily for 5 days 21  Niranjan Scott,       Current Facility-Administered Medications: heparin (porcine) injection 4,000 Units, 4,000 Units, IntraVENous, PRN  heparin (porcine) injection 2,000 Units, 2,000 Units, IntraVENous, PRN  heparin 25,000 units in dextrose 5 % 250 mL infusion (rate based), 5-30 Units/kg/hr, IntraVENous, Continuous  fentaNYL (SUBLIMAZE) injection 50 mcg, 50 mcg, IntraVENous, Once    Allergies:  Adhesive tape    Social History:   reports that she has been smoking cigarettes. She has been smoking an average of .2 packs per day. She has never used smokeless tobacco. She reports current alcohol use. She reports current drug use. Drug: Marijuana Garrel Calender). Family History: family history includes High Blood Pressure in her maternal grandmother and mother. REVIEW OF SYSTEMS:    Constitutional: there has been no unanticipated weight loss. There's been No change in energy level, No change in activity level. Eyes: No visual changes or diplopia. No scleral icterus. ENT: No Headaches  Cardiovascular: As above. Respiratory: No previous pulmonary problems, No cough  Gastrointestinal: No abdominal pain. No change in bowel or bladder habits. Genitourinary: No dysuria, trouble voiding, or hematuria. Musculoskeletal:  No gait disturbance, No weakness or joint complaints. Integumentary: No rash or pruritis. Neurological: No headache, diplopia, change in muscle strength, numbness or tingling. No change in gait, balance, coordination, mood, affect, memory, mentation, behavior. Psychiatric: No anxiety, or depression. Endocrine: No temperature intolerance. No excessive thirst, fluid intake, or urination. No tremor. Hematologic/Lymphatic: No abnormal bruising or bleeding, blood clots or swollen lymph nodes. Allergic/Immunologic: No nasal congestion or hives. PHYSICAL EXAM:      BP (!) 137/110   Pulse 92   Temp 97.9 °F (36.6 °C) (Oral)   Resp 23   Wt 150 lb (68 kg)   SpO2 100%   BMI 24.21 kg/m²    Constitutional and General Appearance: alert, cooperative, no distress and appears stated age  HEENT: PERRL, no cervical lymphadenopathy.  No masses palpable. Normal oral mucosa  Respiratory:  Normal excursion and expansion without use of accessory muscles  Resp Auscultation: Good respiratory effort. No for increased work of breathing. On auscultation: clear to auscultation bilaterally  Cardiovascular: The apical impulse is not displaced  Heart tones are crisp and normal. regular S1 and S2.  Jugular venous pulsation Normal  The carotid upstroke is normal in amplitude and contour without delay or bruit  Peripheral pulses are symmetrical and full   Abdomen:   No masses or tenderness  Bowel sounds present  Extremities:   No Cyanosis or Clubbing   Lower extremity edema: No   Skin: Warm and dry  Neurological:  Alert and oriented. Moves all extremities well  No abnormalities of mood, affect, memory, mentation, or behavior are noted      Labs:     CBC:   Recent Labs     10/22/22  2335   WBC 12.0*   HGB 13.4   HCT 37.9        BMP: No results for input(s): NA, K, CO2, BUN, CREATININE, LABGLOM, GLUCOSE in the last 72 hours. BNP: No results for input(s): BNP in the last 72 hours. PT/INR: No results for input(s): PROTIME, INR in the last 72 hours. APTT:No results for input(s): APTT in the last 72 hours. CARDIAC ENZYMES:No results for input(s): CKTOTAL, CKMB, CKMBINDEX, TROPONINI in the last 72 hours. FASTING LIPID PANEL:No results found for: HDL, LDLDIRECT, LDLCALC, TRIG  LIVER PROFILE:No results for input(s): AST, ALT, LABALBU in the last 72 hours. DATA:    Diagnostics:    EKG: STEMi. ECHO: 10/22/2022  Left ventricle is normal in size, normal wall thickness, global left  ventricular systolic function is normal.  Calculated ejection fraction is 61%. Left atrium is normal in size. Inter-atrial septum is intact with no evidence for an atrial septal defect. Right atrium is normal in size. Trivial mitral regurgitation. Tricuspid valve was not well visualized. Trivial tricuspid regurgitation.   Insignificant tricuspid regurgitation, unable to estimate RVSP      IMPRESSION:    Inferior STEMI with initial presentation with chest pain. History of PE on Xarelto. Hypertension. RECOMMENDATIONS:  Will activate cardiac cath lab on emergent basis. Further orders to follow. Discussed with patient and Nurse. Alejandro Ricketts MD       Cardiovascular Fellow  10/22/2022, 11:56 PM      Attending Physician Statement  I have discussed the case of Carmine Jung including pertinent history and exam findings with the resident. I have seen and examined the patient and the key elements of the encounter have been performed by me. I agree with the assessment, plan and orders as documented by the resident With changes made to the note.      Electronically signed by Alexa Jama MD on 10/23/2022 at 3:11 PM.    [facility]      [phone]

## 2022-10-23 NOTE — PROGRESS NOTES
[facility] CARE DEPARTMENT - [facility]     Emergency/Trauma Note    PATIENT NAME: Ed     Shift date:   Shift day: Saturday   Shift # 3    Room # DIONNE/DIONNE   Name: Ed             Age: 40 y.o. Gender: female          Nondenominational: Congregation   Place of [employer]:     Trauma/Incident type: Stemi Alert  Admit Date & Time: 10/22/2022 11:18 PM  TRAUMA NAME:     ADVANCE DIRECTIVES IN CHART? No    [phone]  RELATIONSHIP OF DECISION MAKER TO PATIENT: spouse    PATIENT/EVENT DESCRIPTION:  Ed  is a 40 y.o. female who arrived via EMS as a Stemi Alert. Pt to be admitted to DIONNE/DIONNE. SPIRITUAL ASSESSMENT-INTERVENTION-OUTCOME:  Dory Byrne was ministry of presence.  met patient's spouse Deandre Peña [phone] in the triage area.  escorted spouse to Cath lab.  provided emotional and spiritual support. PATIENT BELONGINGS:  No belongings noted    ANY BELONGINGS OF SIGNIFICANT VALUE NOTED:      REGISTRATION STAFF NOTIFIED? No      WHAT IS YOUR SPIRITUAL CARE PLAN FOR THIS PATIENT?:   Chaplains are available 24/7 via [employer].     Electronically signed by Karoline Eric on 10/23/2022 at 1:04 AM.  Rainer Chavarria  [phone]

## 2022-10-23 NOTE — PROGRESS NOTES
[facility] [address]  PROGRESS NOTE    Shift date: 10/23/22    Shift day:    Shift # 1    Room # 8753/6087-56   Name: Makayla Dangelo                  Referral: Routine Visit    Admit Date & Time: 10/22/2022 11:18 PM    Assessment:  Makayla Dangelo is a 40 y.o. female in the hospital after almost dying from a heart attack, as stated by patient. Upon entering the room writer observed patient sitting up in bed and her wife on the phone nearby. Patient appeared to be anxious, and through conversation expressed feelings of being overwhelmed and fearful; patient was tearful during visit. Intervention:  Writer introduced self and title as Gabriella Brought offered space for the patient  to express feelings, needs, and concerns and provided a ministry presence.  explored sources of fear and heard from the patient that she almost , and is worried about what that would have meant for her spouse, children, and family. Patient shared her feelings of being overwhelmed and shocked by her condition, and anxious about recovery.  explored sources of support and hope and learned that patient feels comforted and cared for by her family, and trusts their help in the days ahead.  offered words of assurance through prayer and blessing. Outcome:  Patient appeared to be less anxious through  presence, engaged in conversation, and expressed gratitude for the visit. Plan:  Chaplains will remain available to offer spiritual and emotional support as needed. 10/23/22 1059   Encounter Summary   Service Provided For: Patient   [facility]; Children   Last Encounter  10/23/22   Complexity of Encounter Moderate   Begin Time 1040   End Time  1000   Total Time Calculated 1400 min   Spiritual/Emotional needs   Type Spiritual Support   Assessment/Intervention/Outcome   Assessment Anxious; Fearful;Tearful   Intervention Active listening;Explored/Affirmed feelings, thoughts, concerns;Explored Coping Skills/Resources;Sustaining Presence/Ministry of presence;Prayer (assurance of)/[place]   Outcome Comfort;Engaged in conversation;Expressed Gratitude   Plan and Referrals   Plan/Referrals Continue Support (comment)     Electronically signed by Luciano Merritt, on 10/23/2022 at 11:00 AM.  [facility]  [phone]

## 2022-10-23 NOTE — CARE COORDINATION
10/23/22 1058   Service Assessment   Patient Orientation Alert and Oriented   Cognition Alert   History Provided By Patient   Primary Caregiver Self   Support Systems Spouse/Significant Other;Children   Patient's Healthcare Decision Maker is: Legal Next of Kin   PCP Verified by CM No  (No PCP)   Prior Functional Level Independent in ADLs/IADLs   Can patient return to prior living arrangement Yes   Ability to make needs known: Good   Family able to assist with home care needs: Yes   Financial Resources Medicaid   Social/Functional History   Lives With Spouse; Other (comment)  (Children)   Type of [address] One level   Home Access Stairs to enter without rails   Entrance Stairs - Number of Steps 6   Bathroom Shower/Tub Tub/Shower unit   Bathroom Toilet Standard   Home Equipment   (None)   Active  No  (Uses cab)   Discharge Planning   Type of Residence House   Living Arrangements Spouse/Significant Other;Children   Current Services Prior To Admission None   Potential Assistance Purchasing Medications No   Type of Home Care Services None   Patient expects to be discharged to: House   One/Two Story Residence One story   History of falls? 0   Services At/After Discharge   Transition of Care Consult (CM Consult) Discharge Planning   Services At/After Discharge None   Mode of Transport at Discharge Other (see comment)  (May need transportation.)   Condition of Participation: Discharge Planning   The Plan for Transition of Care is related to the following treatment goals: Pt's goal is to return home. Pt's plan is to home, needs denied, may need transportation, preferred pharmacy is [facility], demographics updated with dtr's contact information, pt states dtr is 33, lives with her spouse and 12and 6year old children.

## 2022-10-23 NOTE — PROGRESS NOTES
Physical Therapy  Facility/Department: [facility] CAR 1- SICU  Physical Therapy Initial Assessment    Name: Ayanna Uribe  : 1980  MRN: [id number]  Date of Service: 10/23/2022  Chief Complaint   Patient presents with    Chest Pain      Discharge Recommendations:  Patient would benefit from continued therapy after discharge   PT Equipment Recommendations  Equipment Needed: No (CTA)      Patient Diagnosis(es): The encounter diagnosis was ST elevation myocardial infarction (STEMI), unspecified artery ([facility] [address] Past Medical History:  has a past medical history of DVT (deep vein thrombosis) in pregnancy, H/O blood clots, and Other emphysema ([facility] [address] Past Surgical History:  has a past surgical history that includes  section (, ) and Tubal ligation (). Assessment   Body Structures, Functions, Activity Limitations Requiring Skilled Therapeutic Intervention: Decreased functional mobility ; Decreased strength;Decreased endurance;Decreased balance; Increased pain  Assessment: Pt with mobility deficits requiring CGA to ambulate 12 feet with a RW. Pt demonstrates fair stability with ambulation this date but is limited secondary to tachycardia with HR increasing to 138 bpm with ambulation this date. Pt would benefit from additional PT upon discharge to maximize functional independence. Pt will require assistance with mobility upon discharge based on today's session. Therapy Prognosis: Good  Decision Making: Medium Complexity  Requires PT Follow-Up: Yes  Activity Tolerance  Activity Tolerance: Patient limited by endurance;Treatment limited secondary to medical complications  Activity Tolerance Comments: Limited secondary to tachycardia with HR increasing to 138 bpm with ambulation.      Plan   Physcial Therapy Plan  General Plan:  (5-6x/week)  Current Treatment Recommendations: Strengthening, Balance training, Functional mobility training, Transfer training, Gait training, Patient/Caregiver education & training, Safety education & training, Home exercise program, Equipment evaluation, education, & procurement, Therapeutic activities, Endurance training, Stair training  Safety Devices  Type of Devices: Call light within reach, Chair alarm in place, Left in chair, Gait belt, Nurse notified  Restraints  Restraints Initially in Place: No     Restrictions  Restrictions/Precautions  Restrictions/Precautions: Up as Tolerated  Required Braces or Orthoses?: No  Position Activity Restriction  Other position/activity restrictions: Up with assistance; s/p cath     Subjective   Pain: 6/10 in chest and back at rest  General  Patient assessed for rehabilitation services?: Yes  Response To Previous Treatment: Not applicable  Family / Caregiver Present: No  Follows Commands: Within Functional Limits  Subjective  Subjective: Pt supine in bed and agreeable to therapy, RN agreeable to therapy. Pt pleasant and cooperative throughout today's session. Pt reports 6/10 chest pain at rest.         Social/Functional History  Social/Functional History  Lives With: Spouse, Other (comment) (Children)  Type of Home: House  Home Layout: One level  Home Access: Stairs to enter without rails  Entrance Stairs - Number of Steps: 6  Bathroom Shower/Tub: Tub/Shower unit  Bathroom Toilet: Standard  Bathroom Equipment:  (reports none)  Home Equipment:  (None)  ADL Assistance: Independent  Homemaking Assistance: Independent  Homemaking Responsibilities: Yes  Ambulation Assistance: Independent  Transfer Assistance: Independent  Active : No (Uses cab)  Mode of Transportation: [facility]  Occupation: Full time employment  Type of Occupation:   Leisure & Hobbies: spending time with grandkids, cooking  Additional Comments: Pt reports having friends/ family that would be able to assist as needed.   Vision/Hearing  Vision  Vision: Impaired  Vision Exceptions: Wears glasses for distance;Wears glasses for reading  Hearing  Hearing: Within functional limits Cognition   Orientation  Overall Orientation Status: Within Functional Limits  Orientation Level: Oriented X4  Cognition  Overall Cognitive Status: WFL     Objective                 AROM RLE (degrees)  RLE AROM: WFL  AROM LLE (degrees)  LLE AROM : WFL  AROM RUE (degrees)  RUE AROM : WFL  AROM LUE (degrees)  LUE AROM : WFL  Strength RLE  Strength RLE: WFL  Comment: Grossly 4/5  Strength LLE  Strength LLE: WFL  Comment: Grossly 4/5  Strength RUE  Comment: Co-eval with OT, see OT note for UE detail. Strength LUE  Comment: Co-eval with OT, see OT note for UE detail. Bed mobility  Supine to Sit: Stand by assistance  Sit to Supine:  (Pt retired to bedside recliner upon exit)  Scooting: Stand by assistance  Bed Mobility Comments: HOB ~30 degrees; significant time/effort required d/t pain and fatigue  Transfers  Sit to Stand: Contact guard assistance  Stand to Sit: Contact guard assistance  Comment: Transfers performed 2x this date. Ambulation  Surface: Level tile  Device: Rolling Walker  Assistance: Contact guard assistance  Quality of Gait: fair stability, decreased stride length, no LOB. Gait Deviations: Slow Doretha;Decreased step length  Distance: 4 feet, seated rest break, 12 feet. Comments: Ambulation distance limited secondary to tachycardia with HR increasing to 138 bpm with ambulation.   More Ambulation?: No  Stairs/Curb  Stairs?: No     Balance  Posture: Fair  Sitting - Static: Good;-  Sitting - Dynamic: Fair;+  Standing - Static: Fair  Standing - Dynamic: Fair  Comments: standing balance assessed while using a RW                                            AM-PAC Score  AM-PAC Inpatient Mobility Raw Score : 19 (10/23/22 1447)  AM-PAC Inpatient T-Scale Score : 45.44 (10/23/22 1447)  Mobility Inpatient CMS 0-100% Score: 41.77 (10/23/22 1447)  Mobility Inpatient CMS G-Code Modifier : CK (10/23/22 1447)         Goals  Short Term Goals  Time Frame for Short Term Goals: 14 visits  Short Term Goal 1: Pt will ambulate 300 feet with no device and supervision to increase functional independence. Short Term Goal 2: Pt will perform sit<>stand transfer with supervision. Short Term Goal 3: Pt will demonstrate good- standing balance to decrease fall risk. Short Term Goal 4: Pt will tolerate  a 35 minute therapy session to promote increased endurance. Short Term Goal 5: Pt will negotiate 6 stairs with no handrails and SBA to allow the pt to enter prior living arrangements. Additional Goals?: No       Education  Patient Education  Education Given To: Patient  Education Provided: Role of Therapy; Equipment;Transfer Training;Plan of Care  Education Method: Verbal  Barriers to Learning: None  Education Outcome: Verbalized understanding      Therapy Time   Individual Concurrent Group Co-treatment   Time In 0819         Time Out 0854         Minutes 35         Timed Code Treatment Minutes: 8 Minutes       Rachel Newton, PT

## 2022-10-23 NOTE — ED PROVIDER NOTES
[facility] CAR 1- SICU  Emergency Department Encounter  EmergencyMedicine Resident     Pt Name:Natalia Portillo  MRN: [id number]  [place] 1980  Date of evaluation: 10/22/22  PCP:  No primary care provider on file. This patient was evaluated in the Emergency Department for symptoms described in the history of present illness. CHIEF COMPLAINT       Chief Complaint   Patient presents with    Chest Pain       HISTORY OF PRESENT ILLNESS  (Location/Symptom, Timing/Onset, Context/Setting, Quality, Duration, Modifying Factors, Severity.)      Gaby Crystal is a 40 y.o. female who presents via EMS with chest pain. 20 minutes prior to arrival patient had sudden onset chest pain, substernal, nonradiating, associated nausea and shortness of breath. Twelve-lead EKG per EMS showed inferior STEMI. EKG sent to interventional cardiologist.  Upon initial evaluation of patient in the emergency department, tearful, anxious, endorsing substernal chest pain, nausea/vomiting. Endorses history of DVT, not currently on anticoagulation. PAST MEDICAL / SURGICAL / SOCIAL / FAMILY HISTORY      has a past medical history of DVT (deep vein thrombosis) in pregnancy, H/O blood clots, and Other emphysema ([facility] [address] has a past surgical history that includes  section (, ) and Tubal ligation (). Social History     Socioeconomic History    Marital status: Single     Spouse name: Not on file    Number of children: Not on file    Years of education: Not on file    Highest education level: Not on file   Occupational History    Not on file   Tobacco Use    Smoking status: Every Day     Packs/day: 0.20     Types: Cigarettes    Smokeless tobacco: Never   Substance and Sexual Activity    Alcohol use:  Yes     Alcohol/week: 0.0 standard drinks     Comment: socially     Drug use: Yes     Types: Marijuana Daril Gerardo)     Comment: socially    Sexual activity: Yes     Partners: Male   Other Topics Concern    Not on file   Social History Narrative    Not on file     Social Determinants of Health     Financial Resource Strain: Not on file   Food Insecurity: Not on file   Transportation Needs: Not on file   Physical Activity: Not on file   Stress: Not on file   Social Connections: Not on file   Intimate Partner Violence: Not on file   Housing Stability: Not on file       Family History   Problem Relation Age of Onset    High Blood Pressure Mother     High Blood Pressure Maternal Grandmother        Allergies:    Adhesive tape    Home Medications:  Prior to Admission medications    Medication Sig Start Date End Date Taking? Authorizing Provider   acetaminophen (TYLENOL) 500 MG tablet Take 2 tablets by mouth 3 times daily  Patient not taking: Reported on 10/23/2022 7/9/22   Simon Marcus DO   benzocaine (ORAJEL) 10 % mucosal gel Take by mouth as needed. Patient not taking: Reported on 10/23/2022 7/9/22   Simon Marcus DO   rivaroxaban (XARELTO) 20 MG TABS tablet Take 1 tablet by mouth daily (with breakfast) 6/5/22   Yrn White, DO   losartan (COZAAR) 50 mg tablet Take 1 tablet by mouth daily 6/5/22   Yrn White, DO   ibuprofen (IBU) 800 MG tablet Take 1 tablet by mouth every 8 hours as needed for Pain 6/5/22 7/9/22  Yrn White, DO   potassium chloride (KLOR-CON M) 20 MEQ extended release tablet Take 1 tablet by mouth daily for 5 days 4/19/21 6/5/22  Sherry Scott, DO       REVIEW OF SYSTEMS    (2-9 systems for level 4, 10 or more for level 5)    Review of Systems   Constitutional:  Negative for chills and fever. HENT:  Negative for congestion. Eyes:  Negative for visual disturbance. Respiratory:  Positive for shortness of breath. Negative for cough. Cardiovascular:  Positive for chest pain. Gastrointestinal:  Positive for nausea. Negative for abdominal pain, constipation, diarrhea and vomiting. Genitourinary:  Negative for difficulty urinating, dysuria, vaginal bleeding and vaginal discharge. Musculoskeletal:  Negative for back pain. Skin:  Negative for wound. Neurological:  Negative for weakness, numbness and headaches. Psychiatric/Behavioral:  The patient is nervous/anxious. PHYSICAL EXAM   (up to 7 for level 4, 8 or more for level 5)    INITIAL VITALS:   BP (!) 141/99   Pulse (!) 103   Temp 98.1 °F (36.7 °C) (Oral)   Resp 17   Ht 5' 2\" (1.575 m)   Wt 151 lb 0.2 oz (68.5 kg)   SpO2 100%   BMI 27.62 kg/m²   I have reviewed the triage vital signs. Const: Acute distress  Eyes: PERRL, no conjunctival injection  HENT: NCAT, Neck supple without meningismus   CV: RRR, Warm, well-perfused extremities. +2/4 DP and radial pulses bilaterally  RESP: CTAB, Unlabored respiratory effort  GI: soft, non-tender, non-distended, no masses  MSK: No gross deformities appreciated  Skin: Warm, dry. No rashes  Neuro: Alert, CNs II-XII grossly intact. Sensation and motor function of extremities grossly intact. Psych: Anxious, tearful      DIFFERENTIAL  DIAGNOSIS   DDX:  ACS/MI    Initial MDM:  50-year-old female with acute onset chest pain. STEMI per EMS revealing inferior MI.  EMS EKG sent to interventional cardiologist.  Repeat EKG in the emergency department sent to interventional cardiologist.  Significant for inferior STEMI. Cath Lab activated. Heparin drip started. Will take patient to Cath Lab. PLAN (LABS / IMAGING / EKG):  Orders Placed This Encounter   Procedures    XR CHEST PORTABLE    CBC with Auto Differential    Troponin    SPECIMEN REJECTION    Basic Metabolic Panel    PREVIOUS SPECIMEN    APTT    Basic Metabolic Panel    Basic Metabolic Panel w/ Reflex to MG    CBC    CBC with Auto Differential    Troponin    ADULT DIET;  Regular; Low Fat/Low Chol/High Fiber/2 gm Na    Vital signs per unit routine    Up as tolerated    Up with assistance    Daily weights    Intake and output    Telemetry monitoring - 48 hour duration    Notify physician    Place intermittent pneumatic compression device    Vital signs    Wound care    Tobacco cessation education    Check Cath Site and Distal Pulses    Puncture site care    Verify metformin (GLUCOPHAGE) Discontinued    Nursing communication for POCT - activated clotting time    If any sign of bleeding or hematoma at puncture site do the following:    Maintain Dry Sterile Dressing    Telemetry monitoring - 48 hour duration    Ambulate patient    Strict Bedrest    Notify physician for    Notify physician - hemoglobin    Full Code    Inpatient consult to Case Management    Inpatient consult to Phase 1 Cardiac Rehab    Inpatient consult to Phase 2 Cardiac Rehab    OT eval and treat    PT evaluation and treat    Initiate Oxygen Therapy Protocol    Pulse oximetry, continuous    Initiate Oxygen Therapy Protocol    EKG 12 Lead    EKG 12 lead    ECHO Complete 2D W Doppler W Color    ADMIT TO INPATIENT       MEDICATIONS ORDERED:  Orders Placed This Encounter   Medications    fentaNYL (SUBLIMAZE) injection 50 mcg    heparin (porcine) injection 4,000 Units    heparin (porcine) injection 4,000 Units    heparin (porcine) injection 2,000 Units    heparin 25,000 units in dextrose 5 % 250 mL infusion (rate based)    fentaNYL (SUBLIMAZE) injection 50 mcg    sodium chloride flush 0.9 % injection 10 mL    sodium chloride flush 0.9 % injection 10 mL    0.9 % sodium chloride infusion    OR Linked Order Group     ondansetron (ZOFRAN-ODT) disintegrating tablet 4 mg     ondansetron (ZOFRAN) injection 4 mg    polyethylene glycol (GLYCOLAX) packet 17 g    OR Linked Order Group     acetaminophen (TYLENOL) tablet 650 mg     acetaminophen (TYLENOL) suppository 650 mg    aspirin chewable tablet 81 mg    atorvastatin (LIPITOR) tablet 80 mg    hydrALAZINE (APRESOLINE) injection 10 mg    morphine injection 2 mg    sodium chloride flush 0.9 % injection 5-40 mL    sodium chloride flush 0.9 % injection 5-40 mL    0.9 % sodium chloride infusion    acetaminophen (TYLENOL) tablet 650 mg ticagrelor (BRILINTA) tablet 90 mg    carvedilol (COREG) tablet 6.25 mg    potassium chloride 10 mEq/100 mL IVPB (Peripheral Line)         DIAGNOSTIC RESULTS / EMERGENCY DEPARTMENT COURSE / MDM   LAB RESULTS:  Results for orders placed or performed during the hospital encounter of 10/22/22   CBC with Auto Differential   Result Value Ref Range    WBC 12.0 (H) 3.5 - 11.3 k/uL    RBC 4.22 3.95 - 5.11 m/uL    Hemoglobin 13.4 11.9 - 15.1 g/dL    Hematocrit 37.9 36.3 - 47.1 %    MCV 89.8 82.6 - 102.9 fL    MCH 31.8 25.2 - 33.5 pg    MCHC 35.4 (H) 28.4 - 34.8 g/dL    RDW 18.2 (H) 11.8 - 14.4 %    Platelets 260 620 - 584 k/uL    MPV 11.8 8.1 - 13.5 fL    NRBC Automated 0.0 0.0 per 100 WBC    Immature Granulocytes 0 0 %    Seg Neutrophils 28 (L) 36 - 66 %    Lymphocytes 59 (H) 24 - 44 %    Monocytes 7 1 - 7 %    Eosinophils % 6 (H) 1 - 4 %    Basophils 0 0 - 2 %    Absolute Immature Granulocyte 0.00 0.00 - 0.30 k/uL    Segs Absolute 3.36 1.8 - 7.7 k/uL    Absolute Lymph # 7.08 (H) 1.0 - 4.8 k/uL    Absolute Mono # 0.84 (H) 0.1 - 0.8 k/uL    Absolute Eos # 0.72 (H) 0.0 - 0.4 k/uL    Basophils Absolute 0.00 0.0 - 0.2 k/uL    Morphology Normal    Troponin   Result Value Ref Range    Troponin, High Sensitivity 10 0 - 14 ng/L   APTT   Result Value Ref Range    PTT 23.3 20.5 - 30.5 sec   SPECIMEN REJECTION   Result Value Ref Range    Specimen Source . BLOOD     Ordered Test BMP     Reason for Rejection Unable to perform testing: Specimen hemolyzed.     Basic Metabolic Panel   Result Value Ref Range    Glucose 106 (H) 70 - 99 mg/dL    BUN 12 6 - 20 mg/dL    Creatinine 0.55 0.50 - 0.90 mg/dL    Est, Glom Filt Rate >60 >60 mL/min/1.73m2    Calcium 8.2 (L) 8.6 - 10.4 mg/dL    Sodium 140 135 - 144 mmol/L    Potassium 3.1 (L) 3.7 - 5.3 mmol/L    Chloride 101 98 - 107 mmol/L    CO2 25 20 - 31 mmol/L    Anion Gap 14 9 - 17 mmol/L   EKG 12 lead   Result Value Ref Range    Ventricular Rate 83 BPM    Atrial Rate 83 BPM    P-R Interval 188 ms QRS Duration 86 ms    Q-T Interval 428 ms    QTc Calculation (Bazett) 502 ms    P Axis 68 degrees    R Axis 68 degrees    T Axis 19 degrees       Leukocytosis, consistent with acute STEMI event. Hypokalemic, unable to replace in the emergency department, taking emergently to Cath Lab. EKG  Rhythm: sinus tachycardia  Rate: tachycardia  Axis: normal  Ectopy: none  Conduction: normal  ST Segments: elevation in  II, III, and aVf. Depression in aVL, V1, V2  T Waves: non specific changes  Q Waves: none    EKG  Impression: Inferior STEMI    All EKG's are interpreted by the Emergency Department Physician who either signs or Co-signs this chart in the absence of a cardiologist.    CONSULTS:  IP CONSULT TO CASE MANAGEMENT  IP CONSULT TO CARDIAC REHAB  IP CONSULT TO CARDIAC REHAB      MDM/EMERGENCY DEPARTMENT COURSE:  Patient remained hemodynamically stable. Heparin drip started. Patient transported to Cath Lab without incident. CRITICAL CARE:  Please see Attending Note    FINAL IMPRESSION      1. ST elevation myocardial infarction (STEMI), unspecified artery ([facility] [address]          DISPOSITION / PLAN     DISPOSITION Admitted 10/23/2022 12:01:23 AM    PATIENT REFERRED TO:  No follow-up provider specified.     DISCHARGE MEDICATIONS:  Current Discharge Medication List          Miguel Villagran DO  Emergency Medicine Resident, PGY 2    (Please note that portions of this note were completed with a voice recognition program.  Efforts were made to edit the dictations but occasionally words are mis-transcribed.)       Miguel Villagran DO  Resident  10/23/22 0492

## 2022-10-23 NOTE — ED PROVIDER NOTES
[facility]   Emergency Department  Faculty Attestation       I performed a history and physical examination of the patient and discussed management with the resident. I reviewed the residents note and agree with the documented findings including all diagnostic interpretations and plan of care. Any areas of disagreement are noted on the chart. I was personally present for the key portions of any procedures. I have documented in the chart those procedures where I was not present during the key portions. I have reviewed the emergency nurses triage note. I agree with the chief complaint, past medical history, past surgical history, allergies, medications, social and family history as documented unless otherwise noted below. Documentation of the HPI, Physical Exam and Medical Decision Making performed by scribrolan is based on my personal performance of the HPI, PE and MDM. For Physician Assistant/ Nurse Practitioner cases/documentation I have personally evaluated this patient and have completed at least one if not all key elements of the E/M (history, physical exam, and MDM). Additional findings are as noted. Pertinent Comments     Primary Care Physician: No primary care provider on file. ED Triage Vitals   BP Temp Temp Source Heart Rate Resp SpO2 Height Weight   10/22/22 2321 10/22/22 2326 10/22/22 2326 10/22/22 2321 -- 10/22/22 2321 -- 10/22/22 2327   (!) 137/110 97.9 °F (36.6 °C) Oral 96  98 %  150 lb (68 kg)          This is a 40 y.o. female who presents to the Emergency Department w/ acute onset chest pain. EKG in the field concerning for inferior STEMI. Patient in severe discomfort, rocking back and forth. Talking in full sentences. Heart sounds regular and lungs clear. No significant pitting edema. Alert and oriented with no focal deficits. 1. Time of EKG: Initial EMS 11:10  2. Time EKG seen by attending physician: 11:10  3. Time STEMI Alert called: 11:11  4.  Time Cardiologist paged: 11:11  5. Time Cardiologist return call: 11:28  6. Name of Cardiologist:Dr. Monica Jimenez  7. Any changes to STEMI Alert by cardiologist: None  (Repeat EKG done at 23:21 and repaged the cardiologist at the time). X-ray with no mediastinal widening. Heparin given. EKG Interpretation    Interpreted by emergency department physician    Clinical Impression: Sinus tachycardia w/ ST elevation in inferior leads with depressions in aVL. Consistent with inferior MI    Nick Childs MD        Critical Care: There was significant risk of life threatening deterioration of patient's condition requiring my direct management. Critical care time 20 minutes, excluding any documented procedures.     Nick Childs MD  Attending Emergency Physician         Nick Childs MD  10/22/22 5619

## 2022-10-23 NOTE — OP NOTE
[facility]    CARDIAC CATHETERIZATION    Date:   10/23/2022  Patient name:  Ruth Ann Torres  Date of admission:  10/22/2022 11:18 PM  MRN:   [id number]  YOB: 1980    Operators:  Primary:   Kimberlee Page MD (Attending Physician)    Assistant/CV fellow:        Procedure performed:       [x] Left Heart Catheterization. [] Graft Angiography.  [] Left Ventriculography. [] Right Heart Catheterization. [x] Coronary Angiography. [] Aortic Valve Studies. [x] PCI:      [] Other:       Pre Procedure Conscious Sedation Data:  ASA Class:    [] I [] II [x] III [] IV    Mallampati Class:  [] I [] II [x] III [] IV      Indication:  [x] STEMI      [] + Stress test  [] ACS      [] + EKG Changes  [] Non Q MI       [] Significant Risk Factors  [] Recurrent Angina             [] Diabetes Mellitus    [] New LBBB      [] Uncontrolled HTN. [] CHF / Low EF changes     [] Abnormal CTA / Ca Score      Procedure:  Access:  [x] Femoral  [] Radial  artery       [x] Right  [] Left    Procedure: After informed consent was obtained with explanation of the risks and benefits, patient was brought to the cath lab. The access area was prepped and draped in sterile fashion. 1% lidocaine was used for local block. The artery was cannulated with 6  Fr sheath with brisk arterial blood return. The side port was frequently flushed and aspirated with normal saline. Findings:    LMCA: Normal 0% stenosis. LAD: Normal 0% stenosis. LCx: Normal 0% stenosis. RCA: Multiple stenosis. Lesion on Mid RCA: 99% stenosis 34 mm length reduced to 0%. Pre procedure    LEELEE II flow was noted. Post Procedure LEELEE III flow was present. Good    runoff was present. The lesion was diagnosed as High Risk (C)       Lesion on Dist RCA: 80% stenosis 34 mm length reduced to 0%. Pre    procedure LEELEE III flow was noted. Post Procedure LEELEE III flow was    present. Good runoff was present.  The lesion was diagnosed as Moderate    Risk (B). Coronary Tree      Dominance: Right     LV Analysis  LV function assessed as:Normal.  Ejection Fraction  +----------------------------------------------------------------------+---+  ! Method                                                                ! EF%! +----------------------------------------------------------------------+---+  ! LV gram                                                               !50 !  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 Successful PCI / Drug Eluting Stent of the mid and distal RCA. Normal LAD and LCX. Borderline normal LV systolic function with inferior basal akinesis. Recommendations:      Routine Post MI/Stent Orders. Medical therapy as needed. Risk factor modification. ____________________________________________________________________    History and Risk Factors    [x] Hypertension     [] Family history of CAD  [] Hyperlipidemia     [] Cerebrovascular Disease   [] Prior MI       [] Peripheral Vascular disease   [] Prior PCI              [] Diabetes Mellitus    [] Left Main PCI. [] Currently on Dialysis. [] Prior CABG. [] Currently smoker. [] Cardiac Arrest outside of healthcare facility. [] Yes    [x] No        Witnessed     [] Yes   [] No     Arrest after arrival of EMS  [] Yes   [] No     [] Cardiac Arrest at other Facility. [] Yes   [x] No    Pre-Procedure Information. Heart Failure       [] Yes    [x] No        Class  [] I      [] II  [] III    [] IV. New Diagnosis    [] Yes  [] No    HF Type      [] Systolic   [] Diastolic          [] Unknown. Diagnostic Test:   EKG       [] Normal   [x] Abnormal    New antiarrhythmia medications:    [] Yes   [] No   New onset atrial fibrillation / Flutter     [] Yes   [] No   ECG Abnormalities:      [] V. Fib   [] Allison V.  Tach [] NS V. T   [] New LBBB           [] T. Inv  []  ST dev > 0.5 mm         [] PVC's freq  [] PVC's infrequent    Stress Test Performed:      [] Yes    [x] No     Type:     [] Stress Echo   [] Exercise Stress Test (no imaging)      [] Stress Nuclear  [] Stress Imaging     Results   [] Negative   [] Positive        [] Indeterminate  [] Unavailable     If Positive/ Risk / Extent of Ischemia:       [] Low  [] Intermediate         [] High  [] Unavailable      Cardiac CTA Performed:     [] Yes    [x] No      Results   [] CAD   [] Non obstructive CAD      [] No CAD   [] Uncertain      [] Unknown   [] Structural Disease. Pre Procedure Medications:   [] Yes    [] No         [x] ASA   [] Beta Blockers      [] Nitrate   [] Ca Channel Blockers      [] Ranolazine   [] Statin       [] Plavix/Others antiplatelets        Prince Ivy MD       Cardiovascular Fellow  10/23/2022, 12:03 AM      Physician Statement  I have discussed the case of Roberto Carlos Hahn including pertinent history and exam findings with the resident. I have seen and examined the patient and the key elements of the encounter have been performed by me. I agree with the assessment, plan and orders as documented by the resident With changes made to the note. Procedure performed by me.     Electronically signed by Sunita Gomez MD on 10/23/2022 at 3:11 PM.    [facility]      [phone]

## 2022-10-24 VITALS
RESPIRATION RATE: 16 BRPM | WEIGHT: 151.01 LBS | HEIGHT: 62 IN | DIASTOLIC BLOOD PRESSURE: 100 MMHG | OXYGEN SATURATION: 96 % | TEMPERATURE: 99.3 F | HEART RATE: 103 BPM | SYSTOLIC BLOOD PRESSURE: 146 MMHG | BODY MASS INDEX: 27.79 KG/M2

## 2022-10-24 LAB
ABSOLUTE EOS #: 0 K/UL (ref 0–0.4)
ABSOLUTE IMMATURE GRANULOCYTE: 0 K/UL (ref 0–0.3)
ABSOLUTE LYMPH #: 5.77 K/UL (ref 1–4.8)
ABSOLUTE MONO #: 0.55 K/UL (ref 0.1–0.8)
ANION GAP SERPL CALCULATED.3IONS-SCNC: 11 MMOL/L (ref 9–17)
BASOPHILS # BLD: 0 % (ref 0–2)
BASOPHILS ABSOLUTE: 0 K/UL (ref 0–0.2)
BUN BLDV-MCNC: 6 MG/DL (ref 6–20)
CALCIUM SERPL-MCNC: 9.1 MG/DL (ref 8.6–10.4)
CHLORIDE BLD-SCNC: 103 MMOL/L (ref 98–107)
CO2: 21 MMOL/L (ref 20–31)
CREAT SERPL-MCNC: 0.4 MG/DL (ref 0.5–0.9)
EKG ATRIAL RATE: 101 BPM
EKG ATRIAL RATE: 83 BPM
EKG P AXIS: 68 DEGREES
EKG P AXIS: 71 DEGREES
EKG P-R INTERVAL: 188 MS
EKG P-R INTERVAL: 194 MS
EKG Q-T INTERVAL: 352 MS
EKG Q-T INTERVAL: 428 MS
EKG QRS DURATION: 74 MS
EKG QRS DURATION: 86 MS
EKG QTC CALCULATION (BAZETT): 456 MS
EKG QTC CALCULATION (BAZETT): 502 MS
EKG R AXIS: 68 DEGREES
EKG R AXIS: 72 DEGREES
EKG T AXIS: 101 DEGREES
EKG T AXIS: 19 DEGREES
EKG VENTRICULAR RATE: 101 BPM
EKG VENTRICULAR RATE: 83 BPM
EOSINOPHILS RELATIVE PERCENT: 0 % (ref 1–4)
GFR SERPL CREATININE-BSD FRML MDRD: >60 ML/MIN/1.73M2
GLUCOSE BLD-MCNC: 101 MG/DL (ref 70–99)
HCT VFR BLD CALC: 38 % (ref 36.3–47.1)
HEMOGLOBIN: 13.5 G/DL (ref 11.9–15.1)
IMMATURE GRANULOCYTES: 0 %
LV EF: 56 %
LVEF MODALITY: NORMAL
LYMPHOCYTES # BLD: 53 % (ref 24–44)
MCH RBC QN AUTO: 31.2 PG (ref 25.2–33.5)
MCHC RBC AUTO-ENTMCNC: 35.5 G/DL (ref 28.4–34.8)
MCV RBC AUTO: 87.8 FL (ref 82.6–102.9)
MONOCYTES # BLD: 5 % (ref 1–7)
MORPHOLOGY: NORMAL
NRBC AUTOMATED: 0 PER 100 WBC
PARTIAL THROMBOPLASTIN TIME: 48.7 SEC (ref 20.5–30.5)
PARTIAL THROMBOPLASTIN TIME: 55.8 SEC (ref 20.5–30.5)
PDW BLD-RTO: 16.9 % (ref 11.8–14.4)
PLATELET # BLD: 198 K/UL (ref 138–453)
PMV BLD AUTO: 10.8 FL (ref 8.1–13.5)
POTASSIUM SERPL-SCNC: 3.6 MMOL/L (ref 3.7–5.3)
RBC # BLD: 4.33 M/UL (ref 3.95–5.11)
SEG NEUTROPHILS: 42 % (ref 36–66)
SEGMENTED NEUTROPHILS ABSOLUTE COUNT: 4.58 K/UL (ref 1.8–7.7)
SODIUM BLD-SCNC: 135 MMOL/L (ref 135–144)
TROPONIN, HIGH SENSITIVITY: 432 NG/L (ref 0–14)
TROPONIN, HIGH SENSITIVITY: 453 NG/L (ref 0–14)
WBC # BLD: 10.9 K/UL (ref 3.5–11.3)

## 2022-10-24 PROCEDURE — 2580000003 HC RX 258: Performed by: STUDENT IN AN ORGANIZED HEALTH CARE EDUCATION/TRAINING PROGRAM

## 2022-10-24 PROCEDURE — 93010 ELECTROCARDIOGRAM REPORT: CPT | Performed by: INTERNAL MEDICINE

## 2022-10-24 PROCEDURE — 93306 TTE W/DOPPLER COMPLETE: CPT

## 2022-10-24 PROCEDURE — 85025 COMPLETE CBC W/AUTO DIFF WBC: CPT

## 2022-10-24 PROCEDURE — 85730 THROMBOPLASTIN TIME PARTIAL: CPT

## 2022-10-24 PROCEDURE — 84484 ASSAY OF TROPONIN QUANT: CPT

## 2022-10-24 PROCEDURE — 36415 COLL VENOUS BLD VENIPUNCTURE: CPT

## 2022-10-24 PROCEDURE — 80048 BASIC METABOLIC PNL TOTAL CA: CPT

## 2022-10-24 PROCEDURE — 6370000000 HC RX 637 (ALT 250 FOR IP): Performed by: STUDENT IN AN ORGANIZED HEALTH CARE EDUCATION/TRAINING PROGRAM

## 2022-10-24 PROCEDURE — 6360000002 HC RX W HCPCS: Performed by: STUDENT IN AN ORGANIZED HEALTH CARE EDUCATION/TRAINING PROGRAM

## 2022-10-24 RX ORDER — ASPIRIN 81 MG/1
81 TABLET, CHEWABLE ORAL DAILY
Qty: 30 TABLET | Refills: 3 | Status: SHIPPED | OUTPATIENT
Start: 2022-10-25

## 2022-10-24 RX ORDER — LOSARTAN POTASSIUM 25 MG/1
25 TABLET ORAL DAILY
Qty: 30 TABLET | Refills: 3 | Status: SHIPPED | OUTPATIENT
Start: 2022-10-25

## 2022-10-24 RX ORDER — CARVEDILOL 12.5 MG/1
12.5 TABLET ORAL 2 TIMES DAILY WITH MEALS
Qty: 60 TABLET | Refills: 3 | Status: SHIPPED | OUTPATIENT
Start: 2022-10-24

## 2022-10-24 RX ORDER — NITROGLYCERIN 0.4 MG/1
0.4 TABLET SUBLINGUAL EVERY 5 MIN PRN
Qty: 25 TABLET | Refills: 3 | Status: SHIPPED | OUTPATIENT
Start: 2022-10-24

## 2022-10-24 RX ORDER — ATORVASTATIN CALCIUM 80 MG/1
80 TABLET, FILM COATED ORAL NIGHTLY
Qty: 30 TABLET | Refills: 3 | Status: SHIPPED | OUTPATIENT
Start: 2022-10-24

## 2022-10-24 RX ORDER — ISOSORBIDE MONONITRATE 30 MG/1
30 TABLET, EXTENDED RELEASE ORAL DAILY
Qty: 30 TABLET | Refills: 3 | Status: SHIPPED | OUTPATIENT
Start: 2022-10-24

## 2022-10-24 RX ADMIN — ASPIRIN 81 MG: 81 TABLET, CHEWABLE ORAL at 08:38

## 2022-10-24 RX ADMIN — HEPARIN SODIUM 2000 UNITS: 1000 INJECTION INTRAVENOUS; SUBCUTANEOUS at 04:33

## 2022-10-24 RX ADMIN — TICAGRELOR 90 MG: 90 TABLET ORAL at 08:38

## 2022-10-24 RX ADMIN — SODIUM CHLORIDE, PRESERVATIVE FREE 10 ML: 5 INJECTION INTRAVENOUS at 08:39

## 2022-10-24 RX ADMIN — LOSARTAN POTASSIUM 25 MG: 25 TABLET, FILM COATED ORAL at 08:38

## 2022-10-24 RX ADMIN — CARVEDILOL 12.5 MG: 12.5 TABLET, FILM COATED ORAL at 08:38

## 2022-10-24 NOTE — PROGRESS NOTES
Patient discharged with all belongings, informed on discharged instructions. Patient walked to outpatient pharmacy with elmer to  medications.  Patient reminded to follow up with PCP in one week at [facility]

## 2022-10-24 NOTE — PLAN OF CARE
Problem: Discharge Planning  Goal: Discharge to home or other facility with appropriate resources  10/24/2022 1356 by Cm Uriarte RN  Outcome: Completed  10/24/2022 0855 by Xiomara Camargo RN  Outcome: Progressing  10/24/2022 0854 by Xiomara Camargo RN  Outcome: Progressing  Flowsheets (Taken 10/24/2022 0800)  Discharge to home or other facility with appropriate resources: Identify barriers to discharge with patient and caregiver     Problem: Pain  Goal: Verbalizes/displays adequate comfort level or baseline comfort level  10/24/2022 1356 by Cm Uriarte RN  Outcome: Completed  10/24/2022 0855 by Xiomara Camargo RN  Outcome: Progressing  10/24/2022 0854 by Xiomara Camargo RN  Outcome: Progressing

## 2022-10-24 NOTE — PLAN OF CARE
Problem: Discharge Planning  Goal: Discharge to home or other facility with appropriate resources  10/24/2022 0855 by Deyanne Castleman, RN  Outcome: Progressing  10/24/2022 0854 by Deyanne Castleman, RN  Outcome: Progressing     Problem: Pain  Goal: Verbalizes/displays adequate comfort level or baseline comfort level  10/24/2022 0855 by Deyanne Castleman, RN  Outcome: Progressing  10/24/2022 0854 by Deyanne Castleman, RN  Outcome: Progressing

## 2022-10-24 NOTE — DISCHARGE SUMMARY
[facility]  Discharge Note                 Name:  Dougie Guadarrama  YOB: 1980  Social Security Number:  [id number]  Medical Record Number:  [id number]    Date of Admission:  10/22/2022  Date of Discharge:  10/24/2022    Admitting physician: Soledad Bolton MD    Discharge Attending: KELSEA Nuñez NP, CNP  Primary Care Physician: No primary care provider on file. Consultants: none   Discharge to home in stable condition     HOSPITAL ADMISSION PROBLEM LIST:  Patient Active Problem List   Diagnosis    Essential hypertension, benign    History of blood clots    Smoker    History of chlamydia    Right leg swelling    Acute deep vein thrombosis (DVT) of femoral vein of both lower extremities (HCC)    DVT of deep femoral vein, bilateral (HCC)    Right pulmonary embolus (HCC)    Chest pain on breathing    Anxiety    Non compliance w medication regimen    Other emphysema (HCC)    Hypokalemia    STEMI (ST elevation myocardial infarction) [facility])         Procedures:cardiac catheterization, echocardiogram    HOSPITAL COURSE :           The patient was admitted for: inferior [facility]  Hospital Procedures if any: cardiac cath , ECHO     Findings:     LMCA: Normal 0% stenosis. LAD: Normal 0% stenosis. LCx: Normal 0% stenosis. RCA: Multiple stenosis. Lesion on Mid RCA: 99% stenosis 34 mm length reduced to 0%. Pre procedure    LEELEE II flow was noted. Post Procedure LEELEE III flow was present. Good    runoff was present. The lesion was diagnosed as High Risk (C)       Lesion on Dist RCA: 80% stenosis 34 mm length reduced to 0%. Pre    procedure LEELEE III flow was noted. Post Procedure LEELEE III flow was    present. Good runoff was present. The lesion was diagnosed as Moderate    Risk (B). Coronary Tree      Dominance: Right     LV Analysis  LV function assessed as:Normal.  Ejection Fraction  +----------------------------------------------------------------------+---+  ! Method !EF%!  +----------------------------------------------------------------------+---+  ! LV gram                                                               !50 !  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 Successful PCI / Drug Eluting Stent of the mid and distal RCA. Normal LAD and LCX. Borderline normal LV systolic function with inferior basal akinesis. Recommendations:      Routine Post MI/Stent Orders. Medical therapy as needed. Risk factor modification. Medications changes recommendation: see list   Follow Up Plan:  in 2 weeks       ECHO   CONCLUSIONS     Summary  Left ventricle is normal in size. Global left ventricular systolic function  is normal. Calculated ejection fraction 56% by Heart Model. Normal right ventricular size and function. Mild mitral regurgitation. Signature  ----------------------------------------------------------------------------   Electronically signed by Darling Blank(Sonographer) on 10/24/2022   12:03 PM  ----------------------------------------------------------------------------     ----------------------------------------------------------------------------   Electronically signed by Cameron Roque(Interpreting physician) on   10/24/2022 12:51 PM  ----------------------------------------------------------------------------    Discharge exam:   Vitals:    10/24/22 1210   BP: (!) 146/100   Pulse: (!) 103   Resp: 16   Temp: 99.3 °F (37.4 °C)   SpO2: 96%     Neuro: normal  Chest: Clear to ausculation. No wheezing. Cardiac: Regular rate. s1 and s2 auscultated. No murmur noted.   Abdomen/groin: soft, non-tender, without masses or organomegaly  Lower extremity edema: none    Discharge Medications:     Medication List      START taking these medications aspirin 81 MG chewable tablet; Take 1 tablet by mouth daily; Start   taking on: October 25, 2022   atorvastatin 80 MG tablet; Commonly known as: LIPITOR; Take 1 tablet by   mouth nightly   carvedilol 12.5 MG tablet; Commonly known as: COREG; Take 1 tablet by   mouth 2 times daily (with meals)   isosorbide mononitrate 30 MG extended release tablet; Commonly known as:   IMDUR; Take 1 tablet by mouth daily   nitroGLYCERIN 0.4 MG SL tablet; Commonly known as: Nitrostat; Place 1   tablet under the tongue every 5 minutes as needed for Chest pain up to max   of 3 total doses. If no relief after 1 dose, call 911. ticagrelor 90 MG Tabs tablet; Commonly known as: BRILINTA; Take 1 tablet   by mouth 2 times daily     CHANGE how you take these medications     losartan 25 MG tablet; Commonly known as: COZAAR; Take 1 tablet by mouth   daily; Start taking on: October 25, 2022; What changed: medication   strength, how much to take     CONTINUE taking these medications     rivaroxaban 20 MG Tabs tablet; Commonly known as: Xarelto; Take 1 tablet   by mouth daily (with breakfast)     STOP taking these medications     acetaminophen 500 MG tablet; Commonly known as: TYLENOL   benzocaine 10 % mucosal gel; Commonly known as: ORAJEL   ibuprofen 800 MG tablet; Commonly known as: IBU   potassium chloride 20 MEQ extended release tablet; Commonly known as:   KLOR-CON M          Coronary Discharge Core Measure: Please indicate the medication given by X, and if not the reasons not given:    Not Given Reason  Given      Beta Blockers x      ACE-I cozaar      Statins x      ASA For one week then stop        OAP (Plavix/Effient/Brilinta) Brilinta ( meds to beds     SL Nitro   x           Discussed with patient and nursing. Medications and discharge instructions reviewed with patient and nursing. Discussed in detail with patient post cath POC including but not limited to medications, diet, exercise,  artery site care, and follow-up.  Questions and concerns addressed. OK for discharge home today. F/U in office in 1-3 weeks.      Electronically signed by KELSEA Sinha NP on 10/24/2022 at [address] [place] [address] [facility]      [phone]

## 2023-03-20 ENCOUNTER — HOSPITAL ENCOUNTER (EMERGENCY)
Age: 43
Discharge: LEFT BEFORE DISCHARGE | End: 2023-03-20
Attending: EMERGENCY MEDICINE

## 2023-03-20 DIAGNOSIS — Z53.20 PATIENT REFUSED EVALUATION OR TREATMENT: Primary | ICD-10-CM

## 2023-03-20 PROCEDURE — 4500000002 HC ER NO CHARGE

## 2023-03-20 NOTE — PROGRESS NOTES
Patient initially checked in with chief complaint \"I am sick, I think something is wrong with my liver\". Upon entering examination room, patient decided she did not want to be evaluated further. Appears to have decisional making capacity. Is also here with a family member.     Electronically signed by Mariano Crow MD on 3/20/2023 at 11:12 AM

## 2023-03-20 NOTE — ED PROVIDER NOTES
Patient did not want to be seen, I did not see this patient.      Starr Rivers MD  03/20/23 4261 present

## 2023-03-22 NOTE — ED PROVIDER NOTES
Patient initially checked in with chief complaint \"I am sick, I think something is wrong with my liver\". Upon entering examination room, patient decided she did not want to be evaluated further. Appears to have decisional making capacity. Is also here with a family member.      Electronically signed by Marcia Rogers MD on 3/20/2023 at 11:12 AM      Sue Martinez MD  Resident  03/22/23 5172

## 2023-10-06 ENCOUNTER — HOSPITAL ENCOUNTER (OUTPATIENT)
Age: 43
Setting detail: SPECIMEN
Discharge: HOME | End: 2023-10-06

## 2023-10-07 LAB
25(OH)D3 SERPL-MCNC: 12 NG/ML (ref 30–100)
ALBUMIN SERPL-MCNC: 4.5 G/DL (ref 3.5–5.2)
ALBUMIN/GLOB SERPL: 2 {RATIO} (ref 1–2.5)
ALP SERPL-CCNC: 77 U/L (ref 35–104)
ALT SERPL-CCNC: 19 U/L (ref 10–35)
ANION GAP SERPL CALCULATED.3IONS-SCNC: 14 MMOL/L (ref 9–16)
AST SERPL-CCNC: 26 U/L (ref 10–35)
BASOPHILS # BLD: 0.07 K/UL (ref 0–0.2)
BASOPHILS NFR BLD: 1 % (ref 0–2)
BILIRUB SERPL-MCNC: 0.3 MG/DL (ref 0–1.2)
BUN SERPL-MCNC: 9 MG/DL (ref 6–20)
CALCIUM SERPL-MCNC: 9.4 MG/DL (ref 8.6–10.4)
CHLORIDE SERPL-SCNC: 105 MMOL/L (ref 98–107)
CHOLEST SERPL-MCNC: 213 MG/DL (ref 0–199)
CHOLESTEROL/HDL RATIO: 3
CO2 SERPL-SCNC: 22 MMOL/L (ref 20–31)
CREAT SERPL-MCNC: 0.6 MG/DL (ref 0.5–0.9)
EOSINOPHIL # BLD: 0.24 K/UL (ref 0–0.44)
EOSINOPHILS RELATIVE PERCENT: 3 % (ref 1–4)
ERYTHROCYTE [DISTWIDTH] IN BLOOD BY AUTOMATED COUNT: 16.9 % (ref 11.8–14.4)
GFR SERPL CREATININE-BSD FRML MDRD: >60 ML/MIN/1.73M2
GLUCOSE SERPL-MCNC: 80 MG/DL (ref 74–99)
HCT VFR BLD AUTO: 40.3 % (ref 36.3–47.1)
HDLC SERPL-MCNC: 84 MG/DL
HGB BLD-MCNC: 14.3 G/DL (ref 11.9–15.1)
IMM GRANULOCYTES # BLD AUTO: <0.03 K/UL (ref 0–0.3)
IMM GRANULOCYTES NFR BLD: 0 %
LDLC SERPL CALC-MCNC: 110 MG/DL (ref 0–100)
LYMPHOCYTES NFR BLD: 3.91 K/UL (ref 1.1–3.7)
LYMPHOCYTES RELATIVE PERCENT: 44 % (ref 24–43)
MCH RBC QN AUTO: 33.4 PG (ref 25.2–33.5)
MCHC RBC AUTO-ENTMCNC: 35.5 G/DL (ref 28.4–34.8)
MCV RBC AUTO: 94.2 FL (ref 82.6–102.9)
MONOCYTES NFR BLD: 0.29 K/UL (ref 0.1–1.2)
MONOCYTES NFR BLD: 3 % (ref 3–12)
NEUTROPHILS NFR BLD: 49 % (ref 36–65)
NEUTS SEG NFR BLD: 4.36 K/UL (ref 1.5–8.1)
NRBC BLD-RTO: 0 PER 100 WBC
PLATELET # BLD AUTO: 316 K/UL (ref 138–453)
PMV BLD AUTO: 10.8 FL (ref 8.1–13.5)
POTASSIUM SERPL-SCNC: 3.5 MMOL/L (ref 3.7–5.3)
PROT SERPL-MCNC: 7.5 G/DL (ref 6.6–8.7)
RBC # BLD AUTO: 4.28 M/UL (ref 3.95–5.11)
RBC # BLD: ABNORMAL 10*6/UL
SODIUM SERPL-SCNC: 141 MMOL/L (ref 136–145)
TRIGL SERPL-MCNC: 97 MG/DL (ref 0–149)
TSH SERPL DL<=0.05 MIU/L-ACNC: 1.11 UIU/ML (ref 0.27–4.2)
VLDLC SERPL CALC-MCNC: 19 MG/DL
WBC OTHER # BLD: 8.9 K/UL (ref 3.5–11.3)

## 2024-12-08 ENCOUNTER — HOSPITAL ENCOUNTER (EMERGENCY)
Age: 44
Discharge: HOME | End: 2024-12-08
Attending: EMERGENCY MEDICINE
Payer: MEDICAID

## 2024-12-08 ENCOUNTER — APPOINTMENT (OUTPATIENT)
Dept: GENERAL RADIOLOGY | Age: 44
End: 2024-12-08
Payer: MEDICAID

## 2024-12-08 VITALS
BODY MASS INDEX: 28.64 KG/M2 | RESPIRATION RATE: 20 BRPM | OXYGEN SATURATION: 97 % | HEIGHT: 61 IN | TEMPERATURE: 98.2 F | WEIGHT: 151.68 LBS | DIASTOLIC BLOOD PRESSURE: 120 MMHG | HEART RATE: 108 BPM | SYSTOLIC BLOOD PRESSURE: 154 MMHG

## 2024-12-08 DIAGNOSIS — M25.522 ELBOW PAIN, LEFT: Primary | ICD-10-CM

## 2024-12-08 DIAGNOSIS — I10 ASYMPTOMATIC HYPERTENSION: ICD-10-CM

## 2024-12-08 PROCEDURE — 73060 X-RAY EXAM OF HUMERUS: CPT

## 2024-12-08 PROCEDURE — 6360000002 HC RX W HCPCS: Performed by: STUDENT IN AN ORGANIZED HEALTH CARE EDUCATION/TRAINING PROGRAM

## 2024-12-08 PROCEDURE — 96372 THER/PROPH/DIAG INJ SC/IM: CPT

## 2024-12-08 PROCEDURE — 99284 EMERGENCY DEPT VISIT MOD MDM: CPT

## 2024-12-08 PROCEDURE — 6370000000 HC RX 637 (ALT 250 FOR IP): Performed by: STUDENT IN AN ORGANIZED HEALTH CARE EDUCATION/TRAINING PROGRAM

## 2024-12-08 PROCEDURE — 73080 X-RAY EXAM OF ELBOW: CPT

## 2024-12-08 PROCEDURE — 73090 X-RAY EXAM OF FOREARM: CPT

## 2024-12-08 RX ORDER — ACETAMINOPHEN 325 MG/1
650 TABLET ORAL ONCE
Status: COMPLETED | OUTPATIENT
Start: 2024-12-08 | End: 2024-12-08

## 2024-12-08 RX ORDER — IBUPROFEN 600 MG/1
600 TABLET, FILM COATED ORAL EVERY 8 HOURS PRN
Qty: 21 TABLET | Refills: 0 | Status: SHIPPED | OUTPATIENT
Start: 2024-12-08 | End: 2024-12-15

## 2024-12-08 RX ORDER — KETOROLAC TROMETHAMINE 30 MG/ML
30 INJECTION, SOLUTION INTRAMUSCULAR; INTRAVENOUS ONCE
Status: COMPLETED | OUTPATIENT
Start: 2024-12-08 | End: 2024-12-08

## 2024-12-08 RX ADMIN — KETOROLAC TROMETHAMINE 30 MG: 30 INJECTION, SOLUTION INTRAMUSCULAR at 09:28

## 2024-12-08 RX ADMIN — ACETAMINOPHEN 650 MG: 325 TABLET ORAL at 09:29

## 2024-12-08 ASSESSMENT — PAIN DESCRIPTION - ORIENTATION: ORIENTATION: LEFT

## 2024-12-08 ASSESSMENT — PAIN DESCRIPTION - PAIN TYPE: TYPE: ACUTE PAIN

## 2024-12-08 ASSESSMENT — PAIN SCALES - GENERAL
PAINLEVEL_OUTOF10: 10
PAINLEVEL_OUTOF10: 10

## 2024-12-08 ASSESSMENT — PAIN DESCRIPTION - LOCATION: LOCATION: NECK;ARM

## 2024-12-08 ASSESSMENT — PAIN - FUNCTIONAL ASSESSMENT: PAIN_FUNCTIONAL_ASSESSMENT: 0-10

## 2024-12-08 ASSESSMENT — LIFESTYLE VARIABLES: HOW OFTEN DO YOU HAVE A DRINK CONTAINING ALCOHOL: NEVER

## 2024-12-08 NOTE — ED PROVIDER NOTES
[facility]     Emergency Department     Faculty Attestation    I performed a history and physical examination of the patient and discussed management with the resident. I reviewed the resident's note and agree with the documented findings and plan of care. Any areas of disagreement are noted on the chart. I was personally present for the key portions of any procedures. I have documented in the chart those procedures where I was not present during the key portions. I have reviewed the emergency nurses triage note. I agree with the chief complaint, past medical history, past surgical history, allergies, medications, social and family history as documented unless otherwise noted below. For Physician Assistant/ Nurse Practitioner cases/documentation I have personally evaluated this patient and have completed at least one if not all key elements of the E/M (history, physical exam, and MDM). Additional findings are as noted.    Right-hand-dominant, pain and swelling left elbow.     Jimi Mar MD  12/08/24 0914    
Sensation in bilateral upper and lower extremities grossly intact        DDX/DIAGNOSTIC RESULTS / EMERGENCY DEPARTMENT COURSE / MDM     Medical Decision Making  44-year-old with mechanical fall landing on elbow presenting with elbow pain, tenderness, decreased range of motion.  Fracture versus soft tissue injury.  Will obtain x-rays of left elbow, humerus, radius and ulna.  Will work on pain control.     Amount and/or Complexity of Data Reviewed  Radiology: ordered.    Risk  OTC drugs.  Prescription drug management.      EMERGENCY DEPARTMENT COURSE:    ED Course as of 12/08/24 1101   Sun Dec 08, 2024   1008 IMPRESSION:  Left humerus:     No acute osseous abnormality.     Left elbow:     1. No elbow joint effusion.  2. No acute fracture or dislocation.  Left forearm:     No acute osseous abnormality.      [OT]   1035 Went to check on her in room and she was asleep.  She woke up and informed her that there were no breaks or fractures found on her x-rays.  Will provide sling for comfort.  Will send home with mortin and an Ice pack.   [OT]   1058 Re-checked HR via radial pulse and slight decrease to 108. Pt states still anxious because of fall but relieved she doesn't have a broken bone. No lightheadedness, palpitations, chest pain.   Instructed patient to monitor for symptoms. Stressed importance of pcp follow-up given her past medical history.    [OT]      ED Course User Index  [OT] Nancy Avendano MD       CONSULTS:  None    CRITICAL CARE:  There was significant risk of life threatening deterioration of patient's condition requiring my direct management. Critical care time 0 minutes, excluding any documented procedures.    FINAL IMPRESSION      1. Elbow pain, left    2. Asymptomatic hypertension        DISPOSITION / PLAN     DISPOSITION Decision To Discharge 12/08/2024 10:38:19 AM   DISPOSITION CONDITION Stable       PATIENT REFERRED TO:  [facility] AT [facility]  [address] [place] [address]

## 2024-12-08 NOTE — ED NOTES
Patient presents to ED for evaluation of left neck pain radiating down her left arm after falling down some stairs immediately PTA. Patient did not hit her head. NO LOC. No other injuries.

## 2024-12-08 NOTE — DISCHARGE INSTRUCTIONS
You came to the emergency department today due to elbow pain after a mechanical fall.  We took x-rays of your elbow, upper arm, and arm and did not find any acute fractures or dislocations.  We gave you some medication to help your pain which led you to sleep.  For any give you a sling that you can wear for comfort.  I recommend taking your arm out of the sling several times a day moving her elbow to prevent stiff muscles or atrophy.  Also given you a prescription for ibuprofen to take up to 3 times a day for pain and inflammation.  This medication may cause stomach upset so recommend taking it with food.    Come back to the emergency room right away if you develop increasingly severe pain, swelling, you start noticing color changes in your arm, losing sensation in your hand or fingers.  Otherwise I recommend following up with a primary care doctor to let them know you are in the emergency room.  If you do not have a primary care doctor you can call [facility] and establish care.  I have included the number to orthopedic surgery.  If you are not feeling substantially better in 2 weeks call them for a follow-up visit.

## 2025-02-05 ENCOUNTER — APPOINTMENT (OUTPATIENT)
Dept: GENERAL RADIOLOGY | Age: 45
End: 2025-02-05
Payer: MEDICAID

## 2025-02-05 ENCOUNTER — HOSPITAL ENCOUNTER (EMERGENCY)
Age: 45
Discharge: HOME | End: 2025-02-05
Attending: EMERGENCY MEDICINE
Payer: MEDICAID

## 2025-02-05 VITALS
DIASTOLIC BLOOD PRESSURE: 109 MMHG | OXYGEN SATURATION: 100 % | SYSTOLIC BLOOD PRESSURE: 166 MMHG | HEART RATE: 111 BPM | BODY MASS INDEX: 29.29 KG/M2 | RESPIRATION RATE: 18 BRPM | WEIGHT: 155 LBS | TEMPERATURE: 98.4 F

## 2025-02-05 DIAGNOSIS — R79.89 ELEVATED D-DIMER: Primary | ICD-10-CM

## 2025-02-05 LAB
ANION GAP SERPL CALCULATED.3IONS-SCNC: 17 MMOL/L (ref 9–16)
BNP SERPL-MCNC: 115 PG/ML (ref 0–125)
BUN SERPL-MCNC: 14 MG/DL (ref 6–20)
CALCIUM SERPL-MCNC: 9.7 MG/DL (ref 8.6–10.4)
CHLORIDE SERPL-SCNC: 108 MMOL/L (ref 98–107)
CO2 SERPL-SCNC: 19 MMOL/L (ref 20–31)
CREAT SERPL-MCNC: 0.7 MG/DL (ref 0.6–0.9)
D DIMER PPP FEU-MCNC: 1.36 UG/ML FEU (ref 0–0.57)
GFR, ESTIMATED: >90 ML/MIN/1.73M2
GLUCOSE SERPL-MCNC: 95 MG/DL (ref 74–99)
INR PPP: 1
PARTIAL THROMBOPLASTIN TIME: 29 SEC (ref 23–36.5)
POTASSIUM SERPL-SCNC: 3.3 MMOL/L (ref 3.7–5.3)
PROTHROMBIN TIME: 12.6 SEC (ref 11.7–14.9)
SODIUM SERPL-SCNC: 144 MMOL/L (ref 136–145)
TROPONIN I SERPL HS-MCNC: <6 NG/L (ref 0–14)
TROPONIN I SERPL HS-MCNC: <6 NG/L (ref 0–14)

## 2025-02-05 PROCEDURE — 93005 ELECTROCARDIOGRAM TRACING: CPT | Performed by: EMERGENCY MEDICINE

## 2025-02-05 PROCEDURE — 73562 X-RAY EXAM OF KNEE 3: CPT

## 2025-02-05 PROCEDURE — 80048 BASIC METABOLIC PNL TOTAL CA: CPT

## 2025-02-05 PROCEDURE — 85379 FIBRIN DEGRADATION QUANT: CPT

## 2025-02-05 PROCEDURE — 96372 THER/PROPH/DIAG INJ SC/IM: CPT

## 2025-02-05 PROCEDURE — 6360000002 HC RX W HCPCS

## 2025-02-05 PROCEDURE — 84484 ASSAY OF TROPONIN QUANT: CPT

## 2025-02-05 PROCEDURE — 99285 EMERGENCY DEPT VISIT HI MDM: CPT

## 2025-02-05 PROCEDURE — 83880 ASSAY OF NATRIURETIC PEPTIDE: CPT

## 2025-02-05 PROCEDURE — 85610 PROTHROMBIN TIME: CPT

## 2025-02-05 PROCEDURE — 6370000000 HC RX 637 (ALT 250 FOR IP)

## 2025-02-05 PROCEDURE — 85730 THROMBOPLASTIN TIME PARTIAL: CPT

## 2025-02-05 PROCEDURE — 96374 THER/PROPH/DIAG INJ IV PUSH: CPT

## 2025-02-05 PROCEDURE — 71046 X-RAY EXAM CHEST 2 VIEWS: CPT

## 2025-02-05 RX ORDER — CYCLOBENZAPRINE HCL 10 MG
10 TABLET ORAL ONCE
Status: COMPLETED | OUTPATIENT
Start: 2025-02-05 | End: 2025-02-05

## 2025-02-05 RX ORDER — CYCLOBENZAPRINE HCL 10 MG
10 TABLET ORAL 3 TIMES DAILY PRN
Qty: 21 TABLET | Refills: 0 | Status: SHIPPED | OUTPATIENT
Start: 2025-02-05 | End: 2025-02-15

## 2025-02-05 RX ORDER — KETOROLAC TROMETHAMINE 30 MG/ML
30 INJECTION, SOLUTION INTRAMUSCULAR; INTRAVENOUS ONCE
Status: COMPLETED | OUTPATIENT
Start: 2025-02-05 | End: 2025-02-05

## 2025-02-05 RX ORDER — ENOXAPARIN SODIUM 100 MG/ML
1 INJECTION SUBCUTANEOUS ONCE
Status: COMPLETED | OUTPATIENT
Start: 2025-02-05 | End: 2025-02-05

## 2025-02-05 RX ADMIN — POTASSIUM BICARBONATE 40 MEQ: 782 TABLET, EFFERVESCENT ORAL at 21:07

## 2025-02-05 RX ADMIN — CYCLOBENZAPRINE 10 MG: 10 TABLET, FILM COATED ORAL at 21:07

## 2025-02-05 RX ADMIN — ENOXAPARIN SODIUM 70 MG: 100 INJECTION SUBCUTANEOUS at 21:07

## 2025-02-05 RX ADMIN — KETOROLAC TROMETHAMINE 30 MG: 30 INJECTION, SOLUTION INTRAMUSCULAR; INTRAVENOUS at 18:27

## 2025-02-05 ASSESSMENT — ENCOUNTER SYMPTOMS
SHORTNESS OF BREATH: 0
NAUSEA: 0
ABDOMINAL PAIN: 0
VOMITING: 0

## 2025-02-05 ASSESSMENT — PAIN DESCRIPTION - LOCATION: LOCATION: LEG;KNEE

## 2025-02-05 ASSESSMENT — PAIN SCALES - GENERAL: PAINLEVEL_OUTOF10: 8

## 2025-02-05 ASSESSMENT — LIFESTYLE VARIABLES
HOW OFTEN DO YOU HAVE A DRINK CONTAINING ALCOHOL: NEVER
HOW MANY STANDARD DRINKS CONTAINING ALCOHOL DO YOU HAVE ON A TYPICAL DAY: PATIENT DOES NOT DRINK

## 2025-02-05 ASSESSMENT — PAIN DESCRIPTION - ORIENTATION: ORIENTATION: LEFT

## 2025-02-05 NOTE — ED TRIAGE NOTES
Pt presented to ED 8 ambulatory from triage.  Pt presents with C/O left knee/leg pain .  Pt states she has been noticing pain in her knee for a few days now. Pt states yesterday it was so bad and felt warm to touch. Pt states she took an extra dose of her blood thinner to see if it helps at all. Pt states the pain is shooting. Pt states there is some numbness that goes down her leg. Pt states she has a history with blood clots.   Pt denies CP, SOB, N/V/D,.  Pt is Alert and oriented. Pt is resting comfortably on stretcher with call light in reach.  No acute distress noted. Respirations are even and unlabored.  White board updated. Will continue to follow plan of care.

## 2025-02-05 NOTE — ED NOTES
Labeled blood specimens sent to lab via tube system.    [x] Green/yellow  [x] Lavender   [] on ice   [x] Blue   [x] Green/black [] on ice  [x] Yellow  [] on ice  [] Red  [] Pink  [] Blood Cultures  [] x1 [] X2    [] Ped Green  [] on ice  [] Ped Lavender  [] on ice    [] Ped Yellow  [] on ice  [] Ped Red

## 2025-02-05 NOTE — ED NOTES
Pt ambulatory to the restroom with writer and son. Pt states pain was worsening with walking. Wheelchair provided for remaining distance.

## 2025-02-05 NOTE — ED PROVIDER NOTES
[facility]  Emergency Department  Faculty Attestation     I performed a history and physical examination of the patient and discussed management with the resident. I reviewed the resident’s note and agree with the documented findings and plan of care. Any areas of disagreement are noted on the chart. I was personally present for the key portions of any procedures. I have documented in the chart those procedures where I was not present during the key portions. I have reviewed the emergency nurses triage note. I agree with the chief complaint, past medical history, past surgical history, allergies, medications, social and family history as documented unless otherwise noted below.    For Physician Assistant/ Nurse Practitioner cases/documentation I have personally evaluated this patient and have completed at least one if not all key elements of the E/M (history, physical exam, and MDM). Additional findings are as noted.    Preliminary note started at 6:23 PM EST    Primary Care Physician:  No primary care provider on file.    Screenings:  [unfilled]    CHIEF COMPLAINT       Chief Complaint   Patient presents with    Knee Pain     Left ; Hx of blood clot       RECENT VITALS:   BP (!) 166/109   Pulse (!) 111   Temp 98.4 °F (36.9 °C)   Resp 18   SpO2 100%     LABS:  Labs Reviewed   D-DIMER, QUANTITATIVE   PROTIME-INR   APTT       Radiology  XR KNEE LEFT (3 VIEWS)    (Results Pending)       CRITICAL CARE: There was a high probability of clinically significant/life threatening deterioration in this patient's condition which required my urgent intervention.  Total critical care time was none minutes.  This excludes any time for separately reportable procedures.     EKG:    EKG Interpretation    Interpreted by me    Rhythm: normal sinus   Rate: normal  Axis: normal  Ectopy: none  Conduction: normal  ST Segments: Diffuse ST elevation inferolaterally, probable [place] depression, J wave

## 2025-02-06 LAB
EKG ATRIAL RATE: 99 BPM
EKG P AXIS: 68 DEGREES
EKG P-R INTERVAL: 200 MS
EKG Q-T INTERVAL: 354 MS
EKG QRS DURATION: 80 MS
EKG QTC CALCULATION (BAZETT): 454 MS
EKG R AXIS: 69 DEGREES
EKG T AXIS: 50 DEGREES
EKG VENTRICULAR RATE: 99 BPM

## 2025-02-06 PROCEDURE — 93010 ELECTROCARDIOGRAM REPORT: CPT | Performed by: INTERNAL MEDICINE

## 2025-02-06 NOTE — ED PROVIDER NOTES
[facility] EMERGENCY DEPARTMENT  Emergency Department Encounter  Emergency Medicine Resident     Pt Name:Natalia Jimenez  MRN: [id number]  Birthdate 1980  Date of evaluation: 25  PCP:  No primary care provider on file.  Note Started: 9:38 PM EST      CHIEF COMPLAINT       Chief Complaint   Patient presents with    Knee Pain     Left ; Hx of blood clot       HISTORY OF PRESENT ILLNESS  (Location/Symptom, Timing/Onset, Context/Setting, Quality, Duration, Modifying Factors, Severity.)      Natalia Jimenez is a 44 y.o. female who presents with left knee pain.  Patient reports that she has a history of blood clots and is currently on Xarelto.  She states that she developed pain along the anterior and lateral portion of the left knee that has been ongoing for the last few days.  Patient states that she fell asleep with her legs crossed with her daughter and when she woke up she had this pain.  She states that she is concerned she may have a blood clot, which is what prompted her to present to the emergency department.  She denies any trauma or inciting incident or injury.  She denies chest pain or shortness of breath on original examination.  Patient denies abdominal pain, nausea, vomiting.  She took Tylenol at home without any significant relief of her pain.  No recent cold-like symptoms.    PAST MEDICAL / SURGICAL / SOCIAL / FAMILY HISTORY      has a past medical history of DVT (deep vein thrombosis) in pregnancy, H/O blood clots, and Other emphysema (HCC).       has a past surgical history that includes  section (, ) and Tubal ligation ().      Social History     Socioeconomic History    Marital status: Single     Spouse name: Not on file    Number of children: Not on file    Years of education: Not on file    Highest education level: Not on file   Occupational History    Not on file   Tobacco Use    Smoking status: Every Day     Current packs/day: 0.20     Types: Cigarettes

## 2025-02-06 NOTE — ED NOTES
Report given to YAJAIRA Grewal.  All questions answered.    <-- Click to add NO significant Past Surgical History

## 2025-02-06 NOTE — DISCHARGE INSTRUCTIONS
Call [phone] to schedule first thing in the morning to schedule the ultrasound of your left lower extremity to look for a blood clot.  If you are unable to schedule it for tomorrow, please return to the emergency department between the hours of 8am and 3pm to have it completed here.  IF THE ULTRASOUND IS POSITIVE FOR A BLOOD CLOT: You will have to be switched to a new blood thinner.    Thank you for visiting [facility] Emergency Department.    You need to call a PCP to make an appointment as directed for follow up.    Should you have any questions regarding your care or further treatment, please call [facility] Emergency Department at [phone].

## 2025-04-21 ENCOUNTER — HOSPITAL ENCOUNTER (EMERGENCY)
Age: 45
Discharge: HOME | End: 2025-04-21

## 2025-04-21 ENCOUNTER — APPOINTMENT (OUTPATIENT)
Dept: GENERAL RADIOLOGY | Age: 45
End: 2025-04-21

## 2025-04-21 VITALS
HEART RATE: 103 BPM | HEIGHT: 61 IN | RESPIRATION RATE: 14 BRPM | OXYGEN SATURATION: 99 % | BODY MASS INDEX: 29.27 KG/M2 | DIASTOLIC BLOOD PRESSURE: 81 MMHG | TEMPERATURE: 97 F | WEIGHT: 155 LBS | SYSTOLIC BLOOD PRESSURE: 136 MMHG

## 2025-04-21 ASSESSMENT — PAIN - FUNCTIONAL ASSESSMENT: PAIN_FUNCTIONAL_ASSESSMENT: 0-10

## 2025-04-21 ASSESSMENT — PAIN SCALES - GENERAL: PAINLEVEL_OUTOF10: 9

## 2025-04-22 ENCOUNTER — HOSPITAL ENCOUNTER (EMERGENCY)
Age: 45
Discharge: HOME | End: 2025-04-22
Attending: EMERGENCY MEDICINE
Payer: MEDICAID

## 2025-04-22 VITALS
DIASTOLIC BLOOD PRESSURE: 60 MMHG | TEMPERATURE: 98.1 F | OXYGEN SATURATION: 100 % | HEART RATE: 107 BPM | RESPIRATION RATE: 16 BRPM | SYSTOLIC BLOOD PRESSURE: 141 MMHG

## 2025-04-22 DIAGNOSIS — Z86.718 HISTORY OF DVT OF LOWER EXTREMITY: ICD-10-CM

## 2025-04-22 DIAGNOSIS — M79.605 PAIN AND SWELLING OF LEFT LOWER EXTREMITY: Primary | ICD-10-CM

## 2025-04-22 DIAGNOSIS — M79.89 PAIN AND SWELLING OF LEFT LOWER EXTREMITY: Primary | ICD-10-CM

## 2025-04-22 PROCEDURE — 6360000002 HC RX W HCPCS: Performed by: STUDENT IN AN ORGANIZED HEALTH CARE EDUCATION/TRAINING PROGRAM

## 2025-04-22 PROCEDURE — 99284 EMERGENCY DEPT VISIT MOD MDM: CPT

## 2025-04-22 PROCEDURE — 96372 THER/PROPH/DIAG INJ SC/IM: CPT

## 2025-04-22 RX ORDER — ENOXAPARIN SODIUM 100 MG/ML
1 INJECTION SUBCUTANEOUS ONCE
Status: COMPLETED | OUTPATIENT
Start: 2025-04-22 | End: 2025-04-22

## 2025-04-22 RX ORDER — KETOROLAC TROMETHAMINE 30 MG/ML
30 INJECTION, SOLUTION INTRAMUSCULAR; INTRAVENOUS ONCE
Status: COMPLETED | OUTPATIENT
Start: 2025-04-22 | End: 2025-04-22

## 2025-04-22 RX ADMIN — KETOROLAC TROMETHAMINE 30 MG: 30 INJECTION, SOLUTION INTRAMUSCULAR at 01:17

## 2025-04-22 RX ADMIN — ENOXAPARIN SODIUM 70 MG: 100 INJECTION SUBCUTANEOUS at 01:20

## 2025-04-22 ASSESSMENT — ENCOUNTER SYMPTOMS
VOMITING: 0
NAUSEA: 0
SHORTNESS OF BREATH: 0
WHEEZING: 0
COUGH: 1
ABDOMINAL PAIN: 0

## 2025-04-22 ASSESSMENT — PAIN DESCRIPTION - LOCATION: LOCATION: LEG

## 2025-04-22 ASSESSMENT — PAIN - FUNCTIONAL ASSESSMENT: PAIN_FUNCTIONAL_ASSESSMENT: 0-10

## 2025-04-22 ASSESSMENT — PAIN SCALES - GENERAL: PAINLEVEL_OUTOF10: 10

## 2025-04-22 ASSESSMENT — PAIN DESCRIPTION - ORIENTATION: ORIENTATION: LEFT

## 2025-04-22 NOTE — ED PROVIDER NOTES
[facility] EMERGENCY DEPARTMENT  Emergency Department Encounter  Emergency Medicine Resident     Pt Name:Natalia Jimenez  MRN: [id number]  Birthdate 1980  Date of evaluation: 25  PCP:  No primary care provider on file.  Note Started: 12:40 AM EDT      CHIEF COMPLAINT       Chief Complaint   Patient presents with    Leg Pain    Leg Swelling       HISTORY OF PRESENT ILLNESS  (Location/Symptom, Timing/Onset, Context/Setting, Quality, Duration, Modifying Factors, Severity.)      Natalia Jimenez is a 44 y.o. female who presents with left lower extremity pain and swelling.  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  Went to [facility] ED yesterday but did not stay for evaluation.  States pain and swelling has been present for the last few days.  Reports cough over the last few days, nonproductive, mild, intermittent.  Denies hemoptysis, shortness of breath, chest pain.  Denies fever, chills, nausea, vomiting, abdominal pain.  Denies trauma or inciting event.  When asked what has been obstructing her ability to follow-up in clinic patient states \"life\".  States he is currently not on any medications because of not following up.    PAST MEDICAL / SURGICAL / SOCIAL / FAMILY HISTORY      has a past medical history of DVT (deep vein thrombosis) in pregnancy, H/O blood clots, and Other emphysema (HCC).       has a past surgical history that includes  section (, ) and Tubal ligation ().      Social History     Socioeconomic History    Marital status: Single     Spouse name: Not on file    Number of children: Not on file    Years of education: Not on file    Highest education level: Not on file   Occupational History    Not on file   Tobacco Use    Smoking status: Every Day     Current packs/day: 0.20     Types: Cigarettes    Smokeless tobacco: 
for Doppler studies      Critical Care  None          Leno Poole MD    Attending Emergency Medicine Physician            Levon Poole MD  04/22/25 0113

## 2025-04-22 NOTE — DISCHARGE INSTRUCTIONS
Return to emergency department between the hours of 8 AM-3 PM to obtain a vascular ultrasound to evaluate for possible blood clot in your leg.  An outpatient order for an ultrasound is also been placed in case you are unable to follow-up in the emergency department.  You were given a dose of blood thinner in the emergency department as well as a prescription for Xarelto which you should start taking tomorrow if you are unable to follow-up for the ultrasound tomorrow.  Return to emergency department immediately for any signs of severely worsening pain or swelling, shortness of breath, fever, or any other acute concerns.

## 2025-04-22 NOTE — ED NOTES
Pt presents to the ER via triage in wheelchair. Pt able to ambulate to bed. Pt c/o left lower leg pain & swelling for past 2 days. Pt denies any injuries. Pt states hx of blood clots. Pt states she has not had her blood thinners for 6 months. Pt states she isn't taking any medications right now. Pt otherwise A&O x4, in NAD, VSS.

## 2025-06-15 ENCOUNTER — HOSPITAL ENCOUNTER (EMERGENCY)
Age: 45
Discharge: HOME | End: 2025-06-15
Attending: EMERGENCY MEDICINE
Payer: MEDICAID

## 2025-06-15 ENCOUNTER — APPOINTMENT (OUTPATIENT)
Dept: CT IMAGING | Age: 45
End: 2025-06-15
Payer: MEDICAID

## 2025-06-15 ENCOUNTER — HOSPITAL ENCOUNTER (EMERGENCY)
Age: 45
Discharge: HOME | End: 2025-06-15
Attending: EMERGENCY MEDICINE

## 2025-06-15 VITALS
WEIGHT: 157 LBS | OXYGEN SATURATION: 99 % | RESPIRATION RATE: 19 BRPM | BODY MASS INDEX: 29.66 KG/M2 | TEMPERATURE: 98.4 F | HEART RATE: 98 BPM | SYSTOLIC BLOOD PRESSURE: 148 MMHG | DIASTOLIC BLOOD PRESSURE: 86 MMHG

## 2025-06-15 VITALS
HEART RATE: 105 BPM | SYSTOLIC BLOOD PRESSURE: 142 MMHG | DIASTOLIC BLOOD PRESSURE: 93 MMHG | OXYGEN SATURATION: 95 % | RESPIRATION RATE: 18 BRPM | TEMPERATURE: 98.4 F

## 2025-06-15 DIAGNOSIS — H53.9 CHANGE IN VISION: Primary | ICD-10-CM

## 2025-06-15 LAB
ALBUMIN SERPL-MCNC: 4.8 G/DL (ref 3.5–5.2)
ALBUMIN/GLOB SERPL: 1.4 {RATIO} (ref 1–2.5)
ALP SERPL-CCNC: 91 U/L (ref 35–104)
ALT SERPL-CCNC: 126 U/L (ref 10–35)
ANION GAP SERPL CALCULATED.3IONS-SCNC: 19 MMOL/L (ref 9–16)
AST SERPL-CCNC: 148 U/L (ref 10–35)
BASOPHILS # BLD: 0.04 K/UL (ref 0–0.2)
BASOPHILS NFR BLD: 1 % (ref 0–2)
BILIRUB SERPL-MCNC: 0.4 MG/DL (ref 0–1.2)
BUN SERPL-MCNC: 9 MG/DL (ref 6–20)
CALCIUM SERPL-MCNC: 9.4 MG/DL (ref 8.6–10.4)
CHLORIDE SERPL-SCNC: 98 MMOL/L (ref 98–107)
CO2 SERPL-SCNC: 21 MMOL/L (ref 20–31)
CREAT SERPL-MCNC: 0.6 MG/DL (ref 0.6–0.9)
EOSINOPHIL # BLD: 0.13 K/UL (ref 0–0.44)
EOSINOPHILS RELATIVE PERCENT: 2 % (ref 1–4)
ERYTHROCYTE [DISTWIDTH] IN BLOOD BY AUTOMATED COUNT: 15.3 % (ref 11.8–14.4)
GFR, ESTIMATED: >90 ML/MIN/1.73M2
GLUCOSE SERPL-MCNC: 83 MG/DL (ref 74–99)
HCT VFR BLD AUTO: 43.5 % (ref 36.3–47.1)
HGB BLD-MCNC: 14.9 G/DL (ref 11.9–15.1)
IMM GRANULOCYTES # BLD AUTO: <0.03 K/UL (ref 0–0.3)
IMM GRANULOCYTES NFR BLD: 0 %
LYMPHOCYTES NFR BLD: 3.57 K/UL (ref 1.1–3.7)
LYMPHOCYTES RELATIVE PERCENT: 45 % (ref 24–43)
MCH RBC QN AUTO: 33.6 PG (ref 25.2–33.5)
MCHC RBC AUTO-ENTMCNC: 37.7 G/DL (ref 28.4–34.8)
MCV RBC AUTO: 87.4 FL (ref 82.6–102.9)
MONOCYTES NFR BLD: 0.38 K/UL (ref 0.1–1.2)
MONOCYTES NFR BLD: 5 % (ref 3–12)
NEUTROPHILS NFR BLD: 48 % (ref 36–65)
NEUTS SEG NFR BLD: 3.87 K/UL (ref 1.5–8.1)
NRBC BLD-RTO: 0.5 PER 100 WBC
PLATELET # BLD AUTO: 135 K/UL (ref 138–453)
PMV BLD AUTO: 11.9 FL (ref 8.1–13.5)
POTASSIUM SERPL-SCNC: 3.4 MMOL/L (ref 3.7–5.3)
PROT SERPL-MCNC: 8.3 G/DL (ref 6.6–8.7)
RBC # BLD AUTO: 4.43 M/UL (ref 3.95–5.11)
RBC # BLD: ABNORMAL 10*6/UL
SODIUM SERPL-SCNC: 138 MMOL/L (ref 136–145)
TROPONIN I SERPL HS-MCNC: <6 NG/L (ref 0–14)
WBC OTHER # BLD: 8 K/UL (ref 3.5–11.3)

## 2025-06-15 PROCEDURE — 85025 COMPLETE CBC W/AUTO DIFF WBC: CPT

## 2025-06-15 PROCEDURE — 80053 COMPREHEN METABOLIC PANEL: CPT

## 2025-06-15 PROCEDURE — 84484 ASSAY OF TROPONIN QUANT: CPT

## 2025-06-15 PROCEDURE — 99284 EMERGENCY DEPT VISIT MOD MDM: CPT

## 2025-06-15 PROCEDURE — 93005 ELECTROCARDIOGRAM TRACING: CPT

## 2025-06-15 RX ORDER — IOPAMIDOL 755 MG/ML
90 INJECTION, SOLUTION INTRAVASCULAR
Status: DISCONTINUED | OUTPATIENT
Start: 2025-06-15 | End: 2025-06-15 | Stop reason: HOSPADM

## 2025-06-15 ASSESSMENT — PAIN - FUNCTIONAL ASSESSMENT
PAIN_FUNCTIONAL_ASSESSMENT: NONE - DENIES PAIN
PAIN_FUNCTIONAL_ASSESSMENT: NONE - DENIES PAIN

## 2025-06-15 ASSESSMENT — VISUAL ACUITY: OS: 20/30

## 2025-06-15 NOTE — ED NOTES
Patient was transported to CT for imaging. Patient's IV was not in. Patient refused for CT techs to attempt new IV. Patient transported back to her ED room. Patient refusing for anyone to attempt new IV. Patient requesting \"discharge papers and a work note.\" Resident notified.

## 2025-06-15 NOTE — ED NOTES
Patient is 44/F came in with significant other from triage complaining of left eye shadow/floater x4 days. Patient states it doesn't hurt but it bothers her knowing her history of stoke and having a stent placed. Patient denies any other symptoms. ER resident scored NIH of 0 during initial assessment. Patient admit she haven't seen her provider in a long time by choice due to personal stuff and her work. On full cardiac monitor. EKG done. IV established. Call light in reach, Plan of care on going.

## 2025-06-15 NOTE — DISCHARGE INSTRUCTIONS
You have presented to the emergency department for vision changes.  You are also having shortness of breath when you exert yourself this can can be concerning for a stroke and or a heart attack.  Your initial lab work was normal besides your AST and ALT were elevated which are liver enzymes.    He stated that you are going to [place] please go directly there.    If you were to change your mind please return to the emergency department    You should return immediately to the Emergency Department for any loss of consciousness, double vision, arm / leg numbness or weakness, or any other care or concern.

## 2025-06-15 NOTE — ED NOTES
Dr. Valdovinos at bedside to speak with the patient. Dr. Valdovinos performed slit lamp exam and ultrasound of patient's eyes at this time. Patient agreeable to ultrasound IV by the PICC team. Consult placed.

## 2025-06-15 NOTE — ED PROVIDER NOTES
Faculty Sign-Out Attestation  Handoff taken on the following patient from prior Attending Physician: Rehan  Note Started: 7:24 AM EDT    I was available and discussed any additional care issues that arose and coordinated the management plans with the resident(s) caring for the patient during my duty period. Any areas of disagreement with resident’s documentation of care or procedures are noted on the chart. I was personally present for the key portions of any/all procedures during my duty period. I have documented in the chart those procedures where I was not present during the key portions.    44-year-old female presents emergency department with floaters in vision, shadows on the left side.  Neurologic exam unremarkable.  Pending CT head and CT angiography of the head and neck, ocular ultrasound, reevaluation, and disposition.    Thong Gentile DO  Attending Physician        Thong Gentile DO  06/15/25 6073

## 2025-06-15 NOTE — ED NOTES
Report given to Pili GATES. All questions answered.  No change in patient status  Continues to rest quietly

## 2025-06-15 NOTE — ED PROVIDER NOTES
[facility] EMERGENCY DEPARTMENT  Emergency Department Encounter  Emergency Medicine Resident     Pt Name:Natalia Jimenez  MRN: [id number]  Birthdate 1980  Date of evaluation: 6/15/25  PCP:  No primary care provider on file.  Note Started: 5:57 AM EDT      CHIEF COMPLAINT       Chief Complaint   Patient presents with    Eye Problem     Seeing shadow left side of the eye x4 days       HISTORY OF PRESENT ILLNESS  (Location/Symptom, Timing/Onset, Context/Setting, Quality, Duration, Modifying Factors, Severity.)      Natalia Jimenez is a 44 y.o. female who presents with seeing spots.  Patient states that for the past 4 days she has been seeing floaters in her left eye.  Denies any weakness numbness tingling slurred speech facial droop.  Denies double vision.  States that she does have a prior history of an MI has a stent and supposed be on Brilinta but was previously changed to another medication.  States he does have a prior history of DVTs and PEs.  Initially stated that she was not on any blood thinners.  Patient denies any chest pain or shortness of breath nausea vomiting radiating pain at this time however she states when she does exert herself she does get short of breath has not recently been seen by her doctors    PAST MEDICAL / SURGICAL / SOCIAL / FAMILY HISTORY      has a past medical history of DVT (deep vein thrombosis) in pregnancy, H/O blood clots, and Other emphysema (HCC).       has a past surgical history that includes  section (, ) and Tubal ligation ().      Social History     Socioeconomic History    Marital status: Single     Spouse name: Not on file    Number of children: Not on file    Years of education: Not on file    Highest education level: Not on file   Occupational History    Not on file   Tobacco Use    Smoking status: Every Day     Current packs/day: 0.20     Types: Cigarettes    Smokeless tobacco: Never   Substance and Sexual Activity    Alcohol use: Yes

## 2025-06-15 NOTE — ED NOTES
Patient called out stating \"I want my discharge papers, I am going to work.\" Patient is refusing to wait for PICC consult at 0830 when they arrive. Resident notified.

## 2025-06-17 LAB
EKG ATRIAL RATE: 95 BPM
EKG P AXIS: 72 DEGREES
EKG P-R INTERVAL: 180 MS
EKG Q-T INTERVAL: 388 MS
EKG QRS DURATION: 86 MS
EKG QTC CALCULATION (BAZETT): 487 MS
EKG R AXIS: 73 DEGREES
EKG T AXIS: 56 DEGREES
EKG VENTRICULAR RATE: 95 BPM

## 2025-06-19 NOTE — ED PROVIDER NOTES
[facility]   Emergency Department  Faculty Attestation       I performed a history and physical examination of the patient and discussed management with the resident. I reviewed the resident’s note and agree with the documented findings including all diagnostic interpretations and plan of care. Any areas of disagreement are noted on the chart. I was personally present for the key portions of any procedures. I have documented in the chart those procedures where I was not present during the key portions. I have reviewed the emergency nurses triage note. I agree with the chief complaint, past medical history, past surgical history, allergies, medications, social and family history as documented unless otherwise noted below.  For Physician Assistant/ Nurse Practitioner cases/documentation I have personally evaluated this patient and have completed at least one if not all key elements of the E/M (history, physical exam, and MDM). Additional findings are as noted.    Patient Name: Natalia Jimenez  MRN: [id number]  : 1980  Primary Care Physician: No primary care provider on file.    Date of evaluationa: 6/15/2025   Note Started: 1:04 AM EDT    Pertinent Comments     Chief Complaint:   Chief Complaint   Patient presents with    Eye Problem     Seeing shadow left side of the eye x4 days        Initial vitals: (If not listed, please see nursing documentation)  ED Triage Vitals   BP Systolic BP Percentile Diastolic BP Percentile Temp Temp Source Pulse Respirations SpO2   06/15/25 0604 -- -- 06/15/25 0608 06/15/25 0608 06/15/25 0604 06/15/25 0604 06/15/25 0604   (!) 146/98   98.4 °F (36.9 °C) Oral 89 20 99 %      Height Weight - Scale         -- 06/15/25 0608          71.2 kg (157 lb)              HPI/PE/Impression:  This is a 44 y.o. female who presents to the Emergency Department with spots in her vision for approximately 4 days.  She is been having floaters in her left eyes.  Stated that initially

## 2025-07-11 ENCOUNTER — HOSPITAL ENCOUNTER (EMERGENCY)
Age: 45
Discharge: HOME | End: 2025-07-11
Attending: EMERGENCY MEDICINE
Payer: MEDICAID

## 2025-07-11 ENCOUNTER — APPOINTMENT (OUTPATIENT)
Dept: VASCULAR LAB | Age: 45
End: 2025-07-11
Payer: MEDICAID

## 2025-07-11 VITALS
HEART RATE: 96 BPM | OXYGEN SATURATION: 100 % | TEMPERATURE: 98.4 F | SYSTOLIC BLOOD PRESSURE: 148 MMHG | DIASTOLIC BLOOD PRESSURE: 114 MMHG | RESPIRATION RATE: 16 BRPM

## 2025-07-11 DIAGNOSIS — I80.02 THROMBOPHLEBITIS OF SUPERFICIAL VEINS OF LEFT LOWER EXTREMITY: ICD-10-CM

## 2025-07-11 DIAGNOSIS — M79.605 LEFT LEG PAIN: Primary | ICD-10-CM

## 2025-07-11 PROCEDURE — 93971 EXTREMITY STUDY: CPT

## 2025-07-11 PROCEDURE — 99284 EMERGENCY DEPT VISIT MOD MDM: CPT | Performed by: EMERGENCY MEDICINE

## 2025-07-11 PROCEDURE — 96372 THER/PROPH/DIAG INJ SC/IM: CPT | Performed by: EMERGENCY MEDICINE

## 2025-07-11 PROCEDURE — 93005 ELECTROCARDIOGRAM TRACING: CPT

## 2025-07-11 PROCEDURE — 6360000002 HC RX W HCPCS

## 2025-07-11 RX ORDER — KETOROLAC TROMETHAMINE 30 MG/ML
30 INJECTION, SOLUTION INTRAMUSCULAR; INTRAVENOUS ONCE
Status: COMPLETED | OUTPATIENT
Start: 2025-07-11 | End: 2025-07-11

## 2025-07-11 RX ORDER — IBUPROFEN 600 MG/1
600 TABLET, FILM COATED ORAL 3 TIMES DAILY PRN
Qty: 30 TABLET | Refills: 0 | Status: SHIPPED | OUTPATIENT
Start: 2025-07-11

## 2025-07-11 RX ADMIN — KETOROLAC TROMETHAMINE 30 MG: 30 INJECTION, SOLUTION INTRAMUSCULAR at 15:56

## 2025-07-11 ASSESSMENT — PAIN SCALES - GENERAL: PAINLEVEL_OUTOF10: 10

## 2025-07-11 ASSESSMENT — PAIN DESCRIPTION - LOCATION: LOCATION: LEG

## 2025-07-11 ASSESSMENT — PAIN DESCRIPTION - ORIENTATION: ORIENTATION: LEFT

## 2025-07-11 ASSESSMENT — PAIN - FUNCTIONAL ASSESSMENT: PAIN_FUNCTIONAL_ASSESSMENT: 0-10

## 2025-07-11 NOTE — ED TRIAGE NOTES
Pt to ed c/o left leg pain, swelling. Per pt she has a hx of dvt. Pt states she noticed the swelling and redness two days ago and it has worsened. Pt states it is painful to walk. Pt states she is supposed to be taking blood thinners, but has missed a few doses.     Pt is alert and oriented x4. Vitals stable. Call light in reach. Will continue with plan of care.

## 2025-07-11 NOTE — ED PROVIDER NOTES
10/25/22   Ganga Gee APRN - NP   nitroGLYCERIN (NITROSTAT) 0.4 MG SL tablet Place 1 tablet under the tongue every 5 minutes as needed for Chest pain up to max of 3 total doses. If no relief after 1 dose, call 911.  Patient not taking: Reported on 4/22/2025 10/24/22   Ganga Gee APRN - NP   potassium chloride (KLOR-CON M) 20 MEQ extended release tablet Take 1 tablet by mouth daily for 5 days 4/19/21 6/5/22  Chris Scott,        REVIEW OF SYSTEMS       See HPI    PHYSICAL EXAM      INITIAL VITALS:   BP (!) 148/114   Pulse 96   Temp 98.4 °F (36.9 °C)   Resp 16   SpO2 100%     Physical Exam  Constitutional:       General: She is not in acute distress.     Appearance: Normal appearance. She is normal weight. She is not ill-appearing.   HENT:      Head: Normocephalic and atraumatic.   Cardiovascular:      Rate and Rhythm: Normal rate and regular rhythm.      Pulses: Normal pulses.      Heart sounds: Normal heart sounds. No murmur heard.  Pulmonary:      Effort: Pulmonary effort is normal. No respiratory distress.      Breath sounds: Normal breath sounds. No wheezing.   Abdominal:      General: There is no distension.      Palpations: Abdomen is soft.      Tenderness: There is no abdominal tenderness.   Musculoskeletal:         General: Swelling and tenderness present.      Right lower leg: No edema.      Left lower leg: Edema present.   Neurological:      General: No focal deficit present.      Mental Status: She is alert and oriented to person, place, and time. Mental status is at baseline.      Motor: No weakness.   Psychiatric:         Mood and Affect: Mood normal.         Behavior: Behavior normal.         Thought Content: Thought content normal.         Judgment: Judgment normal.       DDX/DIAGNOSTIC RESULTS / EMERGENCY DEPARTMENT COURSE / MDM     Medical Decision Making  Amount and/or Complexity of Data Reviewed  Radiology:  Decision-making details documented in ED Course.  ECG/medicine  tests: ordered.    Risk  Prescription drug management.    44-year-old female presenting to the emergency department with complaint of left lower extremity tenderness, swelling, and edema.  Please see HPI for additional details    Patient presents to the emergency department alert and oriented x 4, no acute distress, patient is hypertensive with /114 and tachycardic with HR: 102.  Lungs are clear to auscultation bilaterally.  Regular rate and rhythm.  Patient does have tenderness, swelling, and pitting edema of her left lower extremity.  Impression is for left lower leg DVT.  Plan for lower extremity Doppler ultrasound to evaluate for presence of DVT.  Will treat patient's pain with a shot of Toradol.  Continue to monitor and reassess    Patient's left lower extremity duplex came back positive for superficial thrombophlebitis.  This will be managed outpatient.  Patient was given instructions to use warm compresses on the area tenderness.  Patient will be given a refill of her Xarelto anticoagulation.  Continue taking baby aspirin 81 mg daily.  Instructions given to follow-up with PCP this week.    EKG  Normal sinus rhythm with sinus arrhythmia.  92 bpm.  QTc 457.  Possible left atrial enlargement and left ventricular hypertrophy.    All EKG's are interpreted by the Emergency Department Physician who either signs or Co-signs this chart in the absence of a cardiologist.    EMERGENCY DEPARTMENT COURSE:    ED Course as of 07/11/25 2331   Fri Jul 11, 2025   1821 Vascular duplex lower extremity venous left  Acute occlusive superficial vein thrombosis in the left small saphenous vein. [EF]      ED Course User Index  [EF] Osmani Llamas DO     CONSULTS:  None    FINAL IMPRESSION      1. Left leg pain    2. Thrombophlebitis of superficial veins of left lower extremity          DISPOSITION / PLAN     DISPOSITION Decision To Discharge 07/11/2025 06:25:46 PM   DISPOSITION CONDITION Stable     PATIENT REFERRED TO:  [facility]

## 2025-07-11 NOTE — DISCHARGE INSTRUCTIONS
You were seen in the emergency department due to left leg swelling and pain.  Doppler ultrasound was done and was positive for superficial thrombophlebitis.  Recommendation is for you to continue taking your Xarelto and daily aspirin.  Take ibuprofen at home for pain.  Use warm compresses at home as well to help dissolve the clot.    Follow-up with your PCP this week    Return to the emergency department immediately if you have worsening leg pain, swelling, shortness of breath, chest pain, numbness, weakness, or any other concerning symptoms.

## 2025-07-12 LAB
EKG ATRIAL RATE: 92 BPM
EKG P AXIS: 72 DEGREES
EKG P-R INTERVAL: 186 MS
EKG Q-T INTERVAL: 370 MS
EKG QRS DURATION: 80 MS
EKG QTC CALCULATION (BAZETT): 457 MS
EKG R AXIS: 66 DEGREES
EKG T AXIS: 50 DEGREES
EKG VENTRICULAR RATE: 92 BPM

## 2025-07-12 PROCEDURE — 93010 ELECTROCARDIOGRAM REPORT: CPT | Performed by: INTERNAL MEDICINE

## 2025-07-12 PROCEDURE — 93971 EXTREMITY STUDY: CPT | Performed by: STUDENT IN AN ORGANIZED HEALTH CARE EDUCATION/TRAINING PROGRAM

## 2025-07-12 NOTE — ED PROVIDER NOTES
[facility]     Emergency Department     Faculty Attestation    I performed a history and physical examination of the patient and discussed management with the resident. I reviewed the resident’s note and agree with the documented findings including all diagnostic interpretations and plan of care. Any areas of disagreement are noted on the chart. I was personally present for the key portions of any procedures. I have documented in the chart those procedures where I was not present during the key portions. I have reviewed the emergency nurses triage note. I agree with the chief complaint, past medical history, past surgical history, allergies, medications, social and family history as documented unless otherwise noted below. Documentation of the HPI, Physical Exam and Medical Decision Making performed by jessenia is based on my personal performance of the HPI, PE and MDM. For Physician Assistant/ Nurse Practitioner cases/documentation I have personally evaluated this patient and have completed at least one if not all key elements of the E/M (history, physical exam, and MDM). Additional findings are as noted.    Primary Care Physician: No primary care provider on file.    Note Started: 12:46 AM EDT     VITAL SIGNS:   temperature is 98.4 °F (36.9 °C). Her blood pressure is 148/114 (abnormal) and her pulse is 96. Her respiration is 16 and oxygen saturation is 100%.      Medical Decision Making  Left leg swelling.  Known history of DVT on on anticoagulants.  Swelling noticed earlier today with increased pain.  Has been taking her anticoagulants as prescribed.  No overlying skin changes but edema is present.  Will obtain ultrasound to evaluate further    Amount and/or Complexity of Data Reviewed  Radiology:  Decision-making details documented in ED Course.  ECG/medicine tests: ordered.    Risk  Prescription drug management.      Anirudh Morataya MD, FACEP,